# Patient Record
Sex: FEMALE | Race: WHITE | NOT HISPANIC OR LATINO
[De-identification: names, ages, dates, MRNs, and addresses within clinical notes are randomized per-mention and may not be internally consistent; named-entity substitution may affect disease eponyms.]

---

## 2017-02-06 ENCOUNTER — APPOINTMENT (OUTPATIENT)
Dept: ORTHOPEDIC SURGERY | Facility: CLINIC | Age: 72
End: 2017-02-06

## 2017-02-06 DIAGNOSIS — T84.82XD FIBROSIS DUE TO INTERNAL ORTHOPEDIC PROSTHETIC DEVICES, IMPLANTS AND GRAFTS, SUBSEQUENT ENCOUNTER: ICD-10-CM

## 2017-02-06 DIAGNOSIS — M17.11 UNILATERAL PRIMARY OSTEOARTHRITIS, RIGHT KNEE: ICD-10-CM

## 2017-02-06 DIAGNOSIS — Z47.1 AFTERCARE FOLLOWING JOINT REPLACEMENT SURGERY: ICD-10-CM

## 2017-02-06 DIAGNOSIS — Z96.652 AFTERCARE FOLLOWING JOINT REPLACEMENT SURGERY: ICD-10-CM

## 2017-05-02 ENCOUNTER — OUTPATIENT (OUTPATIENT)
Dept: OUTPATIENT SERVICES | Facility: HOSPITAL | Age: 72
LOS: 1 days | End: 2017-05-02
Payer: MEDICARE

## 2017-05-02 DIAGNOSIS — Z98.89 OTHER SPECIFIED POSTPROCEDURAL STATES: Chronic | ICD-10-CM

## 2017-05-02 DIAGNOSIS — Z41.1 ENCOUNTER FOR COSMETIC SURGERY: Chronic | ICD-10-CM

## 2017-05-02 PROCEDURE — 73630 X-RAY EXAM OF FOOT: CPT

## 2017-05-02 PROCEDURE — 73630 X-RAY EXAM OF FOOT: CPT | Mod: 26,LT

## 2017-06-05 ENCOUNTER — EMERGENCY (EMERGENCY)
Facility: HOSPITAL | Age: 72
LOS: 1 days | Discharge: HOME | End: 2017-06-05
Attending: EMERGENCY MEDICINE | Admitting: EMERGENCY MEDICINE
Payer: MEDICARE

## 2017-06-05 VITALS
TEMPERATURE: 98 F | DIASTOLIC BLOOD PRESSURE: 67 MMHG | OXYGEN SATURATION: 99 % | HEART RATE: 82 BPM | RESPIRATION RATE: 16 BRPM | SYSTOLIC BLOOD PRESSURE: 122 MMHG

## 2017-06-05 DIAGNOSIS — Z41.1 ENCOUNTER FOR COSMETIC SURGERY: Chronic | ICD-10-CM

## 2017-06-05 DIAGNOSIS — Z79.899 OTHER LONG TERM (CURRENT) DRUG THERAPY: ICD-10-CM

## 2017-06-05 DIAGNOSIS — M25.572 PAIN IN LEFT ANKLE AND JOINTS OF LEFT FOOT: ICD-10-CM

## 2017-06-05 DIAGNOSIS — Z98.89 OTHER SPECIFIED POSTPROCEDURAL STATES: Chronic | ICD-10-CM

## 2017-06-05 DIAGNOSIS — S93.402A SPRAIN OF UNSPECIFIED LIGAMENT OF LEFT ANKLE, INITIAL ENCOUNTER: ICD-10-CM
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
## 2017-06-05 DIAGNOSIS — Z79.891 LONG TERM (CURRENT) USE OF OPIATE ANALGESIC: ICD-10-CM
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
## 2017-06-05 PROCEDURE — 99283 EMERGENCY DEPT VISIT LOW MDM: CPT

## 2017-06-05 PROCEDURE — 99283 EMERGENCY DEPT VISIT LOW MDM: CPT | Mod: 25

## 2017-06-05 PROCEDURE — 73610 X-RAY EXAM OF ANKLE: CPT | Mod: 26,LT

## 2017-06-05 PROCEDURE — 73610 X-RAY EXAM OF ANKLE: CPT

## 2017-06-05 NOTE — ED PROVIDER NOTE - MEDICAL DECISION MAKING DETAILS
twisted left ankle today.  only pain with weight bearing.  able to weight bear.  will get xray twisted left ankle today.  only pain with weight bearing.  able to weight bear.  will get xray.  xray no fracture

## 2017-06-16 ENCOUNTER — OUTPATIENT (OUTPATIENT)
Dept: OUTPATIENT SERVICES | Facility: HOSPITAL | Age: 72
LOS: 1 days | Discharge: HOME | End: 2017-06-16

## 2017-06-16 DIAGNOSIS — Z98.89 OTHER SPECIFIED POSTPROCEDURAL STATES: Chronic | ICD-10-CM

## 2017-06-16 DIAGNOSIS — E66.8 OTHER OBESITY: ICD-10-CM

## 2017-06-16 DIAGNOSIS — K21.9 GASTRO-ESOPHAGEAL REFLUX DISEASE WITHOUT ESOPHAGITIS: ICD-10-CM

## 2017-06-16 DIAGNOSIS — Z41.1 ENCOUNTER FOR COSMETIC SURGERY: Chronic | ICD-10-CM

## 2017-06-16 DIAGNOSIS — M24.575 CONTRACTURE, LEFT FOOT: ICD-10-CM

## 2017-06-16 DIAGNOSIS — E11.9 TYPE 2 DIABETES MELLITUS WITHOUT COMPLICATIONS: ICD-10-CM

## 2017-06-16 DIAGNOSIS — M20.12 HALLUX VALGUS (ACQUIRED), LEFT FOOT: ICD-10-CM

## 2017-06-16 DIAGNOSIS — Z47.2 ENCOUNTER FOR REMOVAL OF INTERNAL FIXATION DEVICE: ICD-10-CM

## 2017-07-17 ENCOUNTER — OUTPATIENT (OUTPATIENT)
Dept: OUTPATIENT SERVICES | Facility: HOSPITAL | Age: 72
LOS: 1 days | End: 2017-07-17
Payer: MEDICARE

## 2017-07-17 DIAGNOSIS — Z98.89 OTHER SPECIFIED POSTPROCEDURAL STATES: Chronic | ICD-10-CM

## 2017-07-17 DIAGNOSIS — Z41.1 ENCOUNTER FOR COSMETIC SURGERY: Chronic | ICD-10-CM

## 2017-07-17 PROCEDURE — 73630 X-RAY EXAM OF FOOT: CPT | Mod: 26,LT

## 2017-07-17 PROCEDURE — 73630 X-RAY EXAM OF FOOT: CPT

## 2017-08-02 ENCOUNTER — OUTPATIENT (OUTPATIENT)
Dept: OUTPATIENT SERVICES | Facility: HOSPITAL | Age: 72
LOS: 1 days | End: 2017-08-02
Payer: MEDICARE

## 2017-08-02 DIAGNOSIS — Z98.89 OTHER SPECIFIED POSTPROCEDURAL STATES: Chronic | ICD-10-CM

## 2017-08-02 DIAGNOSIS — Z41.1 ENCOUNTER FOR COSMETIC SURGERY: Chronic | ICD-10-CM

## 2017-08-02 PROCEDURE — 73630 X-RAY EXAM OF FOOT: CPT | Mod: 26,LT

## 2017-08-02 PROCEDURE — 73630 X-RAY EXAM OF FOOT: CPT

## 2017-08-21 ENCOUNTER — OUTPATIENT (OUTPATIENT)
Dept: OUTPATIENT SERVICES | Facility: HOSPITAL | Age: 72
LOS: 1 days | End: 2017-08-21
Payer: MEDICARE

## 2017-08-21 DIAGNOSIS — Z98.89 OTHER SPECIFIED POSTPROCEDURAL STATES: Chronic | ICD-10-CM

## 2017-08-21 DIAGNOSIS — Z41.1 ENCOUNTER FOR COSMETIC SURGERY: Chronic | ICD-10-CM

## 2017-08-21 PROCEDURE — 73630 X-RAY EXAM OF FOOT: CPT

## 2017-08-21 PROCEDURE — 73630 X-RAY EXAM OF FOOT: CPT | Mod: 26,LT

## 2017-10-19 ENCOUNTER — APPOINTMENT (OUTPATIENT)
Dept: HEART AND VASCULAR | Facility: CLINIC | Age: 72
End: 2017-10-19
Payer: MEDICARE

## 2017-10-19 VITALS
OXYGEN SATURATION: 96 % | BODY MASS INDEX: 31.22 KG/M2 | HEIGHT: 60 IN | WEIGHT: 159 LBS | SYSTOLIC BLOOD PRESSURE: 108 MMHG | HEART RATE: 81 BPM | DIASTOLIC BLOOD PRESSURE: 72 MMHG | TEMPERATURE: 98.2 F

## 2017-10-19 PROCEDURE — 99214 OFFICE O/P EST MOD 30 MIN: CPT | Mod: 25

## 2017-11-02 ENCOUNTER — APPOINTMENT (OUTPATIENT)
Dept: HEART AND VASCULAR | Facility: CLINIC | Age: 72
End: 2017-11-02

## 2017-11-04 ENCOUNTER — OUTPATIENT (OUTPATIENT)
Dept: OUTPATIENT SERVICES | Facility: HOSPITAL | Age: 72
LOS: 1 days | End: 2017-11-04
Payer: MEDICARE

## 2017-11-04 DIAGNOSIS — Z98.89 OTHER SPECIFIED POSTPROCEDURAL STATES: Chronic | ICD-10-CM

## 2017-11-04 DIAGNOSIS — Z41.1 ENCOUNTER FOR COSMETIC SURGERY: Chronic | ICD-10-CM

## 2017-11-04 PROCEDURE — 72148 MRI LUMBAR SPINE W/O DYE: CPT

## 2017-11-04 PROCEDURE — 72148 MRI LUMBAR SPINE W/O DYE: CPT | Mod: 26

## 2018-04-16 ENCOUNTER — OUTPATIENT (OUTPATIENT)
Dept: OUTPATIENT SERVICES | Facility: HOSPITAL | Age: 73
LOS: 1 days | End: 2018-04-16
Payer: MEDICARE

## 2018-04-16 DIAGNOSIS — Z98.89 OTHER SPECIFIED POSTPROCEDURAL STATES: Chronic | ICD-10-CM

## 2018-04-16 DIAGNOSIS — Z41.1 ENCOUNTER FOR COSMETIC SURGERY: Chronic | ICD-10-CM

## 2018-04-16 PROCEDURE — 73630 X-RAY EXAM OF FOOT: CPT

## 2018-04-16 PROCEDURE — 73630 X-RAY EXAM OF FOOT: CPT | Mod: 26,LT

## 2018-04-23 ENCOUNTER — APPOINTMENT (OUTPATIENT)
Dept: CT IMAGING | Facility: HOSPITAL | Age: 73
End: 2018-04-23
Payer: MEDICARE

## 2018-04-23 ENCOUNTER — OUTPATIENT (OUTPATIENT)
Dept: OUTPATIENT SERVICES | Facility: HOSPITAL | Age: 73
LOS: 1 days | End: 2018-04-23
Payer: MEDICARE

## 2018-04-23 DIAGNOSIS — Z98.89 OTHER SPECIFIED POSTPROCEDURAL STATES: Chronic | ICD-10-CM

## 2018-04-23 DIAGNOSIS — Z41.1 ENCOUNTER FOR COSMETIC SURGERY: Chronic | ICD-10-CM

## 2018-04-23 PROCEDURE — 73700 CT LOWER EXTREMITY W/O DYE: CPT

## 2018-04-23 PROCEDURE — 73700 CT LOWER EXTREMITY W/O DYE: CPT | Mod: 26,LT

## 2018-11-16 ENCOUNTER — OUTPATIENT (OUTPATIENT)
Dept: OUTPATIENT SERVICES | Facility: HOSPITAL | Age: 73
LOS: 1 days | Discharge: HOME | End: 2018-11-16

## 2018-11-16 DIAGNOSIS — Z98.89 OTHER SPECIFIED POSTPROCEDURAL STATES: Chronic | ICD-10-CM

## 2018-11-16 DIAGNOSIS — Z41.1 ENCOUNTER FOR COSMETIC SURGERY: Chronic | ICD-10-CM

## 2019-01-15 ENCOUNTER — OUTPATIENT (OUTPATIENT)
Dept: OUTPATIENT SERVICES | Facility: HOSPITAL | Age: 74
LOS: 1 days | End: 2019-01-15
Payer: MEDICARE

## 2019-01-15 DIAGNOSIS — Z41.1 ENCOUNTER FOR COSMETIC SURGERY: Chronic | ICD-10-CM

## 2019-01-15 DIAGNOSIS — Z98.89 OTHER SPECIFIED POSTPROCEDURAL STATES: Chronic | ICD-10-CM

## 2019-01-15 PROCEDURE — 73630 X-RAY EXAM OF FOOT: CPT

## 2019-01-15 PROCEDURE — 73630 X-RAY EXAM OF FOOT: CPT | Mod: 26,LT

## 2019-02-26 ENCOUNTER — OUTPATIENT (OUTPATIENT)
Dept: OUTPATIENT SERVICES | Facility: HOSPITAL | Age: 74
LOS: 1 days | End: 2019-02-26
Payer: MEDICARE

## 2019-02-26 DIAGNOSIS — Z98.89 OTHER SPECIFIED POSTPROCEDURAL STATES: Chronic | ICD-10-CM

## 2019-02-26 DIAGNOSIS — Z41.1 ENCOUNTER FOR COSMETIC SURGERY: Chronic | ICD-10-CM

## 2019-02-26 PROCEDURE — 73630 X-RAY EXAM OF FOOT: CPT | Mod: 26,LT

## 2019-02-26 PROCEDURE — 73630 X-RAY EXAM OF FOOT: CPT

## 2019-03-27 ENCOUNTER — APPOINTMENT (OUTPATIENT)
Dept: VASCULAR SURGERY | Facility: CLINIC | Age: 74
End: 2019-03-27
Payer: MEDICARE

## 2019-03-27 VITALS — SYSTOLIC BLOOD PRESSURE: 144 MMHG | DIASTOLIC BLOOD PRESSURE: 79 MMHG | OXYGEN SATURATION: 88 % | HEART RATE: 91 BPM

## 2019-03-27 PROCEDURE — 99214 OFFICE O/P EST MOD 30 MIN: CPT

## 2019-03-31 NOTE — HISTORY OF PRESENT ILLNESS
[FreeTextEntry1] : 74 yo F who presents to the office for follow-up of spider veins. Patient was last seen here in 2016 for this. She reports that over the last two years her edema and spider veins have worsened. States her legs are very heavy and painful and the veins become large and tender to touch. Patient has underwent multiple foot and knee surgeries, but is walking without a cane. 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 No known personal or family hx of DVT or heme disorder. \par

## 2019-03-31 NOTE — ASSESSMENT
[FreeTextEntry1] : 74 yo F here for follow-up evaluation of spider veins. Pt states her legs are painful, but is mostly bothered by their appearance.  Previous treatments with Dr Newman.  Discussion with pt about her surgical options.  Veins are too small in order to perform stab phlebectomy.  Despite having symptoms, explained that sclerotherapy is considered cosmetic and not covered by insurance.  Will need several sessions if she decides to proceed. Will call the office and schedule if desired.

## 2019-03-31 NOTE — END OF VISIT
[FreeTextEntry3] : All medical record entries made by the Scribe were at my, Dr. Napier's direction and personally dictated by me on 03/27/2019 I have reviewed the chart and agree that the record accurately reflects my personal performance of the history, physical exam, assessment and plan. I have also personally directed, reviewed, and agreed with the chart.

## 2019-03-31 NOTE — PHYSICAL EXAM
[Respiratory Effort] : normal respiratory effort [Normal Heart Sounds] : normal heart sounds [2+] : left 2+ [Ankle Swelling (On Exam)] : present [Ankle Swelling Bilaterally] : bilaterally  [No Rash or Lesion] : No rash or lesion [Alert] : alert [Calm] : calm [Ankle Swelling On The Left] : moderate [JVD] : no jugular venous distention  [Varicose Veins Of Lower Extremities] : not present [de-identified] : Well appearing, NAD [de-identified] : NCAT [FreeTextEntry1] : many large, dark areas of spider veins throughout both legs [de-identified] : FROM

## 2019-06-10 ENCOUNTER — OUTPATIENT (OUTPATIENT)
Dept: OUTPATIENT SERVICES | Facility: HOSPITAL | Age: 74
LOS: 1 days | End: 2019-06-10
Payer: MEDICARE

## 2019-06-10 DIAGNOSIS — Z98.89 OTHER SPECIFIED POSTPROCEDURAL STATES: Chronic | ICD-10-CM

## 2019-06-10 DIAGNOSIS — Z41.1 ENCOUNTER FOR COSMETIC SURGERY: Chronic | ICD-10-CM

## 2019-06-10 PROCEDURE — 73630 X-RAY EXAM OF FOOT: CPT | Mod: 26,50

## 2019-06-12 PROCEDURE — 97161 PT EVAL LOW COMPLEX 20 MIN: CPT

## 2019-06-12 PROCEDURE — 73630 X-RAY EXAM OF FOOT: CPT

## 2019-09-10 ENCOUNTER — OUTPATIENT (OUTPATIENT)
Dept: OUTPATIENT SERVICES | Facility: HOSPITAL | Age: 74
LOS: 1 days | End: 2019-09-10
Payer: MEDICARE

## 2019-09-10 DIAGNOSIS — Z98.89 OTHER SPECIFIED POSTPROCEDURAL STATES: Chronic | ICD-10-CM

## 2019-09-10 DIAGNOSIS — Z41.1 ENCOUNTER FOR COSMETIC SURGERY: Chronic | ICD-10-CM

## 2019-09-10 PROCEDURE — 73630 X-RAY EXAM OF FOOT: CPT | Mod: 26,LT

## 2019-09-10 PROCEDURE — 73630 X-RAY EXAM OF FOOT: CPT

## 2019-12-03 ENCOUNTER — RESULT REVIEW (OUTPATIENT)
Age: 74
End: 2019-12-03

## 2020-09-05 ENCOUNTER — APPOINTMENT (OUTPATIENT)
Dept: MRI IMAGING | Facility: HOSPITAL | Age: 75
End: 2020-09-05

## 2020-09-05 ENCOUNTER — OUTPATIENT (OUTPATIENT)
Dept: OUTPATIENT SERVICES | Facility: HOSPITAL | Age: 75
LOS: 1 days | End: 2020-09-05
Payer: MEDICARE

## 2020-09-05 DIAGNOSIS — Z41.1 ENCOUNTER FOR COSMETIC SURGERY: Chronic | ICD-10-CM

## 2020-09-05 DIAGNOSIS — Z98.89 OTHER SPECIFIED POSTPROCEDURAL STATES: Chronic | ICD-10-CM

## 2020-09-05 PROCEDURE — 72148 MRI LUMBAR SPINE W/O DYE: CPT | Mod: 26

## 2020-09-05 PROCEDURE — 72148 MRI LUMBAR SPINE W/O DYE: CPT

## 2020-12-07 ENCOUNTER — OUTPATIENT (OUTPATIENT)
Dept: OUTPATIENT SERVICES | Facility: HOSPITAL | Age: 75
LOS: 1 days | End: 2020-12-07
Payer: MEDICARE

## 2020-12-07 DIAGNOSIS — Z41.1 ENCOUNTER FOR COSMETIC SURGERY: Chronic | ICD-10-CM

## 2020-12-07 DIAGNOSIS — Z98.89 OTHER SPECIFIED POSTPROCEDURAL STATES: Chronic | ICD-10-CM

## 2020-12-07 PROCEDURE — 73502 X-RAY EXAM HIP UNI 2-3 VIEWS: CPT | Mod: 26,LT

## 2020-12-07 PROCEDURE — 73564 X-RAY EXAM KNEE 4 OR MORE: CPT

## 2020-12-07 PROCEDURE — 73030 X-RAY EXAM OF SHOULDER: CPT

## 2020-12-07 PROCEDURE — 73030 X-RAY EXAM OF SHOULDER: CPT | Mod: 26,LT

## 2020-12-07 PROCEDURE — 73564 X-RAY EXAM KNEE 4 OR MORE: CPT | Mod: 26,RT

## 2020-12-07 PROCEDURE — 73502 X-RAY EXAM HIP UNI 2-3 VIEWS: CPT

## 2021-04-14 ENCOUNTER — EMERGENCY (EMERGENCY)
Facility: HOSPITAL | Age: 76
LOS: 1 days | Discharge: HOME | End: 2021-04-14
Admitting: EMERGENCY MEDICINE
Payer: MEDICARE

## 2021-04-14 ENCOUNTER — APPOINTMENT (OUTPATIENT)
Dept: VASCULAR SURGERY | Facility: CLINIC | Age: 76
End: 2021-04-14
Payer: MEDICARE

## 2021-04-14 VITALS
RESPIRATION RATE: 20 BRPM | HEART RATE: 67 BPM | WEIGHT: 171.96 LBS | SYSTOLIC BLOOD PRESSURE: 137 MMHG | OXYGEN SATURATION: 98 % | TEMPERATURE: 98 F | DIASTOLIC BLOOD PRESSURE: 77 MMHG | HEIGHT: 62 IN

## 2021-04-14 DIAGNOSIS — Z98.89 OTHER SPECIFIED POSTPROCEDURAL STATES: Chronic | ICD-10-CM
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
## 2021-04-14 DIAGNOSIS — S50.811A ABRASION OF RIGHT FOREARM, INITIAL ENCOUNTER: ICD-10-CM

## 2021-04-14 DIAGNOSIS — S51.011A LACERATION WITHOUT FOREIGN BODY OF RIGHT ELBOW, INITIAL ENCOUNTER: ICD-10-CM

## 2021-04-14 DIAGNOSIS — Z87.891 PERSONAL HISTORY OF NICOTINE DEPENDENCE: ICD-10-CM

## 2021-04-14 DIAGNOSIS — I83.93 ASYMPTOMATIC VARICOSE VEINS OF BILATERAL LOWER EXTREMITIES: ICD-10-CM

## 2021-04-14 DIAGNOSIS — Z41.1 ENCOUNTER FOR COSMETIC SURGERY: Chronic | ICD-10-CM

## 2021-04-14 PROCEDURE — 12002 RPR S/N/AX/GEN/TRNK2.6-7.5CM: CPT

## 2021-04-14 PROCEDURE — 99282 EMERGENCY DEPT VISIT SF MDM: CPT | Mod: 25

## 2021-04-14 PROCEDURE — 99213 OFFICE O/P EST LOW 20 MIN: CPT

## 2021-04-14 PROCEDURE — 93970 EXTREMITY STUDY: CPT

## 2021-04-14 PROCEDURE — 99283 EMERGENCY DEPT VISIT LOW MDM: CPT | Mod: 25

## 2021-04-14 NOTE — ED PROVIDER NOTE - OBJECTIVE STATEMENT
74 y/o female with updated tdap is here in the ED c/o scratch located on the right elbow. Pt states she was cleaning her mirror when she the edge scratched her arm. She applied a band aid which controlled the bleeding. She denies the following: numbness/tingling, arm pain.

## 2021-04-14 NOTE — ED PROVIDER NOTE - PATIENT PORTAL LINK FT
You can access the FollowMyHealth Patient Portal offered by [facility] by registering at the following website: [email or web address]. By joining [employer]’s FollowMyHealth portal, you will also be able to view your health information using other applications (apps) compatible with our system.

## 2021-04-14 NOTE — ED ADULT NURSE NOTE - OBJECTIVE STATEMENT
Pt is a 74 y/o female A&Ox4 in NAD ambulatory with steady gait c/o laceration to right elbow. Bleeding controlled with dressing. Pt denies fall.

## 2021-04-14 NOTE — ED PROVIDER NOTE - CLINICAL SUMMARY MEDICAL DECISION MAKING FREE TEXT BOX
76 y/o female here in the ED with very superficial lac/abrasion located on the right proximal forearm with hemostasis achieved prior to ED arrival. Wound care and dermabond placed. Discussed wound care and follow-up with PMD I have discussed the discharge plan with the patient. The patient agrees with the plan, as discussed.  The patient understands Emergency Department diagnosis is a preliminary diagnosis often based on limited information and that the patient must adhere to the follow-up plan as discussed.  The patient understands that if the symptoms worsen or if prescribed medications do not have the desired/planned effect that the patient may return to the Emergency Department at any time for further evaluation and treatment.

## 2021-05-07 NOTE — ADDENDUM
[FreeTextEntry1] : This note was written by Nia Forrest on 04/14/2021 acting as scribe for Quyen Pisano M.D.\par \par I, Dr. Holger Napier  have read and attest that all the information, medical decision making and discharge instructions within are true and accurate.

## 2021-05-07 NOTE — ASSESSMENT
[FreeTextEntry1] : 76 y/o F here for follow-up evaluation of BLE pain and swelling. On exam, many large, dark areas of spider veins throughout both legs. Feet are pink, toes are warm to touch with adequate capillary refill. \par \par Plan: \par Discussed findings and treatment options. Small varicosities not amendable for intervention. Sclerotherapy discussed are cosmetic and therefore insurance does not cover this. No evidence of DVT, skin intact well perfused. Weight loss measures provided as this will help reduce swelling. Pt recommended she wear compression stockings daily, raise legs daily as much as possible and stay active with daily walking. She may f/u here as necessary.

## 2021-05-07 NOTE — PHYSICAL EXAM
[Respiratory Effort] : normal respiratory effort [Normal Heart Sounds] : normal heart sounds [2+] : left 2+ [Ankle Swelling (On Exam)] : present [Ankle Swelling Bilaterally] : bilaterally  [Ankle Swelling On The Left] : moderate [No Rash or Lesion] : No rash or lesion [Alert] : alert [Oriented to Person] : oriented to person [Oriented to Place] : oriented to place [Oriented to Time] : oriented to time [Calm] : calm [JVD] : no jugular venous distention  [Varicose Veins Of Lower Extremities] : not present [de-identified] : Well appearing, NAD [de-identified] : NCAT [de-identified] : Supple  [FreeTextEntry1] : many large, dark areas of spider veins throughout both legs [de-identified] : FROM

## 2021-05-07 NOTE — HISTORY OF PRESENT ILLNESS
[FreeTextEntry1] : 76 y/o F who presents to the office for follow-up of spider veins. Patient was last seen here in 2019 for this. She reports that over the last two years her edema and spider veins have worsened. States her legs are very heavy and painful and the veins have become larger and tender to touch. Patient has underwent multiple foot and knee surgeries, but is walking without a cane. Furthermore, pt reports 2 weeks ago, she developed severe LE swelling which has now improved. Pt reports during the past year she has gained ~ 40lbs. Denies any leg trauma or ulcerations. 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 No known personal or family hx of DVT or heme disorder. \par

## 2021-05-11 ENCOUNTER — APPOINTMENT (OUTPATIENT)
Dept: HEART AND VASCULAR | Facility: CLINIC | Age: 76
End: 2021-05-11
Payer: MEDICARE

## 2021-05-11 ENCOUNTER — NON-APPOINTMENT (OUTPATIENT)
Age: 76
End: 2021-05-11

## 2021-05-11 VITALS
OXYGEN SATURATION: 94 % | BODY MASS INDEX: 33.38 KG/M2 | HEIGHT: 60 IN | HEART RATE: 86 BPM | SYSTOLIC BLOOD PRESSURE: 130 MMHG | WEIGHT: 170 LBS | TEMPERATURE: 97.7 F | DIASTOLIC BLOOD PRESSURE: 96 MMHG

## 2021-05-11 DIAGNOSIS — I83.90 ASYMPTOMATIC VARICOSE VEINS OF UNSPECIFIED LOWER EXTREMITY: ICD-10-CM

## 2021-05-11 PROCEDURE — 93000 ELECTROCARDIOGRAM COMPLETE: CPT

## 2021-05-11 PROCEDURE — 99204 OFFICE O/P NEW MOD 45 MIN: CPT

## 2021-05-11 RX ORDER — ATORVASTATIN CALCIUM 10 MG/1
10 TABLET, FILM COATED ORAL
Refills: 0 | Status: ACTIVE | COMMUNITY

## 2021-05-27 ENCOUNTER — APPOINTMENT (OUTPATIENT)
Dept: HEART AND VASCULAR | Facility: CLINIC | Age: 76
End: 2021-05-27
Payer: MEDICARE

## 2021-05-27 PROCEDURE — 93306 TTE W/DOPPLER COMPLETE: CPT

## 2021-07-23 NOTE — ASSESSMENT
[FreeTextEntry1] : LE Edema-  Assuming renal, hepatic and thyroid fx is normal, pt will need an echo, scheduled.  No Murmur on exam. \par \par HLD- continue statin.

## 2021-07-23 NOTE — PHYSICAL EXAM
[Obese] : obese [No Cyanosis] : no cyanosis [No Clubbing] : no clubbing [Normal DP B/L] : normal DP B/L [Edema ___] : edema [unfilled] [Venous varicosities] : venous varicosities [Normal] : alert and oriented, normal memory [Normal S1, S2] : normal S1, S2 [de-identified] : clouded cornea on right, lense on left [de-identified] : No JVD or bruits  [de-identified] : 1/6 SPENCER [de-identified] : loves to talk

## 2021-07-23 NOTE — HISTORY OF PRESENT ILLNESS
[FreeTextEntry1] : Ortho- Dr Boswell\par Ortho- Dr Garcia\par Vasc- Dr Napier\par PCP- Dr SENDY Mckeon  [phone]

## 2021-07-23 NOTE — REVIEW OF SYSTEMS
[Lower Ext Edema] : lower extremity edema [Tingling (Paresthesia)] : tingling [Negative] : Heme/Lymph [Joint Pain] : joint pain [de-identified] : right wrist

## 2021-07-23 NOTE — REASON FOR VISIT
[FreeTextEntry1] : 76 y/o F with recent LE edema.  Dr Weiss recommended she get an echo.  Has previously seen Dr Napier for her varicose veins. She does not elevated or use compression stockings.\par \par \par \par EKG: NSR, normal axis and intervals, no ST-Tw abnormalities. 5/11/21

## 2021-09-01 ENCOUNTER — APPOINTMENT (OUTPATIENT)
Dept: HEART AND VASCULAR | Facility: CLINIC | Age: 76
End: 2021-09-01
Payer: MEDICARE

## 2021-09-01 VITALS
DIASTOLIC BLOOD PRESSURE: 93 MMHG | WEIGHT: 168.98 LBS | SYSTOLIC BLOOD PRESSURE: 130 MMHG | TEMPERATURE: 97.5 F | HEIGHT: 60 IN | HEART RATE: 92 BPM | OXYGEN SATURATION: 97 % | BODY MASS INDEX: 33.18 KG/M2

## 2021-09-01 DIAGNOSIS — R60.0 LOCALIZED EDEMA: ICD-10-CM

## 2021-09-01 DIAGNOSIS — R01.1 CARDIAC MURMUR, UNSPECIFIED: ICD-10-CM

## 2021-09-01 PROCEDURE — 99213 OFFICE O/P EST LOW 20 MIN: CPT

## 2021-09-01 NOTE — ASSESSMENT
[FreeTextEntry1] : LE Edema-  Assuming renal, hepatic and thyroid fx is normal, pt will need an echo, scheduled.  No Murmur on exam..  Only mild TR and MR.\par \par HLD- continue statin.

## 2021-09-01 NOTE — REVIEW OF SYSTEMS
[Lower Ext Edema] : lower extremity edema [Joint Pain] : joint pain [Tingling (Paresthesia)] : tingling [Negative] : Heme/Lymph [FreeTextEntry5] : varicose veins, some are superficial [de-identified] : right wrist [de-identified] : Loquacious

## 2021-09-01 NOTE — HISTORY OF PRESENT ILLNESS
[FreeTextEntry1] : Ortho- Dr Boswell\par Ortho- Dr Garcia\par Vasc- Dr aNpier\par PCP- Dr SENDY Mckeon  [phone]\par Ophtho- Dr Garcia\par GYN- Dr Armenta, Dr Dixon

## 2021-09-14 ENCOUNTER — OUTPATIENT (OUTPATIENT)
Dept: OUTPATIENT SERVICES | Facility: HOSPITAL | Age: 76
LOS: 1 days | End: 2021-09-14
Payer: MEDICARE

## 2021-09-14 DIAGNOSIS — Z98.89 OTHER SPECIFIED POSTPROCEDURAL STATES: Chronic | ICD-10-CM

## 2021-09-14 DIAGNOSIS — Z41.1 ENCOUNTER FOR COSMETIC SURGERY: Chronic | ICD-10-CM

## 2021-09-14 PROCEDURE — 73630 X-RAY EXAM OF FOOT: CPT | Mod: 26,LT

## 2021-09-14 PROCEDURE — 73630 X-RAY EXAM OF FOOT: CPT

## 2021-11-18 ENCOUNTER — TRANSCRIPTION ENCOUNTER (OUTPATIENT)
Age: 76
End: 2021-11-18

## 2021-11-18 VITALS
TEMPERATURE: 97 F | OXYGEN SATURATION: 96 % | HEIGHT: 62 IN | RESPIRATION RATE: 87 BRPM | WEIGHT: 177.03 LBS | DIASTOLIC BLOOD PRESSURE: 78 MMHG | SYSTOLIC BLOOD PRESSURE: 131 MMHG

## 2021-11-19 ENCOUNTER — INPATIENT (INPATIENT)
Facility: HOSPITAL | Age: 76
LOS: 8 days | Discharge: HOME HEALTH | DRG: 497 | End: 2021-11-28
Attending: ORTHOPAEDIC SURGERY | Admitting: ORTHOPAEDIC SURGERY
Payer: MEDICARE

## 2021-11-19 DIAGNOSIS — Z98.89 OTHER SPECIFIED POSTPROCEDURAL STATES: Chronic | ICD-10-CM

## 2021-11-19 DIAGNOSIS — R01.1 CARDIAC MURMUR, UNSPECIFIED: ICD-10-CM

## 2021-11-19 DIAGNOSIS — Z98.890 OTHER SPECIFIED POSTPROCEDURAL STATES: Chronic | ICD-10-CM

## 2021-11-19 DIAGNOSIS — E78.5 HYPERLIPIDEMIA, UNSPECIFIED: ICD-10-CM

## 2021-11-19 DIAGNOSIS — Z41.1 ENCOUNTER FOR COSMETIC SURGERY: Chronic | ICD-10-CM

## 2021-11-19 DIAGNOSIS — M20.12 HALLUX VALGUS (ACQUIRED), LEFT FOOT: ICD-10-CM

## 2021-11-19 DIAGNOSIS — I77.89 OTHER SPECIFIED DISORDERS OF ARTERIES AND ARTERIOLES: ICD-10-CM

## 2021-11-19 DIAGNOSIS — Z96.659 PRESENCE OF UNSPECIFIED ARTIFICIAL KNEE JOINT: Chronic | ICD-10-CM

## 2021-11-19 RX ORDER — METOCLOPRAMIDE HCL 10 MG
10 TABLET ORAL EVERY 6 HOURS
Refills: 0 | Status: DISCONTINUED | OUTPATIENT
Start: 2021-11-19 | End: 2021-11-28

## 2021-11-19 RX ORDER — OXYCODONE HYDROCHLORIDE 5 MG/1
10 TABLET ORAL EVERY 4 HOURS
Refills: 0 | Status: DISCONTINUED | OUTPATIENT
Start: 2021-11-19 | End: 2021-11-21

## 2021-11-19 RX ORDER — BENZOCAINE AND MENTHOL 5; 1 G/100ML; G/100ML
1 LIQUID ORAL ONCE
Refills: 0 | Status: COMPLETED | OUTPATIENT
Start: 2021-11-19 | End: 2021-11-19

## 2021-11-19 RX ORDER — SPIRONOLACTONE 25 MG/1
25 TABLET, FILM COATED ORAL
Refills: 0 | Status: DISCONTINUED | OUTPATIENT
Start: 2021-11-19 | End: 2021-11-25

## 2021-11-19 RX ORDER — HYDROMORPHONE HYDROCHLORIDE 2 MG/ML
0.5 INJECTION INTRAMUSCULAR; INTRAVENOUS; SUBCUTANEOUS EVERY 4 HOURS
Refills: 0 | Status: DISCONTINUED | OUTPATIENT
Start: 2021-11-19 | End: 2021-11-19

## 2021-11-19 RX ORDER — ATORVASTATIN CALCIUM 80 MG/1
10 TABLET, FILM COATED ORAL AT BEDTIME
Refills: 0 | Status: DISCONTINUED | OUTPATIENT
Start: 2021-11-19 | End: 2021-11-28

## 2021-11-19 RX ORDER — CEFAZOLIN SODIUM 1 G
2000 VIAL (EA) INJECTION EVERY 8 HOURS
Refills: 0 | Status: COMPLETED | OUTPATIENT
Start: 2021-11-19 | End: 2021-11-19

## 2021-11-19 RX ORDER — ASPIRIN/CALCIUM CARB/MAGNESIUM 324 MG
81 TABLET ORAL DAILY
Refills: 0 | Status: DISCONTINUED | OUTPATIENT
Start: 2021-11-19 | End: 2021-11-28

## 2021-11-19 RX ORDER — OXYCODONE HYDROCHLORIDE 5 MG/1
10 TABLET ORAL EVERY 4 HOURS
Refills: 0 | Status: DISCONTINUED | OUTPATIENT
Start: 2021-11-19 | End: 2021-11-19

## 2021-11-19 RX ORDER — VALACYCLOVIR 500 MG/1
1 TABLET, FILM COATED ORAL
Qty: 0 | Refills: 0 | DISCHARGE

## 2021-11-19 RX ORDER — BENZOCAINE AND MENTHOL 5; 1 G/100ML; G/100ML
1 LIQUID ORAL DAILY
Refills: 0 | Status: DISCONTINUED | OUTPATIENT
Start: 2021-11-19 | End: 2021-11-19

## 2021-11-19 RX ORDER — OXYCODONE AND ACETAMINOPHEN 5; 325 MG/1; MG/1
2 TABLET ORAL EVERY 4 HOURS
Refills: 0 | Status: DISCONTINUED | OUTPATIENT
Start: 2021-11-19 | End: 2021-11-19

## 2021-11-19 RX ORDER — HYDROMORPHONE HYDROCHLORIDE 2 MG/ML
0.5 INJECTION INTRAMUSCULAR; INTRAVENOUS; SUBCUTANEOUS
Refills: 0 | Status: DISCONTINUED | OUTPATIENT
Start: 2021-11-19 | End: 2021-11-19

## 2021-11-19 RX ORDER — ACETAMINOPHEN 500 MG
650 TABLET ORAL EVERY 6 HOURS
Refills: 0 | Status: DISCONTINUED | OUTPATIENT
Start: 2021-11-19 | End: 2021-11-28

## 2021-11-19 RX ORDER — SODIUM CHLORIDE 9 MG/ML
1000 INJECTION, SOLUTION INTRAVENOUS
Refills: 0 | Status: DISCONTINUED | OUTPATIENT
Start: 2021-11-19 | End: 2021-11-28

## 2021-11-19 RX ADMIN — BENZOCAINE AND MENTHOL 1 LOZENGE: 5; 1 LIQUID ORAL at 17:08

## 2021-11-19 RX ADMIN — Medication 100 MILLIGRAM(S): at 23:09

## 2021-11-19 RX ADMIN — ATORVASTATIN CALCIUM 10 MILLIGRAM(S): 80 TABLET, FILM COATED ORAL at 23:06

## 2021-11-19 RX ADMIN — Medication 100 MILLIGRAM(S): at 17:09

## 2021-11-19 NOTE — H&P ADULT - NSICDXPASTSURGICALHX_GEN_ALL_CORE_FT
PAST SURGICAL HISTORY:  H/O arthroscopy of left knee     H/O foot surgery     H/O rhinoplasty     H/O total knee replacement     S/P bunionectomy

## 2021-11-19 NOTE — H&P ADULT - PROBLEM SELECTOR PLAN 1
Admit to Orthopedic Service for elective left revision forefoot reconstruction w hallux fusion,2,3,4 osteotomies  Medically cleared and optimized for surgery by Dr. Mckeon Admit to Orthopedic Service for elective left torre simin procedure, hallux MTP fusion, MADONNA  Medically cleared and optimized for surgery by Dr. Mckeon

## 2021-11-19 NOTE — H&P ADULT - HISTORY OF PRESENT ILLNESS
77 yo female with left foot pain. Patient has pain and difficulty with ambulation. Previous history of left foot surgery without complications.

## 2021-11-19 NOTE — H&P ADULT - NSHPLABSRESULTS_GEN_ALL_CORE
Preop CBC, BMP, PT/INR, PTT within normal range  Cr- .7  COVID PCR: neg, 11/16  3M: DOS  Preop CXR within normal limits, reviewed per medical clearance   Preop EKG WNL and reviewed per medical clearance

## 2021-11-20 ENCOUNTER — TRANSCRIPTION ENCOUNTER (OUTPATIENT)
Age: 76
End: 2021-11-20

## 2021-11-20 LAB
ANION GAP SERPL CALC-SCNC: 11 MMOL/L — SIGNIFICANT CHANGE UP (ref 5–17)
BUN SERPL-MCNC: 19 MG/DL — SIGNIFICANT CHANGE UP (ref 7–23)
CALCIUM SERPL-MCNC: 9.5 MG/DL — SIGNIFICANT CHANGE UP (ref 8.4–10.5)
CHLORIDE SERPL-SCNC: 104 MMOL/L — SIGNIFICANT CHANGE UP (ref 96–108)
CO2 SERPL-SCNC: 22 MMOL/L — SIGNIFICANT CHANGE UP (ref 22–31)
COVID-19 NUCLEOCAPSID GAM AB INTERP: NEGATIVE — SIGNIFICANT CHANGE UP
COVID-19 NUCLEOCAPSID TOTAL GAM ANTIBODY RESULT: 0.08 INDEX — SIGNIFICANT CHANGE UP
COVID-19 SPIKE DOMAIN AB INTERP: POSITIVE
COVID-19 SPIKE DOMAIN ANTIBODY RESULT: >250 U/ML — HIGH
CREAT SERPL-MCNC: 0.78 MG/DL — SIGNIFICANT CHANGE UP (ref 0.5–1.3)
GLUCOSE SERPL-MCNC: 99 MG/DL — SIGNIFICANT CHANGE UP (ref 70–99)
HCT VFR BLD CALC: 35.4 % — SIGNIFICANT CHANGE UP (ref 34.5–45)
HGB BLD-MCNC: 11.3 G/DL — LOW (ref 11.5–15.5)
MCHC RBC-ENTMCNC: 27.7 PG — SIGNIFICANT CHANGE UP (ref 27–34)
MCHC RBC-ENTMCNC: 31.9 GM/DL — LOW (ref 32–36)
MCV RBC AUTO: 86.8 FL — SIGNIFICANT CHANGE UP (ref 80–100)
NRBC # BLD: 0 /100 WBCS — SIGNIFICANT CHANGE UP (ref 0–0)
PLATELET # BLD AUTO: 259 K/UL — SIGNIFICANT CHANGE UP (ref 150–400)
POTASSIUM SERPL-MCNC: 4 MMOL/L — SIGNIFICANT CHANGE UP (ref 3.5–5.3)
POTASSIUM SERPL-SCNC: 4 MMOL/L — SIGNIFICANT CHANGE UP (ref 3.5–5.3)
RBC # BLD: 4.08 M/UL — SIGNIFICANT CHANGE UP (ref 3.8–5.2)
RBC # FLD: 14.2 % — SIGNIFICANT CHANGE UP (ref 10.3–14.5)
SARS-COV-2 IGG+IGM SERPL QL IA: 0.08 INDEX — SIGNIFICANT CHANGE UP
SARS-COV-2 IGG+IGM SERPL QL IA: >250 U/ML — HIGH
SARS-COV-2 IGG+IGM SERPL QL IA: NEGATIVE — SIGNIFICANT CHANGE UP
SARS-COV-2 IGG+IGM SERPL QL IA: POSITIVE
SODIUM SERPL-SCNC: 137 MMOL/L — SIGNIFICANT CHANGE UP (ref 135–145)
WBC # BLD: 8.84 K/UL — SIGNIFICANT CHANGE UP (ref 3.8–10.5)
WBC # FLD AUTO: 8.84 K/UL — SIGNIFICANT CHANGE UP (ref 3.8–10.5)

## 2021-11-20 RX ORDER — BENZOCAINE AND MENTHOL 5; 1 G/100ML; G/100ML
1 LIQUID ORAL THREE TIMES A DAY
Refills: 0 | Status: DISCONTINUED | OUTPATIENT
Start: 2021-11-20 | End: 2021-11-21

## 2021-11-20 RX ORDER — ASPIRIN/CALCIUM CARB/MAGNESIUM 324 MG
1 TABLET ORAL
Qty: 0 | Refills: 0 | DISCHARGE
Start: 2021-11-20

## 2021-11-20 RX ADMIN — SPIRONOLACTONE 25 MILLIGRAM(S): 25 TABLET, FILM COATED ORAL at 06:31

## 2021-11-20 RX ADMIN — ATORVASTATIN CALCIUM 10 MILLIGRAM(S): 80 TABLET, FILM COATED ORAL at 21:57

## 2021-11-20 RX ADMIN — Medication 81 MILLIGRAM(S): at 11:41

## 2021-11-20 RX ADMIN — BENZOCAINE AND MENTHOL 1 LOZENGE: 5; 1 LIQUID ORAL at 20:53

## 2021-11-20 NOTE — DISCHARGE NOTE PROVIDER - CARE PROVIDER_API CALL
Barak Boswell)  Orthopaedic Surgery  [address] [place] [address]  Phone: [phone]  Fax: [phone]  Follow Up Time:

## 2021-11-20 NOTE — DISCHARGE NOTE PROVIDER - NSDCCPCAREPLAN_GEN_ALL_CORE_FT
PRINCIPAL DISCHARGE DIAGNOSIS  Diagnosis: Hallux valgus, left  Assessment and Plan of Treatment:

## 2021-11-20 NOTE — DISCHARGE NOTE PROVIDER - NSDCMRMEDTOKEN_GEN_ALL_CORE_FT
aspirin 81 mg oral delayed release tablet: 1 tab(s) orally once a day  Lipitor 10 mg oral tablet: 1 tab(s) orally once a day  spironolactone 25 mg oral tablet: 1 tab(s) orally 2 times a day  Valtrex 500 mg oral tablet: 1 tab(s) orally once a day, As Needed   aspirin 81 mg oral delayed release tablet: 1 tab(s) orally once a day  Lipitor 10 mg oral tablet: 1 tab(s) orally once a day  Rolling Walker: 1   spironolactone 25 mg oral tablet: 1 tab(s) orally 2 times a day  Valtrex 500 mg oral tablet: 1 tab(s) orally once a day, As Needed

## 2021-11-20 NOTE — DISCHARGE NOTE PROVIDER - NSDCFUADDINST_GEN_ALL_CORE_FT
No strenuous activity, heavy lifting, driving or returning to work until cleared by MD.  You may shower - keep splint/dressing dry at all times.  Try to have regular bowel movements, take stool softener or laxative if necessary.  May take pepcid or zantac for upset stomach.  Swelling may travel all the way down leg to foot, this is normal and will subside in a few weeks.  Follow up with Dr. Boswell to schedule an appt, if you have staples or sutures they will be removed in office.  Contact your doctor if you experience: fever greater than 101.5, chills, chest pain, difficulty breathing, redness or excessive drainage around the incision, other concerns.   No strenuous activity, heavy lifting, driving or returning to work until cleared by MD.  Non-weight bearing left lower extremity with assistive devices.  keep splint/dressing dry at all times.  Try to have regular bowel movements, take stool softener or laxative if necessary.  Swelling may travel all the way down leg to foot, this is normal and will subside in a few weeks.  Follow up with Dr. Boswell to schedule an appt, if you have staples or sutures they will be removed in office.  Contact your doctor if you experience: fever greater than 101.5, chills, chest pain, difficulty breathing, redness or excessive drainage around the incision, other concerns.   No strenuous activity, heavy lifting, driving or returning to work until cleared by MD.  Heel weight bearing left lower extremity with assistive devices.  keep splint/dressing dry at all times.  Try to have regular bowel movements, take stool softener or laxative if necessary.  Swelling may travel all the way down leg to foot, this is normal and will subside in a few weeks.  Follow up with Dr. Boswell to schedule an appt, if you have staples or sutures they will be removed in office.  Contact your doctor if you experience: fever greater than 101.5, chills, chest pain, difficulty breathing, redness or excessive drainage around the incision, other concerns.

## 2021-11-20 NOTE — DISCHARGE NOTE PROVIDER - HOSPITAL COURSE
Admitted  Surgery  Lissette-op Antibiotics  Pain control  DVT prophylaxis  OOB/Physical Therapy Admitted  Surgery- left forefoot reconstruction  Lissette-op Antibiotics  Pain control  DVT prophylaxis  OOB/Physical Therapy  NWB LLE with walker

## 2021-11-21 LAB
ANION GAP SERPL CALC-SCNC: 9 MMOL/L — SIGNIFICANT CHANGE UP (ref 5–17)
BUN SERPL-MCNC: 15 MG/DL — SIGNIFICANT CHANGE UP (ref 7–23)
CALCIUM SERPL-MCNC: 9.3 MG/DL — SIGNIFICANT CHANGE UP (ref 8.4–10.5)
CHLORIDE SERPL-SCNC: 104 MMOL/L — SIGNIFICANT CHANGE UP (ref 96–108)
CO2 SERPL-SCNC: 24 MMOL/L — SIGNIFICANT CHANGE UP (ref 22–31)
CREAT SERPL-MCNC: 0.69 MG/DL — SIGNIFICANT CHANGE UP (ref 0.5–1.3)
GLUCOSE SERPL-MCNC: 104 MG/DL — HIGH (ref 70–99)
HCT VFR BLD CALC: 34.9 % — SIGNIFICANT CHANGE UP (ref 34.5–45)
HGB BLD-MCNC: 11.5 G/DL — SIGNIFICANT CHANGE UP (ref 11.5–15.5)
MCHC RBC-ENTMCNC: 28.3 PG — SIGNIFICANT CHANGE UP (ref 27–34)
MCHC RBC-ENTMCNC: 33 GM/DL — SIGNIFICANT CHANGE UP (ref 32–36)
MCV RBC AUTO: 85.7 FL — SIGNIFICANT CHANGE UP (ref 80–100)
NRBC # BLD: 0 /100 WBCS — SIGNIFICANT CHANGE UP (ref 0–0)
PLATELET # BLD AUTO: 241 K/UL — SIGNIFICANT CHANGE UP (ref 150–400)
POTASSIUM SERPL-MCNC: 3.8 MMOL/L — SIGNIFICANT CHANGE UP (ref 3.5–5.3)
POTASSIUM SERPL-SCNC: 3.8 MMOL/L — SIGNIFICANT CHANGE UP (ref 3.5–5.3)
RBC # BLD: 4.07 M/UL — SIGNIFICANT CHANGE UP (ref 3.8–5.2)
RBC # FLD: 14.1 % — SIGNIFICANT CHANGE UP (ref 10.3–14.5)
SODIUM SERPL-SCNC: 137 MMOL/L — SIGNIFICANT CHANGE UP (ref 135–145)
WBC # BLD: 6.73 K/UL — SIGNIFICANT CHANGE UP (ref 3.8–10.5)
WBC # FLD AUTO: 6.73 K/UL — SIGNIFICANT CHANGE UP (ref 3.8–10.5)

## 2021-11-21 RX ORDER — BENZOCAINE AND MENTHOL 5; 1 G/100ML; G/100ML
1 LIQUID ORAL THREE TIMES A DAY
Refills: 0 | Status: DISCONTINUED | OUTPATIENT
Start: 2021-11-21 | End: 2021-11-28

## 2021-11-21 RX ADMIN — OXYCODONE HYDROCHLORIDE 10 MILLIGRAM(S): 5 TABLET ORAL at 17:22

## 2021-11-21 RX ADMIN — OXYCODONE HYDROCHLORIDE 10 MILLIGRAM(S): 5 TABLET ORAL at 18:22

## 2021-11-21 RX ADMIN — ATORVASTATIN CALCIUM 10 MILLIGRAM(S): 80 TABLET, FILM COATED ORAL at 21:53

## 2021-11-21 RX ADMIN — BENZOCAINE AND MENTHOL 1 LOZENGE: 5; 1 LIQUID ORAL at 00:54

## 2021-11-21 RX ADMIN — Medication 81 MILLIGRAM(S): at 11:12

## 2021-11-21 NOTE — PHYSICAL THERAPY INITIAL EVALUATION ADULT - ADDITIONAL COMMENTS
Patient lives with spouse in elevator accessible apartment. Does not use any Assistive Devices at baseline. Patient's spouse won't be able assist with ADLs. Denies recent Hx of falls.

## 2021-11-21 NOTE — PHYSICAL THERAPY INITIAL EVALUATION ADULT - MANUAL MUSCLE TESTING RESULTS, REHAB EVAL
B UE and B LE >3+/5 upon functional assessment against gravity except for ankle/grossly assessed due to

## 2021-11-21 NOTE — PHYSICAL THERAPY INITIAL EVALUATION ADULT - GENERAL OBSERVATIONS, REHAB EVAL
Patient received semi-rodriguez in bed  in NAD on RA, +SCD, +Heplock. Cleared by CINDY Soto. Agreeable to PT.

## 2021-11-22 LAB
ANION GAP SERPL CALC-SCNC: 9 MMOL/L — SIGNIFICANT CHANGE UP (ref 5–17)
BUN SERPL-MCNC: 18 MG/DL — SIGNIFICANT CHANGE UP (ref 7–23)
CALCIUM SERPL-MCNC: 8.9 MG/DL — SIGNIFICANT CHANGE UP (ref 8.4–10.5)
CHLORIDE SERPL-SCNC: 104 MMOL/L — SIGNIFICANT CHANGE UP (ref 96–108)
CO2 SERPL-SCNC: 24 MMOL/L — SIGNIFICANT CHANGE UP (ref 22–31)
CREAT SERPL-MCNC: 0.65 MG/DL — SIGNIFICANT CHANGE UP (ref 0.5–1.3)
GLUCOSE SERPL-MCNC: 101 MG/DL — HIGH (ref 70–99)
HCT VFR BLD CALC: 35.1 % — SIGNIFICANT CHANGE UP (ref 34.5–45)
HGB BLD-MCNC: 11.4 G/DL — LOW (ref 11.5–15.5)
MCHC RBC-ENTMCNC: 28.1 PG — SIGNIFICANT CHANGE UP (ref 27–34)
MCHC RBC-ENTMCNC: 32.5 GM/DL — SIGNIFICANT CHANGE UP (ref 32–36)
MCV RBC AUTO: 86.7 FL — SIGNIFICANT CHANGE UP (ref 80–100)
NRBC # BLD: 0 /100 WBCS — SIGNIFICANT CHANGE UP (ref 0–0)
PLATELET # BLD AUTO: 242 K/UL — SIGNIFICANT CHANGE UP (ref 150–400)
POTASSIUM SERPL-MCNC: 3.7 MMOL/L — SIGNIFICANT CHANGE UP (ref 3.5–5.3)
POTASSIUM SERPL-SCNC: 3.7 MMOL/L — SIGNIFICANT CHANGE UP (ref 3.5–5.3)
RBC # BLD: 4.05 M/UL — SIGNIFICANT CHANGE UP (ref 3.8–5.2)
RBC # FLD: 14.1 % — SIGNIFICANT CHANGE UP (ref 10.3–14.5)
SODIUM SERPL-SCNC: 137 MMOL/L — SIGNIFICANT CHANGE UP (ref 135–145)
WBC # BLD: 5.75 K/UL — SIGNIFICANT CHANGE UP (ref 3.8–10.5)
WBC # FLD AUTO: 5.75 K/UL — SIGNIFICANT CHANGE UP (ref 3.8–10.5)

## 2021-11-22 RX ADMIN — SPIRONOLACTONE 25 MILLIGRAM(S): 25 TABLET, FILM COATED ORAL at 05:08

## 2021-11-22 RX ADMIN — ATORVASTATIN CALCIUM 10 MILLIGRAM(S): 80 TABLET, FILM COATED ORAL at 21:36

## 2021-11-22 RX ADMIN — Medication 81 MILLIGRAM(S): at 11:24

## 2021-11-23 LAB
ANION GAP SERPL CALC-SCNC: 7 MMOL/L — SIGNIFICANT CHANGE UP (ref 5–17)
BUN SERPL-MCNC: 17 MG/DL — SIGNIFICANT CHANGE UP (ref 7–23)
CALCIUM SERPL-MCNC: 9.7 MG/DL — SIGNIFICANT CHANGE UP (ref 8.4–10.5)
CHLORIDE SERPL-SCNC: 103 MMOL/L — SIGNIFICANT CHANGE UP (ref 96–108)
CO2 SERPL-SCNC: 27 MMOL/L — SIGNIFICANT CHANGE UP (ref 22–31)
CREAT SERPL-MCNC: 0.63 MG/DL — SIGNIFICANT CHANGE UP (ref 0.5–1.3)
GLUCOSE SERPL-MCNC: 106 MG/DL — HIGH (ref 70–99)
HCT VFR BLD CALC: 38.4 % — SIGNIFICANT CHANGE UP (ref 34.5–45)
HGB BLD-MCNC: 12.1 G/DL — SIGNIFICANT CHANGE UP (ref 11.5–15.5)
MCHC RBC-ENTMCNC: 27.3 PG — SIGNIFICANT CHANGE UP (ref 27–34)
MCHC RBC-ENTMCNC: 31.5 GM/DL — LOW (ref 32–36)
MCV RBC AUTO: 86.5 FL — SIGNIFICANT CHANGE UP (ref 80–100)
NRBC # BLD: 0 /100 WBCS — SIGNIFICANT CHANGE UP (ref 0–0)
PLATELET # BLD AUTO: 264 K/UL — SIGNIFICANT CHANGE UP (ref 150–400)
POTASSIUM SERPL-MCNC: 4.1 MMOL/L — SIGNIFICANT CHANGE UP (ref 3.5–5.3)
POTASSIUM SERPL-SCNC: 4.1 MMOL/L — SIGNIFICANT CHANGE UP (ref 3.5–5.3)
RBC # BLD: 4.44 M/UL — SIGNIFICANT CHANGE UP (ref 3.8–5.2)
RBC # FLD: 13.8 % — SIGNIFICANT CHANGE UP (ref 10.3–14.5)
SODIUM SERPL-SCNC: 137 MMOL/L — SIGNIFICANT CHANGE UP (ref 135–145)
WBC # BLD: 5.26 K/UL — SIGNIFICANT CHANGE UP (ref 3.8–10.5)
WBC # FLD AUTO: 5.26 K/UL — SIGNIFICANT CHANGE UP (ref 3.8–10.5)

## 2021-11-23 RX ORDER — PANTOPRAZOLE SODIUM 20 MG/1
40 TABLET, DELAYED RELEASE ORAL
Refills: 0 | Status: DISCONTINUED | OUTPATIENT
Start: 2021-11-23 | End: 2021-11-28

## 2021-11-23 RX ADMIN — ATORVASTATIN CALCIUM 10 MILLIGRAM(S): 80 TABLET, FILM COATED ORAL at 21:49

## 2021-11-23 RX ADMIN — PANTOPRAZOLE SODIUM 40 MILLIGRAM(S): 20 TABLET, DELAYED RELEASE ORAL at 16:42

## 2021-11-23 RX ADMIN — Medication 81 MILLIGRAM(S): at 12:58

## 2021-11-23 RX ADMIN — SPIRONOLACTONE 25 MILLIGRAM(S): 25 TABLET, FILM COATED ORAL at 05:54

## 2021-11-23 NOTE — OCCUPATIONAL THERAPY INITIAL EVALUATION ADULT - GENERAL OBSERVATIONS, REHAB EVAL
Pt cleared for OT by CINDY Jeffrey. Pt received semi-rodriguez, NAD, +LLE splint and ace wrap c/d/i, +R scd, +IV heplock.

## 2021-11-23 NOTE — OCCUPATIONAL THERAPY INITIAL EVALUATION ADULT - PERTINENT HX OF CURRENT PROBLEM, REHAB EVAL
75 yo female with left foot pain. Patient has pain and difficulty with ambulation. Previous history of left foot surgery without complications. S/p L foot reconstruction 11/20/21.

## 2021-11-23 NOTE — DIETITIAN INITIAL EVALUATION ADULT. - OTHER CALCULATIONS
IBW used for calculations as pt >120% of IBW (161%). Needs adjusted for wound healing, advanced age, MO.

## 2021-11-23 NOTE — DIETITIAN INITIAL EVALUATION ADULT. - PROBLEM SELECTOR PLAN 1
Admit to Orthopedic Service for elective left torre simin procedure, hallux MTP fusion, MADONNA  Medically cleared and optimized for surgery by Dr. Mckeon

## 2021-11-23 NOTE — DIETITIAN INITIAL EVALUATION ADULT. - OTHER INFO
76F with hd os dyslipidemia, enlarged aorta, heart murmurs presenting with worsening left foot pain now s/p left forefoot reconstruction 11/19. Pt prefers home over rehab- team to confirm d/c plan.     On assessment, pt resting in bed without complaints. Currently on regular diet tolerating Po well. Pt consistently meeting >50% of meals. No reported n/v/d- currently ordered for reglan. No abd pain or distention noted. Education provided on importance of adequate PO intake with emphasis on lean protein to support wound healing- pt receptive. Pt noting good appetite PTA. UBW is consistent with admission weight- pt denies weight changes. NKFA. Skin: Surgical incisions, melissa score 20, +1 pitting edema left ankle and foot. No pain noted at this time- well controlled at time of assessment. Please see nutrition recs below. RD to follow

## 2021-11-23 NOTE — OCCUPATIONAL THERAPY INITIAL EVALUATION ADULT - STANDING BALANCE: DYNAMIC, REHAB EVAL
Pt able to perform stand pivot transfer from bed to chair with Ellen/CGA 2/2 decreased safety awareness and eccentric control. Pt using RW./poor plus

## 2021-11-24 RX ORDER — LACTULOSE 10 G/15ML
20 SOLUTION ORAL ONCE
Refills: 0 | Status: COMPLETED | OUTPATIENT
Start: 2021-11-24 | End: 2021-11-24

## 2021-11-24 RX ORDER — POLYETHYLENE GLYCOL 3350 17 G/17G
17 POWDER, FOR SOLUTION ORAL DAILY
Refills: 0 | Status: DISCONTINUED | OUTPATIENT
Start: 2021-11-24 | End: 2021-11-28

## 2021-11-24 RX ORDER — SENNA PLUS 8.6 MG/1
2 TABLET ORAL AT BEDTIME
Refills: 0 | Status: DISCONTINUED | OUTPATIENT
Start: 2021-11-24 | End: 2021-11-28

## 2021-11-24 RX ADMIN — POLYETHYLENE GLYCOL 3350 17 GRAM(S): 17 POWDER, FOR SOLUTION ORAL at 12:58

## 2021-11-24 RX ADMIN — SENNA PLUS 2 TABLET(S): 8.6 TABLET ORAL at 21:00

## 2021-11-24 RX ADMIN — Medication 81 MILLIGRAM(S): at 12:57

## 2021-11-24 RX ADMIN — PANTOPRAZOLE SODIUM 40 MILLIGRAM(S): 20 TABLET, DELAYED RELEASE ORAL at 06:29

## 2021-11-24 RX ADMIN — SPIRONOLACTONE 25 MILLIGRAM(S): 25 TABLET, FILM COATED ORAL at 06:29

## 2021-11-24 RX ADMIN — LACTULOSE 20 GRAM(S): 10 SOLUTION ORAL at 12:58

## 2021-11-24 RX ADMIN — ATORVASTATIN CALCIUM 10 MILLIGRAM(S): 80 TABLET, FILM COATED ORAL at 21:00

## 2021-11-25 LAB
ANION GAP SERPL CALC-SCNC: 10 MMOL/L — SIGNIFICANT CHANGE UP (ref 5–17)
BUN SERPL-MCNC: 18 MG/DL — SIGNIFICANT CHANGE UP (ref 7–23)
CALCIUM SERPL-MCNC: 9.2 MG/DL — SIGNIFICANT CHANGE UP (ref 8.4–10.5)
CHLORIDE SERPL-SCNC: 103 MMOL/L — SIGNIFICANT CHANGE UP (ref 96–108)
CO2 SERPL-SCNC: 23 MMOL/L — SIGNIFICANT CHANGE UP (ref 22–31)
CREAT SERPL-MCNC: 0.7 MG/DL — SIGNIFICANT CHANGE UP (ref 0.5–1.3)
GLUCOSE SERPL-MCNC: 114 MG/DL — HIGH (ref 70–99)
HCT VFR BLD CALC: 37.8 % — SIGNIFICANT CHANGE UP (ref 34.5–45)
HGB BLD-MCNC: 12.1 G/DL — SIGNIFICANT CHANGE UP (ref 11.5–15.5)
MCHC RBC-ENTMCNC: 27.6 PG — SIGNIFICANT CHANGE UP (ref 27–34)
MCHC RBC-ENTMCNC: 32 GM/DL — SIGNIFICANT CHANGE UP (ref 32–36)
MCV RBC AUTO: 86.1 FL — SIGNIFICANT CHANGE UP (ref 80–100)
NRBC # BLD: 0 /100 WBCS — SIGNIFICANT CHANGE UP (ref 0–0)
PLATELET # BLD AUTO: 265 K/UL — SIGNIFICANT CHANGE UP (ref 150–400)
POTASSIUM SERPL-MCNC: 3.7 MMOL/L — SIGNIFICANT CHANGE UP (ref 3.5–5.3)
POTASSIUM SERPL-SCNC: 3.7 MMOL/L — SIGNIFICANT CHANGE UP (ref 3.5–5.3)
RBC # BLD: 4.39 M/UL — SIGNIFICANT CHANGE UP (ref 3.8–5.2)
RBC # FLD: 13.8 % — SIGNIFICANT CHANGE UP (ref 10.3–14.5)
SODIUM SERPL-SCNC: 136 MMOL/L — SIGNIFICANT CHANGE UP (ref 135–145)
WBC # BLD: 4.72 K/UL — SIGNIFICANT CHANGE UP (ref 3.8–10.5)
WBC # FLD AUTO: 4.72 K/UL — SIGNIFICANT CHANGE UP (ref 3.8–10.5)

## 2021-11-25 RX ORDER — SPIRONOLACTONE 25 MG/1
25 TABLET, FILM COATED ORAL DAILY
Refills: 0 | Status: DISCONTINUED | OUTPATIENT
Start: 2021-11-25 | End: 2021-11-28

## 2021-11-25 RX ORDER — LANOLIN ALCOHOL/MO/W.PET/CERES
5 CREAM (GRAM) TOPICAL AT BEDTIME
Refills: 0 | Status: DISCONTINUED | OUTPATIENT
Start: 2021-11-25 | End: 2021-11-28

## 2021-11-25 RX ADMIN — SPIRONOLACTONE 25 MILLIGRAM(S): 25 TABLET, FILM COATED ORAL at 05:33

## 2021-11-25 RX ADMIN — Medication 81 MILLIGRAM(S): at 11:56

## 2021-11-25 RX ADMIN — ATORVASTATIN CALCIUM 10 MILLIGRAM(S): 80 TABLET, FILM COATED ORAL at 21:28

## 2021-11-25 NOTE — PROGRESS NOTE ADULT - ASSESSMENT
A/P: 76yFemale s/p L forefoot reconstruction on 11/19  - Stable  - Pain/Nausea Control  - Home meds  - DVT ppx: ASA  - WBS: Heel WB LLE  - PT: rec'd NAOMI, patient refusing -- progressing toward home  - Discharge pending PT clearance      Ortho Pager [phone]

## 2021-11-26 LAB
ANION GAP SERPL CALC-SCNC: 10 MMOL/L — SIGNIFICANT CHANGE UP (ref 5–17)
BUN SERPL-MCNC: 18 MG/DL — SIGNIFICANT CHANGE UP (ref 7–23)
CALCIUM SERPL-MCNC: 9.4 MG/DL — SIGNIFICANT CHANGE UP (ref 8.4–10.5)
CHLORIDE SERPL-SCNC: 105 MMOL/L — SIGNIFICANT CHANGE UP (ref 96–108)
CO2 SERPL-SCNC: 22 MMOL/L — SIGNIFICANT CHANGE UP (ref 22–31)
CREAT SERPL-MCNC: 0.69 MG/DL — SIGNIFICANT CHANGE UP (ref 0.5–1.3)
GLUCOSE SERPL-MCNC: 100 MG/DL — HIGH (ref 70–99)
HCT VFR BLD CALC: 38.9 % — SIGNIFICANT CHANGE UP (ref 34.5–45)
HGB BLD-MCNC: 12.2 G/DL — SIGNIFICANT CHANGE UP (ref 11.5–15.5)
MCHC RBC-ENTMCNC: 27.2 PG — SIGNIFICANT CHANGE UP (ref 27–34)
MCHC RBC-ENTMCNC: 31.4 GM/DL — LOW (ref 32–36)
MCV RBC AUTO: 86.6 FL — SIGNIFICANT CHANGE UP (ref 80–100)
NRBC # BLD: 0 /100 WBCS — SIGNIFICANT CHANGE UP (ref 0–0)
PLATELET # BLD AUTO: 268 K/UL — SIGNIFICANT CHANGE UP (ref 150–400)
POTASSIUM SERPL-MCNC: 3.9 MMOL/L — SIGNIFICANT CHANGE UP (ref 3.5–5.3)
POTASSIUM SERPL-SCNC: 3.9 MMOL/L — SIGNIFICANT CHANGE UP (ref 3.5–5.3)
RBC # BLD: 4.49 M/UL — SIGNIFICANT CHANGE UP (ref 3.8–5.2)
RBC # FLD: 14 % — SIGNIFICANT CHANGE UP (ref 10.3–14.5)
SODIUM SERPL-SCNC: 137 MMOL/L — SIGNIFICANT CHANGE UP (ref 135–145)
WBC # BLD: 5.36 K/UL — SIGNIFICANT CHANGE UP (ref 3.8–10.5)
WBC # FLD AUTO: 5.36 K/UL — SIGNIFICANT CHANGE UP (ref 3.8–10.5)

## 2021-11-26 RX ADMIN — SENNA PLUS 2 TABLET(S): 8.6 TABLET ORAL at 21:33

## 2021-11-26 RX ADMIN — SPIRONOLACTONE 25 MILLIGRAM(S): 25 TABLET, FILM COATED ORAL at 05:18

## 2021-11-26 RX ADMIN — ATORVASTATIN CALCIUM 10 MILLIGRAM(S): 80 TABLET, FILM COATED ORAL at 21:32

## 2021-11-26 RX ADMIN — Medication 81 MILLIGRAM(S): at 11:40

## 2021-11-27 LAB
ANION GAP SERPL CALC-SCNC: 10 MMOL/L — SIGNIFICANT CHANGE UP (ref 5–17)
BUN SERPL-MCNC: 19 MG/DL — SIGNIFICANT CHANGE UP (ref 7–23)
CALCIUM SERPL-MCNC: 9.6 MG/DL — SIGNIFICANT CHANGE UP (ref 8.4–10.5)
CHLORIDE SERPL-SCNC: 103 MMOL/L — SIGNIFICANT CHANGE UP (ref 96–108)
CO2 SERPL-SCNC: 25 MMOL/L — SIGNIFICANT CHANGE UP (ref 22–31)
CREAT SERPL-MCNC: 0.7 MG/DL — SIGNIFICANT CHANGE UP (ref 0.5–1.3)
GLUCOSE SERPL-MCNC: 92 MG/DL — SIGNIFICANT CHANGE UP (ref 70–99)
HCT VFR BLD CALC: 38.5 % — SIGNIFICANT CHANGE UP (ref 34.5–45)
HGB BLD-MCNC: 12 G/DL — SIGNIFICANT CHANGE UP (ref 11.5–15.5)
MCHC RBC-ENTMCNC: 27.3 PG — SIGNIFICANT CHANGE UP (ref 27–34)
MCHC RBC-ENTMCNC: 31.2 GM/DL — LOW (ref 32–36)
MCV RBC AUTO: 87.5 FL — SIGNIFICANT CHANGE UP (ref 80–100)
NRBC # BLD: 0 /100 WBCS — SIGNIFICANT CHANGE UP (ref 0–0)
PLATELET # BLD AUTO: 273 K/UL — SIGNIFICANT CHANGE UP (ref 150–400)
POTASSIUM SERPL-MCNC: 3.7 MMOL/L — SIGNIFICANT CHANGE UP (ref 3.5–5.3)
POTASSIUM SERPL-SCNC: 3.7 MMOL/L — SIGNIFICANT CHANGE UP (ref 3.5–5.3)
RBC # BLD: 4.4 M/UL — SIGNIFICANT CHANGE UP (ref 3.8–5.2)
RBC # FLD: 13.9 % — SIGNIFICANT CHANGE UP (ref 10.3–14.5)
SODIUM SERPL-SCNC: 138 MMOL/L — SIGNIFICANT CHANGE UP (ref 135–145)
WBC # BLD: 5.66 K/UL — SIGNIFICANT CHANGE UP (ref 3.8–10.5)
WBC # FLD AUTO: 5.66 K/UL — SIGNIFICANT CHANGE UP (ref 3.8–10.5)

## 2021-11-27 RX ADMIN — ATORVASTATIN CALCIUM 10 MILLIGRAM(S): 80 TABLET, FILM COATED ORAL at 21:25

## 2021-11-27 RX ADMIN — Medication 81 MILLIGRAM(S): at 12:27

## 2021-11-27 RX ADMIN — SPIRONOLACTONE 25 MILLIGRAM(S): 25 TABLET, FILM COATED ORAL at 05:32

## 2021-11-27 RX ADMIN — Medication 30 MILLILITER(S): at 21:59

## 2021-11-28 ENCOUNTER — TRANSCRIPTION ENCOUNTER (OUTPATIENT)
Age: 76
End: 2021-11-28

## 2021-11-28 VITALS
DIASTOLIC BLOOD PRESSURE: 65 MMHG | OXYGEN SATURATION: 97 % | SYSTOLIC BLOOD PRESSURE: 119 MMHG | TEMPERATURE: 98 F | RESPIRATION RATE: 16 BRPM | HEART RATE: 71 BPM

## 2021-11-28 LAB
ANION GAP SERPL CALC-SCNC: 11 MMOL/L — SIGNIFICANT CHANGE UP (ref 5–17)
BUN SERPL-MCNC: 15 MG/DL — SIGNIFICANT CHANGE UP (ref 7–23)
CALCIUM SERPL-MCNC: 9.5 MG/DL — SIGNIFICANT CHANGE UP (ref 8.4–10.5)
CHLORIDE SERPL-SCNC: 104 MMOL/L — SIGNIFICANT CHANGE UP (ref 96–108)
CO2 SERPL-SCNC: 23 MMOL/L — SIGNIFICANT CHANGE UP (ref 22–31)
CREAT SERPL-MCNC: 0.67 MG/DL — SIGNIFICANT CHANGE UP (ref 0.5–1.3)
GLUCOSE SERPL-MCNC: 108 MG/DL — HIGH (ref 70–99)
HCT VFR BLD CALC: 39.2 % — SIGNIFICANT CHANGE UP (ref 34.5–45)
HGB BLD-MCNC: 12.6 G/DL — SIGNIFICANT CHANGE UP (ref 11.5–15.5)
MCHC RBC-ENTMCNC: 27.5 PG — SIGNIFICANT CHANGE UP (ref 27–34)
MCHC RBC-ENTMCNC: 32.1 GM/DL — SIGNIFICANT CHANGE UP (ref 32–36)
MCV RBC AUTO: 85.6 FL — SIGNIFICANT CHANGE UP (ref 80–100)
NRBC # BLD: 0 /100 WBCS — SIGNIFICANT CHANGE UP (ref 0–0)
PLATELET # BLD AUTO: 282 K/UL — SIGNIFICANT CHANGE UP (ref 150–400)
POTASSIUM SERPL-MCNC: 4.2 MMOL/L — SIGNIFICANT CHANGE UP (ref 3.5–5.3)
POTASSIUM SERPL-SCNC: 4.2 MMOL/L — SIGNIFICANT CHANGE UP (ref 3.5–5.3)
RBC # BLD: 4.58 M/UL — SIGNIFICANT CHANGE UP (ref 3.8–5.2)
RBC # FLD: 13.7 % — SIGNIFICANT CHANGE UP (ref 10.3–14.5)
SODIUM SERPL-SCNC: 138 MMOL/L — SIGNIFICANT CHANGE UP (ref 135–145)
WBC # BLD: 5.84 K/UL — SIGNIFICANT CHANGE UP (ref 3.8–10.5)
WBC # FLD AUTO: 5.84 K/UL — SIGNIFICANT CHANGE UP (ref 3.8–10.5)

## 2021-11-28 RX ADMIN — SPIRONOLACTONE 25 MILLIGRAM(S): 25 TABLET, FILM COATED ORAL at 06:58

## 2021-11-28 RX ADMIN — PANTOPRAZOLE SODIUM 40 MILLIGRAM(S): 20 TABLET, DELAYED RELEASE ORAL at 06:58

## 2021-11-28 RX ADMIN — Medication 81 MILLIGRAM(S): at 12:59

## 2021-11-28 NOTE — DISCHARGE NOTE NURSING/CASE MANAGEMENT/SOCIAL WORK - PATIENT PORTAL LINK FT
You can access the FollowMyHealth Patient Portal offered by [facility] by registering at the following website: [email or web address]. By joining [employer]’s FollowMyHealth portal, you will also be able to view your health information using other applications (apps) compatible with our system.

## 2021-11-28 NOTE — PROGRESS NOTE ADULT - SUBJECTIVE AND OBJECTIVE BOX
Ortho Note    Pt comfortable without complaints, pain controlled  Denies CP, SOB, N/V, numbness/tingling     `Vital Signs Last 24 Hrs  T(C): 36.7 (28 Nov 2021 04:30), Max: 36.7 (27 Nov 2021 09:10)  T(F): 98.1 (28 Nov 2021 04:30), Max: 98.1 (28 Nov 2021 04:30)  HR: 82 (28 Nov 2021 04:30) (69 - 92)  BP: 144/91 (28 Nov 2021 04:30) (114/74 - 144/91)  BP(mean): 109 (28 Nov 2021 04:30) (92 - 109)  RR: 17 (28 Nov 2021 04:30) (16 - 17)  SpO2: 96% (28 Nov 2021 04:30) (96% - 99%)    General: A&Ox3, NAD  LLE: Short leg splint C/D/I  Vascular: Toes WWP; Cap refill < 2 sec  Sensation: SILT in toes  Motor: Motor function intact in toes    A/P: 76yFemale s/p L forefoot reconstruction on 11/19. Splint changed 11/25. Cleared PT on 11/27.  - Stable  - Pain/Nausea Control  - Home meds  - DVT ppx: ASA  - WBS: Heel WB LLE  - Dispo: PT rec'd NAOMI, patient refusing -- d/c home today    Ortho Pager [phone]
Orthopaedic Surgery Progress Note    Post-operative day #3 s/p left forefoot reconstruction     Subjective:     Patient seen and examined. Patient comfortable without complaints, pain controlled. Patient hesitant about going to rehab, prefers to go home.   Denies chest pain, numbness/tingling.    Objective:    Vital Signs Last 24 Hrs  T(C): 36.6 (11-22-21 @ 08:05), Max: 36.6 (11-22-21 @ 08:05)  T(F): 97.8 (11-22-21 @ 08:05), Max: 97.8 (11-22-21 @ 08:05)  HR: 69 (11-22-21 @ 08:05) (69 - 69)  BP: 106/65 (11-22-21 @ 08:05) (106/65 - 106/65)  BP(mean): --  RR: 17 (11-22-21 @ 08:05) (17 - 17)  SpO2: 94% (11-22-21 @ 08:05) (94% - 94%)  AVSS    PE:  General: Patient alert and oriented, NAD  Dressing: Clean/dry/intact left lower extremity in splint  sensation intact to left toes  firing ehl/fhl left lower extremity                               11.4   5.75  )-----------( 242      ( 22 Nov 2021 07:25 )             35.1   11-22    137  |  104  |  18  ----------------------------<  101<H>  3.7   |  24  |  0.65    Ca    8.9      22 Nov 2021 07:25        A/P: 76yFemale POD#3 s/p left forefoot reconstruction    1. Pain control as needed  2. DVT prophylaxis: ASA  3. PT, weight-bearing status: NWB LLE  4. Dispo: pending     Ortho Pager [phone]
Ortho Note    Pt comfortable without complaints, pain controlled  Denies CP, SOB, N/V, numbness/tingling   Patient still progressing slowly with PT    Vital Signs Last 24 Hrs  T(C): 36.6 (27 Nov 2021 05:51), Max: 37.1 (26 Nov 2021 09:01)  T(F): 97.9 (27 Nov 2021 05:51), Max: 98.7 (26 Nov 2021 09:01)  HR: 70 (27 Nov 2021 05:51) (70 - 78)  BP: 115/67 (27 Nov 2021 05:51) (99/63 - 124/65)  BP(mean): 83 (27 Nov 2021 05:51) (83 - 85)  RR: 16 (27 Nov 2021 05:51) (16 - 20)  SpO2: 95% (27 Nov 2021 05:51) (95% - 99%)    General: A&Ox3, NAD  LLE: Short leg splint C/D/I  Vascular: Toes WWP; Cap refill < 2 sec  Sensation: SILT in toes  Motor: Motor function intact in toes    A/P: 76yFemale s/p L forefoot reconstruction on 11/19  - Stable  - Pain/Nausea Control  - Splint changed 11/25  - Home meds  - DVT ppx: ASA  - WBS: Heel WB LLE  - PT: rec'd NAOMI, patient refusing -- progressing toward home  - Discharge pending PT clearance      Ortho Pager [phone]
Orthopaedic Surgery Post Operative Check    Procedure: left forefoot reconstruction  Surgeon: Dr. Boswell     Pt comfortable, complaining mostly of a dry throat. States foot is numb (as expected 2/2 nerve block). Denies chest pain.       Vital Signs Last 24 Hrs  T(C): 36.3 (11-19-21 @ 10:52), Max: 36.3 (11-19-21 @ 10:52)  T(F): 97.3 (11-19-21 @ 10:52), Max: 97.3 (11-19-21 @ 10:52)  HR: 60 (11-19-21 @ 11:52) (58 - 67)  BP: 124/59 (11-19-21 @ 11:52) (100/72 - 124/59)  BP(mean): 85 (11-19-21 @ 11:52) (76 - 85)  RR: 18 (11-19-21 @ 11:52) (15 - 18)  SpO2: 98% (11-19-21 @ 11:52) (96% - 100%)  AVSS    General: Pt Alert and oriented, NAD  DSG C/D/I left foot bulky dressing  sensation/motor left lower extremity  limited 2/2 nerve block      A/P: 76yFemale POD#0 s/p left forefoot reconstruction   - Stable  - Pain Control  - DVT ppx: ASA  - Post op abx:  ancef   - PT, WBS:  NWB LLE   - f/u AM labs   - f/u neurovascular exam     Ortho Pager [phone]
Due to patient's deconditioning and generalized weakness secondary to left forefoot reconstruction, patient will require a standard wheelchair. This is necessary to achieve daily tasks and therapies which cannot be achieved with the use of a cane or rolling walker. Patient and family are in agreement with wheelchair use at home and assistance will be provided as needed.     Tea Coffey PA-C
Ortho Note    Pt comfortable without complaints, pain controlled; comfortable in new splint from 11/25  Denies CP, SOB, N/V, numbness/tingling   Patient still progressing slowly with PT    Vital Signs Last 24 Hrs  T(C): 36.7 (26 Nov 2021 04:45), Max: 36.7 (26 Nov 2021 04:45)  T(F): 98.1 (26 Nov 2021 04:45), Max: 98.1 (26 Nov 2021 04:45)  HR: 69 (26 Nov 2021 04:45) (69 - 99)  BP: 107/60 (26 Nov 2021 04:45) (100/63 - 126/76)  BP(mean): 76 (26 Nov 2021 04:45) (76 - 76)  RR: 16 (26 Nov 2021 04:45) (16 - 16)  SpO2: 96% (26 Nov 2021 04:45) (95% - 96%)    General: A&Ox3, NAD  LLE: Short leg splint C/D/I  Vascular: Toes WWP; Cap refill < 2 sec  Sensation: SILT in toes  Motor: Motor function intact in toes    A/P: 76yFemale s/p L forefoot reconstruction on 11/19  - Stable  - Pain/Nausea Control  - Splint changed 11/25  - Home meds  - DVT ppx: ASA  - WBS: Heel WB LLE  - PT: rec'd NAOMI, patient refusing -- progressing toward home  - Discharge pending PT clearance      Ortho Pager [phone]
Orthopaedic Surgery Progress Note    Patient seen and examined. MARIS. Patient without complaints. Pain controlled. Denies CP, SOB, N/V, tactile fevers, calf pain. Pt is POD #7 s/p left forefoot reconstruction.      Vital Signs Last 24 Hrs  T(C): 37.1 (26 Nov 2021 09:01), Max: 37.1 (26 Nov 2021 09:01)  T(F): 98.7 (26 Nov 2021 09:01), Max: 98.7 (26 Nov 2021 09:01)  HR: 78 (26 Nov 2021 09:01) (69 - 99)  BP: 120/66 (26 Nov 2021 09:01) (101/64 - 126/76)  BP(mean): 76 (26 Nov 2021 04:45) (76 - 76)  RR: 20 (26 Nov 2021 09:01) (16 - 20)  SpO2: 96% (26 Nov 2021 09:01) (95% - 96%)        Physical Exam:  Pt laying comfortably in bed, NAD.  Skin warm and well perfused, no visible erythema/ecchymoses.  Dressing C/D/I; splint in place LLE   EHL/FHL firing left lower extremity   SLT in tact to distal left lower extremity   Brisk capillary refill distal LLE     LABS                        12.2   5.36  )-----------( 268      ( 26 Nov 2021 06:46 )             38.9                                11-26    137  |  105  |  18  ----------------------------<  100<H>  3.9   |  22  |  0.69    Ca    9.4      26 Nov 2021 06:46        A/P: 76F POD #7 s/p left forefoot reconstruction    CONTINUE:        1. PT: Heel WB LLE with walker  2. DVT prophylaxis: ASA , SCD on RLE   3. Pain Control as needed   4. Dispo: Home pending PT clearance       
Orthopaedic Surgery Progress Note    Post-operative day #4 s/p left forefoot reconstruction     Subjective:     Patient seen and examined. Patient comfortable without complaints, pain controlled.   Still prefers home over rehab, will need wheelchair and other things set up if does go home  Denies chest pain, numbness/tingling.    Objective:    Vital Signs Last 24 Hrs  T(C): 36.4 (23 Nov 2021 08:10), Max: 36.6 (23 Nov 2021 04:55)  T(F): 97.6 (23 Nov 2021 08:10), Max: 97.9 (23 Nov 2021 04:55)  HR: 71 (23 Nov 2021 08:10) (71 - 82)  BP: 140/68 (23 Nov 2021 08:10) (109/68 - 140/73)  BP(mean): --  RR: 17 (23 Nov 2021 08:10) (16 - 18)  SpO2: 97% (23 Nov 2021 08:10) (94% - 97%)    PE:  General: Patient alert and oriented, NAD  Dressing: Clean/dry/intact left lower extremity in splint  sensation intact to left toes  firing ehl/fhl left lower extremity           A/P: 76yFemale POD#4 s/p left forefoot reconstruction    1. Pain control as needed  2. DVT prophylaxis: ASA  3. PT, weight-bearing status: NWB LLE  4. Dispo: pending     Ortho Pager [phone]
Orthopaedic Surgery Progress Note    Post-operative day #5 s/p left forefoot reconstruction     Subjective:     Patient seen and examined. Patient comfortable without complaints, pain controlled.   Still prefers home over rehab, will need wheelchair and other things set up if does go home  Denies chest pain, numbness/tingling.    Objective:    Vital Signs Last 24 Hrs  T(C): 36.2 (24 Nov 2021 05:39), Max: 36.3 (23 Nov 2021 17:10)  T(F): 97.2 (24 Nov 2021 05:39), Max: 97.4 (23 Nov 2021 17:10)  HR: 65 (24 Nov 2021 05:39) (65 - 83)  BP: 117/72 (24 Nov 2021 05:39) (113/70 - 117/72)  BP(mean): --  RR: 16 (24 Nov 2021 05:39) (16 - 18)  SpO2: 96% (24 Nov 2021 05:39) (95% - 96%)  PE:  General: Patient alert and oriented, NAD  Dressing: Clean/dry/intact left lower extremity in splint  sensation intact to left toes  firing ehl/fhl left lower extremity           A/P: 76yFemale POD#5 s/p left forefoot reconstruction    1. Pain control as needed  2. DVT prophylaxis: ASA  3. PT, weight-bearing status: NWB LLE  4. Dispo: home pending PT clearance     Ortho Pager [phone]
Orthopaedic Surgery Progress Note    Pt comfortable, complaining mostly of a dry throat. States foot is numb still with nerve block. Denies chest pain.       Vital Signs Last 24 Hrs  T(C): 37.2 (19 Nov 2021 22:40), Max: 37.2 (19 Nov 2021 22:40)  T(F): 98.9 (19 Nov 2021 22:40), Max: 98.9 (19 Nov 2021 22:40)  HR: 72 (19 Nov 2021 22:40) (58 - 72)  BP: 120/65 (19 Nov 2021 22:40) (100/72 - 146/78)  BP(mean): 100 (19 Nov 2021 12:52) (76 - 100)  RR: 18 (19 Nov 2021 22:40) (15 - 18)  SpO2: 96% (19 Nov 2021 22:40) (96% - 100%)    AVSS    General: Pt Alert and oriented, NAD  DSG C/D/I left foot bulky dressing  sensation/motor left lower extremity  limited 2/2 nerve block  Wiggles toes       A/P: 76yFemale POD#1 s/p left forefoot reconstruction   - Stable  - Pain Control  - DVT ppx: ASA  - Post op abx:  ancef   - PT, WBS:  NWB LLE   - f/u AM labs   - PT for walker today then home    Ortho Pager [phone]
Orthopaedic Surgery Progress Note    Pt comfortable, complaining mostly of a dry throat. States foot is numb still with nerve block. Denies chest pain.     Vital Signs Last 24 Hrs  T(C): 36.6 (21 Nov 2021 04:50), Max: 36.9 (20 Nov 2021 20:25)  T(F): 97.8 (21 Nov 2021 04:50), Max: 98.5 (20 Nov 2021 20:25)  HR: 68 (21 Nov 2021 04:50) (68 - 86)  BP: 114/64 (21 Nov 2021 04:50) (108/57 - 139/62)  BP(mean): --  RR: 16 (21 Nov 2021 04:50) (16 - 17)  SpO2: 96% (21 Nov 2021 04:50) (95% - 98%)    AVSS    General: Pt Alert and oriented, NAD  DSG C/D/I left foot bulky dressing  sensation/motor left lower extremity  limited 2/2 nerve block  Wiggles toes       A/P: 76yFemale POD#2 s/p left forefoot reconstruction   - Stable  - Pain Control  - DVT ppx: ASA  - Post op abx:  ancef   - PT, WBS:  NWB LLE   - f/u AM labs   - PT, home if SW can get a commode and 24/7 assist    Ortho Pager [phone]
Orthopaedic Surgery Progress Note    Pt comfortable, no complaints. Has been OOB w PT    Vital Signs Last 24 Hrs  T(C): 36.6 (22 Nov 2021 08:05), Max: 36.9 (21 Nov 2021 21:32)  T(F): 97.8 (22 Nov 2021 08:05), Max: 98.4 (21 Nov 2021 21:32)  HR: 69 (22 Nov 2021 08:05) (68 - 78)  BP: 106/65 (22 Nov 2021 08:05) (105/58 - 133/76)  BP(mean): --  RR: 17 (22 Nov 2021 08:05) (17 - 18)  SpO2: 94% (22 Nov 2021 08:05) (94% - 96%)  AVSS    General: Pt Alert and oriented, NAD  DSG C/D/I left foot bulky dressing  sensation/motor left lower extremity intact distal to splint  Wiggles toes       A/P: 76yFemale POD#3 s/p left forefoot reconstruction   - Stable  - Pain Control  - DVT ppx: ASA  - Post op abx:  ancef   - PT, WBS:  NWB LLE   - f/u AM labs   - PT, home if SW can get a commode and 24/7 assist    Ortho Pager [phone]
POST OPERATIVE DAY # 4 left forefoot reconstruction with Dr. Boswell   SUBJECTIVE: Patient seen and examined.  Pt without complaints.   Refusing to consider going to [facility] despite PT recommendations. Asking when she can go home.Denies chest pain/SOB/dizziness/n/v/HA   Pain well controlled.       OBJECTIVE:   Vital Signs Last 24 Hrs  T(C): 36.4 (23 Nov 2021 08:10), Max: 36.6 (23 Nov 2021 04:55)  T(F): 97.6 (23 Nov 2021 08:10), Max: 97.9 (23 Nov 2021 04:55)  HR: 71 (23 Nov 2021 08:10) (71 - 82)  BP: 140/68 (23 Nov 2021 08:10) (109/68 - 140/73)  BP(mean): --  RR: 17 (23 Nov 2021 08:10) (16 - 18)  SpO2: 97% (23 Nov 2021 08:10) (94% - 97%)    PE:  Comfortable lying in bed, NAD, pleasant, alert, oriented         Dressing: clean/dry/intact - LLE in splint.          Sensation: intact to light touch to patient's baseline BLE          Motor exam:  firing ehl/fhl and wiggling toes.          Skin warm, well-perfused; capillary refill brisk             LABS:                  12.1   5.26  )-----------( 264      ( 23 Nov 2021 07:35 )             38.4     11-23    137  |  103  |  17  ----------------------------<  106<H>  4.1   |  27  |  0.63    Ca    9.7      23 Nov 2021 07:35      ASSESSMENT AND PLAN: POD 4 left forefoot reconstruction  1. Stable. Labs reviewed. Pt is stable for rehab but refusing NAOMI; would like to plan for home. PT aware.   2. Analgesic pain control  3. DVT prophylaxis: ASA   4. Weight Bearing Status:  NWB LLE   5. Disposition: Rec for NAOMI; plan to optimize for home as pt refuses NAOMI
POST OPERATIVE DAY # 5 left forefoot reconstruction with Dr. Boswell   SUBJECTIVE: Patient seen and examined.  Pt without complaints.   Continues to only consider home as discharge option; not interested in NAOMI despite PT recommendations. Asking when she can go home. Denies chest pain/SOB/dizziness/n/v/HA   Pain well controlled.       OBJECTIVE:     Vital Signs Last 24 Hrs  T(C): 36.5 (24 Nov 2021 08:46), Max: 36.5 (24 Nov 2021 08:46)  T(F): 97.7 (24 Nov 2021 08:46), Max: 97.7 (24 Nov 2021 08:46)  HR: 69 (24 Nov 2021 08:46) (65 - 83)  BP: 122/65 (24 Nov 2021 08:46) (113/70 - 122/65)  BP(mean): --  RR: 14 (24 Nov 2021 08:46) (14 - 18)  SpO2: 97% (24 Nov 2021 08:46) (95% - 97%)    PE:  Stable, comfortable, NAD, alert, pleasant          Dressing: clean/dry/intact, LLE in splint          Sensation: intact to light touch to patient's baseline BLE          Motor exam:  firing ehl/fhl and wiggling toes          Skin warm, well-perfused; capillary refill brisk BLE              LABS:                        12.1   5.26  )-----------( 264      ( 23 Nov 2021 07:35 )             38.4     11-23    137  |  103  |  17  ----------------------------<  106<H>  4.1   |  27  |  0.63    Ca    9.7      23 Nov 2021 07:35      ASSESSMENT AND PLAN: POD 5 left forefoot reconstruction, slow to progress [facility] PT   1. Stable. Refusing NAOMI due to COVID and cleanliness concerns   2. Analgesic pain control  3. DVT prophylaxis: SCDs, ASA 81 daily   4. Weight Bearing Status:   NWB LLE   5. Disposition: Rec for NAOMI; plan to optimize for home as pt refuses NAOMI 
Ortho Note    Pt comfortable without complaints, pain controlled  Denies CP, SOB, N/V, numbness/tingling   Patient still progressing slowly with PT    Vital Signs Last 24 Hrs  T(C): 36.8 (25 Nov 2021 04:41), Max: 36.8 (25 Nov 2021 04:41)  T(F): 98.2 (25 Nov 2021 04:41), Max: 98.2 (25 Nov 2021 04:41)  HR: 81 (25 Nov 2021 04:41) (69 - 81)  BP: 118/66 (25 Nov 2021 04:41) (104/64 - 122/65)  BP(mean): 84 (25 Nov 2021 04:41) (80 - 84)  RR: 16 (25 Nov 2021 04:41) (14 - 17)  SpO2: 95% (25 Nov 2021 04:41) (95% - 97%)    VSS  General: A&Ox3, NAD  LLE: Short leg splint C/D/I  Vascular: Toes WWP; Cap refill < 2 sec  Sensation: SILT in toes  Motor: Motor function intact in toes    Labs:                        12.1   4.72  )-----------( 265      ( 25 Nov 2021 07:42 )             37.8

## 2021-11-28 NOTE — PROGRESS NOTE ADULT - REASON FOR ADMISSION
Left foot pain

## 2021-11-28 NOTE — PROGRESS NOTE ADULT - PROVIDER SPECIALTY LIST ADULT
Orthopedics

## 2021-12-08 DIAGNOSIS — M20.12 HALLUX VALGUS (ACQUIRED), LEFT FOOT: ICD-10-CM

## 2021-12-08 DIAGNOSIS — I77.819 AORTIC ECTASIA, UNSPECIFIED SITE: ICD-10-CM

## 2021-12-08 DIAGNOSIS — R01.1 CARDIAC MURMUR, UNSPECIFIED: ICD-10-CM

## 2021-12-08 DIAGNOSIS — E78.5 HYPERLIPIDEMIA, UNSPECIFIED: ICD-10-CM

## 2021-12-08 DIAGNOSIS — E66.9 OBESITY, UNSPECIFIED: ICD-10-CM

## 2021-12-08 DIAGNOSIS — M20.42 OTHER HAMMER TOE(S) (ACQUIRED), LEFT FOOT: ICD-10-CM

## 2021-12-22 PROCEDURE — 85027 COMPLETE CBC AUTOMATED: CPT

## 2021-12-22 PROCEDURE — 97535 SELF CARE MNGMENT TRAINING: CPT

## 2021-12-22 PROCEDURE — 97530 THERAPEUTIC ACTIVITIES: CPT

## 2021-12-22 PROCEDURE — 76000 FLUOROSCOPY <1 HR PHYS/QHP: CPT

## 2021-12-22 PROCEDURE — 97110 THERAPEUTIC EXERCISES: CPT

## 2021-12-22 PROCEDURE — C1713: CPT

## 2021-12-22 PROCEDURE — 80048 BASIC METABOLIC PNL TOTAL CA: CPT

## 2021-12-22 PROCEDURE — 97116 GAIT TRAINING THERAPY: CPT

## 2021-12-22 PROCEDURE — 36415 COLL VENOUS BLD VENIPUNCTURE: CPT

## 2021-12-22 PROCEDURE — 97161 PT EVAL LOW COMPLEX 20 MIN: CPT

## 2021-12-22 PROCEDURE — C1769: CPT

## 2021-12-22 PROCEDURE — 86769 SARS-COV-2 COVID-19 ANTIBODY: CPT

## 2022-02-23 PROBLEM — Z00.00 ENCOUNTER FOR PREVENTIVE HEALTH EXAMINATION: Noted: 2022-02-23

## 2022-03-01 ENCOUNTER — OUTPATIENT (OUTPATIENT)
Dept: OUTPATIENT SERVICES | Facility: HOSPITAL | Age: 77
LOS: 1 days | End: 2022-03-01
Payer: MEDICARE

## 2022-03-01 ENCOUNTER — APPOINTMENT (OUTPATIENT)
Dept: HEART AND VASCULAR | Facility: CLINIC | Age: 77
End: 2022-03-01

## 2022-03-01 DIAGNOSIS — Z98.89 OTHER SPECIFIED POSTPROCEDURAL STATES: Chronic | ICD-10-CM

## 2022-03-01 DIAGNOSIS — Z41.1 ENCOUNTER FOR COSMETIC SURGERY: Chronic | ICD-10-CM

## 2022-03-01 DIAGNOSIS — Z98.890 OTHER SPECIFIED POSTPROCEDURAL STATES: Chronic | ICD-10-CM

## 2022-03-01 DIAGNOSIS — Z96.659 PRESENCE OF UNSPECIFIED ARTIFICIAL KNEE JOINT: Chronic | ICD-10-CM

## 2022-03-01 PROBLEM — E78.5 HYPERLIPIDEMIA, UNSPECIFIED: Chronic | Status: ACTIVE | Noted: 2021-11-18

## 2022-03-01 PROBLEM — R01.1 CARDIAC MURMUR, UNSPECIFIED: Chronic | Status: ACTIVE | Noted: 2021-11-18

## 2022-03-01 PROBLEM — I77.89 OTHER SPECIFIED DISORDERS OF ARTERIES AND ARTERIOLES: Chronic | Status: ACTIVE | Noted: 2021-11-18

## 2022-03-01 PROCEDURE — 73630 X-RAY EXAM OF FOOT: CPT

## 2022-03-01 PROCEDURE — 73630 X-RAY EXAM OF FOOT: CPT | Mod: 26,LT

## 2022-05-10 ENCOUNTER — APPOINTMENT (OUTPATIENT)
Dept: HEART AND VASCULAR | Facility: CLINIC | Age: 77
End: 2022-05-10

## 2022-05-17 ENCOUNTER — APPOINTMENT (OUTPATIENT)
Dept: HEART AND VASCULAR | Facility: CLINIC | Age: 77
End: 2022-05-17

## 2022-05-25 ENCOUNTER — APPOINTMENT (OUTPATIENT)
Dept: HEART AND VASCULAR | Facility: CLINIC | Age: 77
End: 2022-05-25
Payer: MEDICARE

## 2022-05-25 VITALS
HEIGHT: 62 IN | OXYGEN SATURATION: 97 % | BODY MASS INDEX: 31.28 KG/M2 | HEART RATE: 125 BPM | DIASTOLIC BLOOD PRESSURE: 70 MMHG | WEIGHT: 170 LBS | RESPIRATION RATE: 14 BRPM | SYSTOLIC BLOOD PRESSURE: 120 MMHG | TEMPERATURE: 97 F

## 2022-05-25 PROCEDURE — 93306 TTE W/DOPPLER COMPLETE: CPT

## 2022-05-31 ENCOUNTER — OUTPATIENT (OUTPATIENT)
Dept: OUTPATIENT SERVICES | Facility: HOSPITAL | Age: 77
LOS: 1 days | End: 2022-05-31
Payer: MEDICARE

## 2022-05-31 DIAGNOSIS — Z98.890 OTHER SPECIFIED POSTPROCEDURAL STATES: Chronic | ICD-10-CM

## 2022-05-31 DIAGNOSIS — Z96.659 PRESENCE OF UNSPECIFIED ARTIFICIAL KNEE JOINT: Chronic | ICD-10-CM

## 2022-05-31 DIAGNOSIS — Z98.89 OTHER SPECIFIED POSTPROCEDURAL STATES: Chronic | ICD-10-CM

## 2022-05-31 DIAGNOSIS — Z41.1 ENCOUNTER FOR COSMETIC SURGERY: Chronic | ICD-10-CM

## 2022-05-31 PROCEDURE — 73564 X-RAY EXAM KNEE 4 OR MORE: CPT | Mod: 26,RT

## 2022-05-31 PROCEDURE — 73630 X-RAY EXAM OF FOOT: CPT

## 2022-05-31 PROCEDURE — 73564 X-RAY EXAM KNEE 4 OR MORE: CPT

## 2022-05-31 PROCEDURE — 73630 X-RAY EXAM OF FOOT: CPT | Mod: 26,LT

## 2022-09-13 ENCOUNTER — APPOINTMENT (OUTPATIENT)
Dept: ULTRASOUND IMAGING | Facility: CLINIC | Age: 77
End: 2022-09-13

## 2022-09-13 PROCEDURE — 76830 TRANSVAGINAL US NON-OB: CPT

## 2022-09-13 PROCEDURE — 76856 US EXAM PELVIC COMPLETE: CPT

## 2022-09-22 NOTE — OCCUPATIONAL THERAPY INITIAL EVALUATION ADULT - LEVEL OF INDEPENDENCE: SUPINE/SIT, REHAB EVAL
Patient is requesting a callback form a clinical staff member in regards to her mammogram scheduling. Patient is unsure if she needs to be seen for a mammogram in 6 months and what kind is needed. Please call patient back to further discuss. Patient is okay to hear back from a clinical staff member tomorrow.    independent

## 2022-12-06 ENCOUNTER — APPOINTMENT (OUTPATIENT)
Dept: RADIOLOGY | Facility: CLINIC | Age: 77
End: 2022-12-06

## 2023-01-03 RX ORDER — SPIRONOLACTONE 25 MG/1
25 TABLET ORAL DAILY
Qty: 90 | Refills: 3 | Status: ACTIVE | COMMUNITY
Start: 2021-05-11 | End: 1900-01-01

## 2023-02-22 ENCOUNTER — APPOINTMENT (OUTPATIENT)
Dept: MAMMOGRAPHY | Facility: CLINIC | Age: 78
End: 2023-02-22
Payer: MEDICARE

## 2023-02-22 PROCEDURE — 77063 BREAST TOMOSYNTHESIS BI: CPT

## 2023-02-22 PROCEDURE — 77067 SCR MAMMO BI INCL CAD: CPT

## 2023-06-07 ENCOUNTER — NON-APPOINTMENT (OUTPATIENT)
Age: 78
End: 2023-06-07

## 2023-06-07 ENCOUNTER — APPOINTMENT (OUTPATIENT)
Dept: HEART AND VASCULAR | Facility: CLINIC | Age: 78
End: 2023-06-07
Payer: MEDICARE

## 2023-06-07 VITALS
WEIGHT: 173 LBS | HEIGHT: 62 IN | OXYGEN SATURATION: 97 % | TEMPERATURE: 97.5 F | HEART RATE: 77 BPM | BODY MASS INDEX: 31.83 KG/M2 | SYSTOLIC BLOOD PRESSURE: 124 MMHG | DIASTOLIC BLOOD PRESSURE: 90 MMHG

## 2023-06-07 DIAGNOSIS — I35.1 NONRHEUMATIC AORTIC (VALVE) INSUFFICIENCY: ICD-10-CM

## 2023-06-07 DIAGNOSIS — I77.89 OTHER SPECIFIED DISORDERS OF ARTERIES AND ARTERIOLES: ICD-10-CM

## 2023-06-07 PROCEDURE — ZZZZZ: CPT

## 2023-06-07 PROCEDURE — 93306 TTE W/DOPPLER COMPLETE: CPT

## 2023-08-23 ENCOUNTER — APPOINTMENT (OUTPATIENT)
Dept: CT IMAGING | Facility: CLINIC | Age: 78
End: 2023-08-23
Payer: MEDICARE

## 2023-08-23 PROCEDURE — 74174 CTA ABD&PLVS W/CONTRAST: CPT | Mod: MH

## 2023-10-26 ENCOUNTER — APPOINTMENT (OUTPATIENT)
Dept: MRI IMAGING | Facility: CLINIC | Age: 78
End: 2023-10-26
Payer: MEDICARE

## 2023-10-26 ENCOUNTER — APPOINTMENT (OUTPATIENT)
Dept: ULTRASOUND IMAGING | Facility: CLINIC | Age: 78
End: 2023-10-26
Payer: MEDICARE

## 2023-10-26 PROCEDURE — 72148 MRI LUMBAR SPINE W/O DYE: CPT | Mod: MH

## 2023-10-26 PROCEDURE — 76830 TRANSVAGINAL US NON-OB: CPT

## 2023-12-31 ENCOUNTER — TRANSCRIPTION ENCOUNTER (OUTPATIENT)
Age: 78
End: 2023-12-31

## 2023-12-31 VITALS
RESPIRATION RATE: 22 BRPM | HEART RATE: 98 BPM | SYSTOLIC BLOOD PRESSURE: 158 MMHG | TEMPERATURE: 99 F | OXYGEN SATURATION: 98 % | DIASTOLIC BLOOD PRESSURE: 76 MMHG

## 2023-12-31 PROCEDURE — 99285 EMERGENCY DEPT VISIT HI MDM: CPT

## 2023-12-31 NOTE — ED ADULT TRIAGE NOTE - RESPIRATORY RATE (BREATHS/MIN)
Control has improved with use of insulin pump with closed loop communication system. Will continue current settings.    Plan  - Continue insulin Novolog via Tandem tslim X2 insulin pump    Basal rate  12A: 1.0 U/h    ICR  12A: 10    ISF  12A: 39    Target: 130  IAT: 5h    F/u 4 months with labs   22 Melolabial Interpolation Flap Text: A decision was made to reconstruct the defect utilizing an interpolation axial flap and a staged reconstruction.  A telfa template was made of the defect.  This telfa template was then used to outline the melolabial interpolation flap.  The donor area for the pedicle flap was then injected with anesthesia.  The flap was excised through the skin and subcutaneous tissue down to the layer of the underlying musculature.  The pedicle flap was carefully excised within this deep plane to maintain its blood supply.  The edges of the donor site were undermined.   The donor site was closed in a primary fashion.  The pedicle was then rotated into position and sutured.  Once the tube was sutured into place, adequate blood supply was confirmed with blanching and refill.  The pedicle was then wrapped with xeroform gauze and dressed appropriately with a telfa and gauze bandage to ensure continued blood supply and protect the attached pedicle.

## 2024-01-01 ENCOUNTER — INPATIENT (INPATIENT)
Facility: HOSPITAL | Age: 79
LOS: 6 days | Discharge: NURSING, REHAB OR LONG-TERM CARE | DRG: 481 | End: 2024-01-08
Attending: ORTHOPAEDIC SURGERY | Admitting: ORTHOPAEDIC SURGERY
Payer: MEDICARE

## 2024-01-01 DIAGNOSIS — Z96.659 PRESENCE OF UNSPECIFIED ARTIFICIAL KNEE JOINT: Chronic | ICD-10-CM

## 2024-01-01 DIAGNOSIS — I10 ESSENTIAL (PRIMARY) HYPERTENSION: ICD-10-CM

## 2024-01-01 DIAGNOSIS — E78.5 HYPERLIPIDEMIA, UNSPECIFIED: ICD-10-CM

## 2024-01-01 DIAGNOSIS — Z98.89 OTHER SPECIFIED POSTPROCEDURAL STATES: Chronic | ICD-10-CM

## 2024-01-01 DIAGNOSIS — Z98.890 OTHER SPECIFIED POSTPROCEDURAL STATES: Chronic | ICD-10-CM

## 2024-01-01 DIAGNOSIS — Z41.1 ENCOUNTER FOR COSMETIC SURGERY: Chronic | ICD-10-CM

## 2024-01-01 LAB
A1C WITH ESTIMATED AVERAGE GLUCOSE RESULT: 5.7 % — HIGH (ref 4–5.6)
A1C WITH ESTIMATED AVERAGE GLUCOSE RESULT: 5.7 % — HIGH (ref 4–5.6)
ANION GAP SERPL CALC-SCNC: 12 MMOL/L — SIGNIFICANT CHANGE UP (ref 5–17)
ANION GAP SERPL CALC-SCNC: 12 MMOL/L — SIGNIFICANT CHANGE UP (ref 5–17)
APPEARANCE UR: CLEAR — SIGNIFICANT CHANGE UP
APPEARANCE UR: CLEAR — SIGNIFICANT CHANGE UP
APTT BLD: 30.4 SEC — SIGNIFICANT CHANGE UP (ref 24.5–35.6)
APTT BLD: 30.4 SEC — SIGNIFICANT CHANGE UP (ref 24.5–35.6)
BACTERIA # UR AUTO: NEGATIVE /HPF — SIGNIFICANT CHANGE UP
BACTERIA # UR AUTO: NEGATIVE /HPF — SIGNIFICANT CHANGE UP
BASOPHILS # BLD AUTO: 0.02 K/UL — SIGNIFICANT CHANGE UP (ref 0–0.2)
BASOPHILS # BLD AUTO: 0.02 K/UL — SIGNIFICANT CHANGE UP (ref 0–0.2)
BASOPHILS NFR BLD AUTO: 0.2 % — SIGNIFICANT CHANGE UP (ref 0–2)
BASOPHILS NFR BLD AUTO: 0.2 % — SIGNIFICANT CHANGE UP (ref 0–2)
BILIRUB UR-MCNC: NEGATIVE — SIGNIFICANT CHANGE UP
BILIRUB UR-MCNC: NEGATIVE — SIGNIFICANT CHANGE UP
BLD GP AB SCN SERPL QL: NEGATIVE — SIGNIFICANT CHANGE UP
BLD GP AB SCN SERPL QL: NEGATIVE — SIGNIFICANT CHANGE UP
BUN SERPL-MCNC: 23 MG/DL — SIGNIFICANT CHANGE UP (ref 7–23)
BUN SERPL-MCNC: 23 MG/DL — SIGNIFICANT CHANGE UP (ref 7–23)
CALCIUM SERPL-MCNC: 8.9 MG/DL — SIGNIFICANT CHANGE UP (ref 8.4–10.5)
CALCIUM SERPL-MCNC: 8.9 MG/DL — SIGNIFICANT CHANGE UP (ref 8.4–10.5)
CAST: 0 /LPF — SIGNIFICANT CHANGE UP (ref 0–4)
CAST: 0 /LPF — SIGNIFICANT CHANGE UP (ref 0–4)
CHLORIDE SERPL-SCNC: 106 MMOL/L — SIGNIFICANT CHANGE UP (ref 96–108)
CHLORIDE SERPL-SCNC: 106 MMOL/L — SIGNIFICANT CHANGE UP (ref 96–108)
CO2 SERPL-SCNC: 20 MMOL/L — LOW (ref 22–31)
CO2 SERPL-SCNC: 20 MMOL/L — LOW (ref 22–31)
COLOR SPEC: YELLOW — SIGNIFICANT CHANGE UP
COLOR SPEC: YELLOW — SIGNIFICANT CHANGE UP
CREAT SERPL-MCNC: 0.81 MG/DL — SIGNIFICANT CHANGE UP (ref 0.5–1.3)
CREAT SERPL-MCNC: 0.81 MG/DL — SIGNIFICANT CHANGE UP (ref 0.5–1.3)
DIFF PNL FLD: NEGATIVE — SIGNIFICANT CHANGE UP
DIFF PNL FLD: NEGATIVE — SIGNIFICANT CHANGE UP
EGFR: 73 ML/MIN/1.73M2 — SIGNIFICANT CHANGE UP
EGFR: 73 ML/MIN/1.73M2 — SIGNIFICANT CHANGE UP
EOSINOPHIL # BLD AUTO: 0.11 K/UL — SIGNIFICANT CHANGE UP (ref 0–0.5)
EOSINOPHIL # BLD AUTO: 0.11 K/UL — SIGNIFICANT CHANGE UP (ref 0–0.5)
EOSINOPHIL NFR BLD AUTO: 1.3 % — SIGNIFICANT CHANGE UP (ref 0–6)
EOSINOPHIL NFR BLD AUTO: 1.3 % — SIGNIFICANT CHANGE UP (ref 0–6)
ESTIMATED AVERAGE GLUCOSE: 117 MG/DL — HIGH (ref 68–114)
ESTIMATED AVERAGE GLUCOSE: 117 MG/DL — HIGH (ref 68–114)
GLUCOSE SERPL-MCNC: 181 MG/DL — HIGH (ref 70–99)
GLUCOSE SERPL-MCNC: 181 MG/DL — HIGH (ref 70–99)
GLUCOSE UR QL: NEGATIVE MG/DL — SIGNIFICANT CHANGE UP
GLUCOSE UR QL: NEGATIVE MG/DL — SIGNIFICANT CHANGE UP
HCT VFR BLD CALC: 34.8 % — SIGNIFICANT CHANGE UP (ref 34.5–45)
HCT VFR BLD CALC: 34.8 % — SIGNIFICANT CHANGE UP (ref 34.5–45)
HGB BLD-MCNC: 11.2 G/DL — LOW (ref 11.5–15.5)
HGB BLD-MCNC: 11.2 G/DL — LOW (ref 11.5–15.5)
IMM GRANULOCYTES NFR BLD AUTO: 0.2 % — SIGNIFICANT CHANGE UP (ref 0–0.9)
IMM GRANULOCYTES NFR BLD AUTO: 0.2 % — SIGNIFICANT CHANGE UP (ref 0–0.9)
INR BLD: 1.08 — SIGNIFICANT CHANGE UP (ref 0.85–1.18)
INR BLD: 1.08 — SIGNIFICANT CHANGE UP (ref 0.85–1.18)
KETONES UR-MCNC: NEGATIVE MG/DL — SIGNIFICANT CHANGE UP
KETONES UR-MCNC: NEGATIVE MG/DL — SIGNIFICANT CHANGE UP
LEUKOCYTE ESTERASE UR-ACNC: ABNORMAL
LEUKOCYTE ESTERASE UR-ACNC: ABNORMAL
LYMPHOCYTES # BLD AUTO: 1.36 K/UL — SIGNIFICANT CHANGE UP (ref 1–3.3)
LYMPHOCYTES # BLD AUTO: 1.36 K/UL — SIGNIFICANT CHANGE UP (ref 1–3.3)
LYMPHOCYTES # BLD AUTO: 15.7 % — SIGNIFICANT CHANGE UP (ref 13–44)
LYMPHOCYTES # BLD AUTO: 15.7 % — SIGNIFICANT CHANGE UP (ref 13–44)
MCHC RBC-ENTMCNC: 28.6 PG — SIGNIFICANT CHANGE UP (ref 27–34)
MCHC RBC-ENTMCNC: 28.6 PG — SIGNIFICANT CHANGE UP (ref 27–34)
MCHC RBC-ENTMCNC: 32.2 GM/DL — SIGNIFICANT CHANGE UP (ref 32–36)
MCHC RBC-ENTMCNC: 32.2 GM/DL — SIGNIFICANT CHANGE UP (ref 32–36)
MCV RBC AUTO: 88.8 FL — SIGNIFICANT CHANGE UP (ref 80–100)
MCV RBC AUTO: 88.8 FL — SIGNIFICANT CHANGE UP (ref 80–100)
MONOCYTES # BLD AUTO: 0.49 K/UL — SIGNIFICANT CHANGE UP (ref 0–0.9)
MONOCYTES # BLD AUTO: 0.49 K/UL — SIGNIFICANT CHANGE UP (ref 0–0.9)
MONOCYTES NFR BLD AUTO: 5.6 % — SIGNIFICANT CHANGE UP (ref 2–14)
MONOCYTES NFR BLD AUTO: 5.6 % — SIGNIFICANT CHANGE UP (ref 2–14)
NEUTROPHILS # BLD AUTO: 6.68 K/UL — SIGNIFICANT CHANGE UP (ref 1.8–7.4)
NEUTROPHILS # BLD AUTO: 6.68 K/UL — SIGNIFICANT CHANGE UP (ref 1.8–7.4)
NEUTROPHILS NFR BLD AUTO: 77 % — SIGNIFICANT CHANGE UP (ref 43–77)
NEUTROPHILS NFR BLD AUTO: 77 % — SIGNIFICANT CHANGE UP (ref 43–77)
NITRITE UR-MCNC: NEGATIVE — SIGNIFICANT CHANGE UP
NITRITE UR-MCNC: NEGATIVE — SIGNIFICANT CHANGE UP
NRBC # BLD: 0 /100 WBCS — SIGNIFICANT CHANGE UP (ref 0–0)
NRBC # BLD: 0 /100 WBCS — SIGNIFICANT CHANGE UP (ref 0–0)
PH UR: 5.5 — SIGNIFICANT CHANGE UP (ref 5–8)
PH UR: 5.5 — SIGNIFICANT CHANGE UP (ref 5–8)
PLATELET # BLD AUTO: 227 K/UL — SIGNIFICANT CHANGE UP (ref 150–400)
PLATELET # BLD AUTO: 227 K/UL — SIGNIFICANT CHANGE UP (ref 150–400)
POTASSIUM SERPL-MCNC: 3.6 MMOL/L — SIGNIFICANT CHANGE UP (ref 3.5–5.3)
POTASSIUM SERPL-MCNC: 3.6 MMOL/L — SIGNIFICANT CHANGE UP (ref 3.5–5.3)
POTASSIUM SERPL-SCNC: 3.6 MMOL/L — SIGNIFICANT CHANGE UP (ref 3.5–5.3)
POTASSIUM SERPL-SCNC: 3.6 MMOL/L — SIGNIFICANT CHANGE UP (ref 3.5–5.3)
PROT UR-MCNC: NEGATIVE MG/DL — SIGNIFICANT CHANGE UP
PROT UR-MCNC: NEGATIVE MG/DL — SIGNIFICANT CHANGE UP
PROTHROM AB SERPL-ACNC: 12.3 SEC — SIGNIFICANT CHANGE UP (ref 9.5–13)
PROTHROM AB SERPL-ACNC: 12.3 SEC — SIGNIFICANT CHANGE UP (ref 9.5–13)
RBC # BLD: 3.92 M/UL — SIGNIFICANT CHANGE UP (ref 3.8–5.2)
RBC # BLD: 3.92 M/UL — SIGNIFICANT CHANGE UP (ref 3.8–5.2)
RBC # FLD: 13.8 % — SIGNIFICANT CHANGE UP (ref 10.3–14.5)
RBC # FLD: 13.8 % — SIGNIFICANT CHANGE UP (ref 10.3–14.5)
RBC CASTS # UR COMP ASSIST: 1 /HPF — SIGNIFICANT CHANGE UP (ref 0–4)
RBC CASTS # UR COMP ASSIST: 1 /HPF — SIGNIFICANT CHANGE UP (ref 0–4)
RH IG SCN BLD-IMP: POSITIVE — SIGNIFICANT CHANGE UP
SODIUM SERPL-SCNC: 138 MMOL/L — SIGNIFICANT CHANGE UP (ref 135–145)
SODIUM SERPL-SCNC: 138 MMOL/L — SIGNIFICANT CHANGE UP (ref 135–145)
SP GR SPEC: 1.02 — SIGNIFICANT CHANGE UP (ref 1–1.03)
SP GR SPEC: 1.02 — SIGNIFICANT CHANGE UP (ref 1–1.03)
SQUAMOUS # UR AUTO: 1 /HPF — SIGNIFICANT CHANGE UP (ref 0–5)
SQUAMOUS # UR AUTO: 1 /HPF — SIGNIFICANT CHANGE UP (ref 0–5)
UROBILINOGEN FLD QL: 0.2 MG/DL — SIGNIFICANT CHANGE UP (ref 0.2–1)
UROBILINOGEN FLD QL: 0.2 MG/DL — SIGNIFICANT CHANGE UP (ref 0.2–1)
WBC # BLD: 8.68 K/UL — SIGNIFICANT CHANGE UP (ref 3.8–10.5)
WBC # BLD: 8.68 K/UL — SIGNIFICANT CHANGE UP (ref 3.8–10.5)
WBC # FLD AUTO: 8.68 K/UL — SIGNIFICANT CHANGE UP (ref 3.8–10.5)
WBC # FLD AUTO: 8.68 K/UL — SIGNIFICANT CHANGE UP (ref 3.8–10.5)
WBC UR QL: 1 /HPF — SIGNIFICANT CHANGE UP (ref 0–5)
WBC UR QL: 1 /HPF — SIGNIFICANT CHANGE UP (ref 0–5)

## 2024-01-01 PROCEDURE — 73560 X-RAY EXAM OF KNEE 1 OR 2: CPT | Mod: 26,RT

## 2024-01-01 PROCEDURE — 73551 X-RAY EXAM OF FEMUR 1: CPT | Mod: 26,RT

## 2024-01-01 PROCEDURE — 73501 X-RAY EXAM HIP UNI 1 VIEW: CPT | Mod: 26,RT

## 2024-01-01 PROCEDURE — 71045 X-RAY EXAM CHEST 1 VIEW: CPT | Mod: 26

## 2024-01-01 PROCEDURE — 73030 X-RAY EXAM OF SHOULDER: CPT | Mod: 26,RT

## 2024-01-01 PROCEDURE — 70450 CT HEAD/BRAIN W/O DYE: CPT | Mod: 26,MA

## 2024-01-01 PROCEDURE — 93010 ELECTROCARDIOGRAM REPORT: CPT

## 2024-01-01 PROCEDURE — 73552 X-RAY EXAM OF FEMUR 2/>: CPT | Mod: 26,RT

## 2024-01-01 PROCEDURE — 73700 CT LOWER EXTREMITY W/O DYE: CPT | Mod: 26,RT,MA

## 2024-01-01 PROCEDURE — 99223 1ST HOSP IP/OBS HIGH 75: CPT

## 2024-01-01 DEVICE — GUIDEWIRE BALL TIP 3MM X 1000MM FOR T2 R1.5 FEMORAL NAILING SYSTEM: Type: IMPLANTABLE DEVICE | Site: RIGHT | Status: FUNCTIONAL

## 2024-01-01 DEVICE — K-WIRE STRYKER 3MM X 285MM: Type: IMPLANTABLE DEVICE | Site: RIGHT | Status: FUNCTIONAL

## 2024-01-01 DEVICE — SCREW ALPHA LOKG 5X75MM STRL: Type: IMPLANTABLE DEVICE | Site: RIGHT | Status: FUNCTIONAL

## 2024-01-01 DEVICE — SCREW LOKG 5X65MM: Type: IMPLANTABLE DEVICE | Site: RIGHT | Status: FUNCTIONAL

## 2024-01-01 DEVICE — IMPLANTABLE DEVICE: Type: IMPLANTABLE DEVICE | Site: RIGHT | Status: FUNCTIONAL

## 2024-01-01 DEVICE — SCREW LOKG 5X37.5MM: Type: IMPLANTABLE DEVICE | Site: RIGHT | Status: FUNCTIONAL

## 2024-01-01 DEVICE — SCREW ALPHA LOKG 5X60MM STRL: Type: IMPLANTABLE DEVICE | Site: RIGHT | Status: FUNCTIONAL

## 2024-01-01 RX ORDER — FAMOTIDINE 10 MG/ML
20 INJECTION INTRAVENOUS
Refills: 0 | Status: DISCONTINUED | OUTPATIENT
Start: 2024-01-01 | End: 2024-01-08

## 2024-01-01 RX ORDER — CALCIUM CARBONATE 500(1250)
1 TABLET ORAL ONCE
Refills: 0 | Status: COMPLETED | OUTPATIENT
Start: 2024-01-01 | End: 2024-01-02

## 2024-01-01 RX ORDER — OXYCODONE HYDROCHLORIDE 5 MG/1
5 TABLET ORAL EVERY 4 HOURS
Refills: 0 | Status: DISCONTINUED | OUTPATIENT
Start: 2024-01-01 | End: 2024-01-05

## 2024-01-01 RX ORDER — SENNA PLUS 8.6 MG/1
2 TABLET ORAL AT BEDTIME
Refills: 0 | Status: DISCONTINUED | OUTPATIENT
Start: 2024-01-01 | End: 2024-01-08

## 2024-01-01 RX ORDER — MAGNESIUM HYDROXIDE 400 MG/1
30 TABLET, CHEWABLE ORAL DAILY
Refills: 0 | Status: DISCONTINUED | OUTPATIENT
Start: 2024-01-01 | End: 2024-01-08

## 2024-01-01 RX ORDER — ACETAMINOPHEN 500 MG
1000 TABLET ORAL EVERY 8 HOURS
Refills: 0 | Status: DISCONTINUED | OUTPATIENT
Start: 2024-01-01 | End: 2024-01-08

## 2024-01-01 RX ORDER — CHLORHEXIDINE GLUCONATE 213 G/1000ML
1 SOLUTION TOPICAL
Refills: 0 | Status: COMPLETED | OUTPATIENT
Start: 2024-01-01 | End: 2024-01-04

## 2024-01-01 RX ORDER — OXYCODONE HYDROCHLORIDE 5 MG/1
5 TABLET ORAL EVERY 4 HOURS
Refills: 0 | Status: DISCONTINUED | OUTPATIENT
Start: 2024-01-01 | End: 2024-01-01

## 2024-01-01 RX ORDER — ATORVASTATIN CALCIUM 80 MG/1
10 TABLET, FILM COATED ORAL AT BEDTIME
Refills: 0 | Status: DISCONTINUED | OUTPATIENT
Start: 2024-01-01 | End: 2024-01-08

## 2024-01-01 RX ORDER — OXYCODONE HYDROCHLORIDE 5 MG/1
10 TABLET ORAL EVERY 4 HOURS
Refills: 0 | Status: DISCONTINUED | OUTPATIENT
Start: 2024-01-01 | End: 2024-01-05

## 2024-01-01 RX ORDER — POLYETHYLENE GLYCOL 3350 17 G/17G
17 POWDER, FOR SOLUTION ORAL AT BEDTIME
Refills: 0 | Status: DISCONTINUED | OUTPATIENT
Start: 2024-01-01 | End: 2024-01-08

## 2024-01-01 RX ORDER — ACETAMINOPHEN 500 MG
650 TABLET ORAL EVERY 6 HOURS
Refills: 0 | Status: DISCONTINUED | OUTPATIENT
Start: 2024-01-01 | End: 2024-01-02

## 2024-01-01 RX ORDER — ONDANSETRON 8 MG/1
4 TABLET, FILM COATED ORAL EVERY 6 HOURS
Refills: 0 | Status: DISCONTINUED | OUTPATIENT
Start: 2024-01-01 | End: 2024-01-08

## 2024-01-01 RX ORDER — MAGNESIUM HYDROXIDE 400 MG/1
30 TABLET, CHEWABLE ORAL DAILY
Refills: 0 | Status: DISCONTINUED | OUTPATIENT
Start: 2024-01-01 | End: 2024-01-06

## 2024-01-01 RX ORDER — HYDROMORPHONE HYDROCHLORIDE 2 MG/ML
0.5 INJECTION INTRAMUSCULAR; INTRAVENOUS; SUBCUTANEOUS
Refills: 0 | Status: DISCONTINUED | OUTPATIENT
Start: 2024-01-01 | End: 2024-01-01

## 2024-01-01 RX ORDER — ASPIRIN/CALCIUM CARB/MAGNESIUM 324 MG
81 TABLET ORAL
Refills: 0 | Status: DISCONTINUED | OUTPATIENT
Start: 2024-01-02 | End: 2024-01-08

## 2024-01-01 RX ORDER — CEFAZOLIN SODIUM 1 G
2000 VIAL (EA) INJECTION EVERY 8 HOURS
Refills: 0 | Status: COMPLETED | OUTPATIENT
Start: 2024-01-01 | End: 2024-01-02

## 2024-01-01 RX ORDER — HYDROMORPHONE HYDROCHLORIDE 2 MG/ML
0.5 INJECTION INTRAMUSCULAR; INTRAVENOUS; SUBCUTANEOUS EVERY 6 HOURS
Refills: 0 | Status: DISCONTINUED | OUTPATIENT
Start: 2024-01-01 | End: 2024-01-08

## 2024-01-01 RX ORDER — POVIDONE-IODINE 5 %
1 AEROSOL (ML) TOPICAL ONCE
Refills: 0 | Status: DISCONTINUED | OUTPATIENT
Start: 2024-01-01 | End: 2024-01-08

## 2024-01-01 RX ORDER — OXYCODONE HYDROCHLORIDE 5 MG/1
10 TABLET ORAL EVERY 4 HOURS
Refills: 0 | Status: DISCONTINUED | OUTPATIENT
Start: 2024-01-01 | End: 2024-01-01

## 2024-01-01 RX ORDER — ACETAMINOPHEN 500 MG
650 TABLET ORAL ONCE
Refills: 0 | Status: COMPLETED | OUTPATIENT
Start: 2024-01-01 | End: 2024-01-01

## 2024-01-01 RX ORDER — SODIUM CHLORIDE 9 MG/ML
1000 INJECTION INTRAMUSCULAR; INTRAVENOUS; SUBCUTANEOUS
Refills: 0 | Status: DISCONTINUED | OUTPATIENT
Start: 2024-01-01 | End: 2024-01-06

## 2024-01-01 RX ORDER — MORPHINE SULFATE 50 MG/1
2 CAPSULE, EXTENDED RELEASE ORAL ONCE
Refills: 0 | Status: DISCONTINUED | OUTPATIENT
Start: 2024-01-01 | End: 2024-01-01

## 2024-01-01 RX ORDER — SODIUM CHLORIDE 9 MG/ML
1000 INJECTION, SOLUTION INTRAVENOUS
Refills: 0 | Status: DISCONTINUED | OUTPATIENT
Start: 2024-01-01 | End: 2024-01-06

## 2024-01-01 RX ADMIN — Medication 650 MILLIGRAM(S): at 18:52

## 2024-01-01 RX ADMIN — HYDROMORPHONE HYDROCHLORIDE 0.5 MILLIGRAM(S): 2 INJECTION INTRAMUSCULAR; INTRAVENOUS; SUBCUTANEOUS at 14:00

## 2024-01-01 RX ADMIN — MORPHINE SULFATE 2 MILLIGRAM(S): 50 CAPSULE, EXTENDED RELEASE ORAL at 00:25

## 2024-01-01 RX ADMIN — HYDROMORPHONE HYDROCHLORIDE 0.5 MILLIGRAM(S): 2 INJECTION INTRAMUSCULAR; INTRAVENOUS; SUBCUTANEOUS at 13:54

## 2024-01-01 RX ADMIN — SODIUM CHLORIDE 100 MILLILITER(S): 9 INJECTION, SOLUTION INTRAVENOUS at 14:03

## 2024-01-01 RX ADMIN — CHLORHEXIDINE GLUCONATE 1 APPLICATION(S): 213 SOLUTION TOPICAL at 07:30

## 2024-01-01 RX ADMIN — ATORVASTATIN CALCIUM 10 MILLIGRAM(S): 80 TABLET, FILM COATED ORAL at 22:25

## 2024-01-01 RX ADMIN — Medication 1000 MILLIGRAM(S): at 07:46

## 2024-01-01 RX ADMIN — Medication 100 MILLIGRAM(S): at 18:28

## 2024-01-01 RX ADMIN — Medication 650 MILLIGRAM(S): at 00:25

## 2024-01-01 RX ADMIN — Medication 1000 MILLIGRAM(S): at 06:46

## 2024-01-01 RX ADMIN — FAMOTIDINE 40 MILLIGRAM(S): 10 INJECTION INTRAVENOUS at 17:51

## 2024-01-01 RX ADMIN — SODIUM CHLORIDE 125 MILLILITER(S): 9 INJECTION INTRAMUSCULAR; INTRAVENOUS; SUBCUTANEOUS at 02:16

## 2024-01-01 RX ADMIN — ATORVASTATIN CALCIUM 10 MILLIGRAM(S): 80 TABLET, FILM COATED ORAL at 02:20

## 2024-01-01 RX ADMIN — HYDROMORPHONE HYDROCHLORIDE 0.5 MILLIGRAM(S): 2 INJECTION INTRAMUSCULAR; INTRAVENOUS; SUBCUTANEOUS at 03:30

## 2024-01-01 NOTE — PROGRESS NOTE ADULT - SUBJECTIVE AND OBJECTIVE BOX
Ortho Post Op Check    Procedure:  R femur retrograde nail   Surgeon: Dr. LINDA Jeffrey    Pt comfortable without complaints, pain controlled  Denies CP, SOB, N/V, numbness/tingling     Vital Signs Last 24 Hrs  T(C): 34.7 (01-01-24 @ 15:38), Max: 34.7 (01-01-24 @ 15:38)  T(F): 94.5 (01-01-24 @ 15:38), Max: 94.5 (01-01-24 @ 15:38)  HR: 74 (01-01-24 @ 15:38) (74 - 74)  BP: 127/63 (01-01-24 @ 15:38) (127/63 - 127/63)  BP(mean): --  RR: 17 (01-01-24 @ 15:38) (17 - 17)  SpO2: 100% (01-01-24 @ 15:38) (100% - 100%)    General: Pt Alert and oriented, NAD  R leg dsg c/d/i, in KI   Pulses: 2+ DP  Sensation: SILT grossly SPN/DPN/Saph/Lizeth/Tib  Motor: 5/5 TA/GS/EHL,    A/P: 78yFemale s/p R femur retrograde nail by Dr. LINDA Jeffrey on 01-01  - Stable  - holding home spironolactone periop   - Pain Control  - DVT ppx: ASA   - Post op abx: Ancef  - WBS: WBAT  - PT eval     Ortho Pager [phone] Ortho Post Op Check    Procedure:  R femur retrograde nail   Surgeon: Dr. LINDA Jeffrey    Pt comfortable without complaints, pain controlled  Denies CP, SOB, N/V, numbness/tingling     Vital Signs Last 24 Hrs  T(C): 34.7 (01-01-24 @ 15:38), Max: 34.7 (01-01-24 @ 15:38)  T(F): 94.5 (01-01-24 @ 15:38), Max: 94.5 (01-01-24 @ 15:38)  HR: 74 (01-01-24 @ 15:38) (74 - 74)  BP: 127/63 (01-01-24 @ 15:38) (127/63 - 127/63)  BP(mean): --  RR: 17 (01-01-24 @ 15:38) (17 - 17)  SpO2: 100% (01-01-24 @ 15:38) (100% - 100%)    General: Pt Alert and oriented, NAD  R leg dsg c/d/i, in KI   Pulses: 2+ DP  Sensation: SILT grossly SPN/DPN/Saph/Lizeth/Tib  Motor: 5/5 TA/GS/EHL,    A/P: 78yFemale s/p R femur retrograde nail by Dr. LINDA Jeffrey on 01-01  - Stable  - holding home spironolactone periop   - Pain Control  - DVT ppx: ASA   - Post op abx: Ancef  - WBS: WBAT  - PT eval     Ortho Pager [phone]

## 2024-01-01 NOTE — ED ADULT NURSE NOTE - OBJECTIVE STATEMENT
78yF presents to ED with R leg injury s/p slip and fall in her kitchen. Pt rates pain 10/10. Denies HS or AC use. Pt is AAOx4. Ambulates by self at baseline.

## 2024-01-01 NOTE — ED ADULT NURSE NOTE - IS THE PATIENT ABLE TO BE SCREENED?
Implemented All Universal Safety Interventions:  [place] to call system. Call bell, personal items and telephone within reach. Instruct patient to call for assistance. Room bathroom lighting operational. Non-slip footwear when patient is off stretcher. Physically safe environment: no spills, clutter or unnecessary equipment. Stretcher in lowest position, wheels locked, appropriate side rails in place. Yes

## 2024-01-01 NOTE — H&P ADULT - HISTORY OF PRESENT ILLNESS
78y Female PSH L TKA 2016 w/ KECIA Allen forfoot reconstruction Dr. Boswell 2021, presents with RIGHT // distal thigh //s/p mechanical fall. Denies numbness/tingling in the affected extremity. Endorses head strike/ no LOC/other orthopedic injuries at this time. Patient ambulates without assistance  at baseline. Patient does not take any anticoagulation/antiplatelet medications    Inability to walk after injury, immediate pain    PAST MEDICAL & SURGICAL HISTORY:  Dyslipidemia      Enlarged aorta      Heart murmur      H/O rhinoplasty      S/P bunionectomy      H/O arthroscopy of left knee      H/O foot surgery      H/O total knee replacement        Home Medications:  aspirin 81 mg oral delayed release tablet: 1 tab(s) orally once a day (20 Nov 2021 08:29)  Lipitor 10 mg oral tablet: 1 tab(s) orally once a day (19 Nov 2021 06:21)  spironolactone 25 mg oral tablet: 1 tab(s) orally 2 times a day (19 Nov 2021 06:21)  Valtrex 500 mg oral tablet: 1 tab(s) orally once a day, As Needed (19 Nov 2021 06:21)    Allergies    No Known Allergies    Intolerances                              11.2   8.68  )-----------( 227      ( 01 Jan 2024 00:32 )             34.8     01-01    138  |  106  |  23  ----------------------------<  181<H>  3.6   |  20<L>  |  0.81    Ca    8.9      01 Jan 2024 00:32      PT/INR - ( 01 Jan 2024 00:32 )   PT: 12.3 sec;   INR: 1.08          PTT - ( 01 Jan 2024 00:32 )  PTT:30.4 sec  Urinalysis Basic - ( 01 Jan 2024 00:32 )    Color: x / Appearance: x / SG: x / pH: x  Gluc: 181 mg/dL / Ketone: x  / Bili: x / Urobili: x   Blood: x / Protein: x / Nitrite: x   Leuk Esterase: x / RBC: x / WBC x   Sq Epi: x / Non Sq Epi: x / Bacteria: x          Vital Signs Last 24 Hrs  T(C): 37.1 (31 Dec 2023 23:29), Max: 37.1 (31 Dec 2023 23:29)  T(F): 98.7 (31 Dec 2023 23:29), Max: 98.7 (31 Dec 2023 23:29)  HR: 98 (31 Dec 2023 23:29) (98 - 98)  BP: 158/76 (31 Dec 2023 23:29) (158/76 - 158/76)  BP(mean): --  RR: 22 (31 Dec 2023 23:29) (22 - 22)  SpO2: 98% (31 Dec 2023 23:29) (98% - 98%)    Parameters below as of 31 Dec 2023 23:29  Patient On (Oxygen Delivery Method): room air        PHYSICAL EXAM  General: NAD, Awake and Alert    RIGHT // Lower Extremity:   Skin intact   - Ecchymosis of the thigh  + Swelling of the thigh  + Gross deformity of the distal thigh  TTP over the distal thigh // knee  NTTP over the bony prominences of the hip/ankle/foot/toes  L2-S1 SILT  +EHL/FHL/TA/GSC  +DP pulses  Calf nontender  Compartments soft and compressible      .secondary survey negative for additional injury       IMAGING:  XR RIGHT //  Hip: No evidence of other fracture or dislocation  XR RIGHT //Femur: Distal femur fracture  XR RIGHT // Knee: pending   Pending CT femur for intra-articular extension       78y Female PSH L TKA 2016 w/ [facility] Allen forfoot reconstruction Dr. Boswell 2021, presents with RIGHT // distal thigh //s/p mechanical fall. Denies numbness/tingling in the affected extremity. Endorses head strike/ no LOC/other orthopedic injuries at this time. Patient ambulates without assistance  at baseline. Patient does not take any anticoagulation/antiplatelet medications    Inability to walk after injury, immediate pain    PAST MEDICAL & SURGICAL HISTORY:  Dyslipidemia      Enlarged aorta      Heart murmur      H/O rhinoplasty      S/P bunionectomy      H/O arthroscopy of left knee      H/O foot surgery      H/O total knee replacement        Home Medications:  aspirin 81 mg oral delayed release tablet: 1 tab(s) orally once a day (20 Nov 2021 08:29)  Lipitor 10 mg oral tablet: 1 tab(s) orally once a day (19 Nov 2021 06:21)  spironolactone 25 mg oral tablet: 1 tab(s) orally 2 times a day (19 Nov 2021 06:21)  Valtrex 500 mg oral tablet: 1 tab(s) orally once a day, As Needed (19 Nov 2021 06:21)    Allergies    No Known Allergies    Intolerances                              11.2   8.68  )-----------( 227      ( 01 Jan 2024 00:32 )             34.8     01-01    138  |  106  |  23  ----------------------------<  181<H>  3.6   |  20<L>  |  0.81    Ca    8.9      01 Jan 2024 00:32      PT/INR - ( 01 Jan 2024 00:32 )   PT: 12.3 sec;   INR: 1.08          PTT - ( 01 Jan 2024 00:32 )  PTT:30.4 sec  Urinalysis Basic - ( 01 Jan 2024 00:32 )    Color: x / Appearance: x / SG: x / pH: x  Gluc: 181 mg/dL / Ketone: x  / Bili: x / Urobili: x   Blood: x / Protein: x / Nitrite: x   Leuk Esterase: x / RBC: x / WBC x   Sq Epi: x / Non Sq Epi: x / Bacteria: x          Vital Signs Last 24 Hrs  T(C): 37.1 (31 Dec 2023 23:29), Max: 37.1 (31 Dec 2023 23:29)  T(F): 98.7 (31 Dec 2023 23:29), Max: 98.7 (31 Dec 2023 23:29)  HR: 98 (31 Dec 2023 23:29) (98 - 98)  BP: 158/76 (31 Dec 2023 23:29) (158/76 - 158/76)  BP(mean): --  RR: 22 (31 Dec 2023 23:29) (22 - 22)  SpO2: 98% (31 Dec 2023 23:29) (98% - 98%)    Parameters below as of 31 Dec 2023 23:29  Patient On (Oxygen Delivery Method): room air        PHYSICAL EXAM  General: NAD, Awake and Alert    RIGHT // Lower Extremity:   Skin intact   - Ecchymosis of the thigh  + Swelling of the thigh  + Gross deformity of the distal thigh  TTP over the distal thigh // knee  NTTP over the bony prominences of the hip/ankle/foot/toes  L2-S1 SILT  +EHL/FHL/TA/GSC  +DP pulses  Calf nontender  Compartments soft and compressible  Grade II bilateral symmetrical pitting edema  VICKY RLE >0.9    .secondary survey endorses R shoulder soreness with tenderness palpation GT        IMAGING:  XR RIGHT //  Hip: No evidence of other fracture or dislocation  XR RIGHT //Femur: Distal femur fracture  XR RIGHT // Knee : pending  CT femur: knee OA, no intra-articular extension  xray R shoulder: pending     78y Female PSH L TKA 2016 w/ KECIA Allen forfoot reconstruction Dr. Boswell 2021, presents with RIGHT // distal thigh //s/p mechanical fall. Denies numbness/tingling in the affected extremity. Endorses head strike/ no LOC/other orthopedic injuries at this time. Patient ambulates without assistance  at baseline. Patient does not take any anticoagulation/antiplatelet medications    Inability to walk after injury, immediate pain    PAST MEDICAL & SURGICAL HISTORY:  Dyslipidemia      Enlarged aorta      Heart murmur      H/O rhinoplasty      S/P bunionectomy      H/O arthroscopy of left knee      H/O foot surgery      H/O total knee replacement        Home Medications:  aspirin 81 mg oral delayed release tablet: 1 tab(s) orally once a day (20 Nov 2021 08:29)  Lipitor 10 mg oral tablet: 1 tab(s) orally once a day (19 Nov 2021 06:21)  spironolactone 25 mg oral tablet: 1 tab(s) orally 2 times a day (19 Nov 2021 06:21)  Valtrex 500 mg oral tablet: 1 tab(s) orally once a day, As Needed (19 Nov 2021 06:21)    Allergies    No Known Allergies    Intolerances                              11.2   8.68  )-----------( 227      ( 01 Jan 2024 00:32 )             34.8     01-01    138  |  106  |  23  ----------------------------<  181<H>  3.6   |  20<L>  |  0.81    Ca    8.9      01 Jan 2024 00:32      PT/INR - ( 01 Jan 2024 00:32 )   PT: 12.3 sec;   INR: 1.08          PTT - ( 01 Jan 2024 00:32 )  PTT:30.4 sec  Urinalysis Basic - ( 01 Jan 2024 00:32 )    Color: x / Appearance: x / SG: x / pH: x  Gluc: 181 mg/dL / Ketone: x  / Bili: x / Urobili: x   Blood: x / Protein: x / Nitrite: x   Leuk Esterase: x / RBC: x / WBC x   Sq Epi: x / Non Sq Epi: x / Bacteria: x          Vital Signs Last 24 Hrs  T(C): 37.1 (31 Dec 2023 23:29), Max: 37.1 (31 Dec 2023 23:29)  T(F): 98.7 (31 Dec 2023 23:29), Max: 98.7 (31 Dec 2023 23:29)  HR: 98 (31 Dec 2023 23:29) (98 - 98)  BP: 158/76 (31 Dec 2023 23:29) (158/76 - 158/76)  BP(mean): --  RR: 22 (31 Dec 2023 23:29) (22 - 22)  SpO2: 98% (31 Dec 2023 23:29) (98% - 98%)    Parameters below as of 31 Dec 2023 23:29  Patient On (Oxygen Delivery Method): room air        PHYSICAL EXAM  General: NAD, Awake and Alert    RIGHT // Lower Extremity:   Skin intact   - Ecchymosis of the thigh  + Swelling of the thigh  + Gross deformity of the distal thigh  TTP over the distal thigh // knee  NTTP over the bony prominences of the hip/ankle/foot/toes  L2-S1 SILT  +EHL/FHL/TA/GSC  +DP pulses  Calf nontender  Compartments soft and compressible  Grade II bilateral symmetrical pitting edema  VICKY RLE >0.9    .secondary survey endorses R shoulder soreness with tenderness palpation GT        IMAGING:  XR RIGHT //  Hip: No evidence of other fracture or dislocation  XR RIGHT //Femur: Distal femur fracture  XR RIGHT // Knee : pending  CT femur: knee OA, no intra-articular extension  xray R shoulder: no fx or dislocation

## 2024-01-01 NOTE — ED ADULT NURSE REASSESSMENT NOTE - NS ED NURSE REASSESS COMMENT FT1
ortho at bedside applying knee immobilizer, pt in significant pain. giving 0.5 mg of ordered dilaudid for breakthrough pain

## 2024-01-01 NOTE — CONSULT NOTE ADULT - ASSESSMENT
78y Female w/ PMHx HLD, and LE edema (on spironolactone), PSH L TKA 2016 w/ Dr. Posadas L forfoot reconstruction Dr. Boswell 2021, presents after fall, found to have right distal femur fracture, admitted to orthopedic surgery. Medicine consulted for pre-op evaluation for ORIF R distal femur.    #Pre-op evaluation  METS >4. No significant pulmonary history. Denies cardiac hx, per chart review had workup for LE swelling w/ cardiology- recent Echo normal LV function, EF 59% (5/2022). No adverse reactions to anesthesia in the past in self or first-degree relatives.    - EKG reviewed NSR   - CXR w/ increased vascular markings, cardiomegaly   - Prior echo: Echo 5/2022: Normal right and left ventricular systolic function. Left ventricular ejection fraction is 59 % by Rivera's rule (biplane). Normal LV diastolic function.  Minimal to mild aortic and mitral regurgitation.   - RCRI 0 points (Class I Risk) ~ 3.9% for 30d risk of death, MI, or cardiac arrest.  - Adame score 0.2% for risk of MI or cardiac arrest, intraoperatively or up to 30d post-op.   - Low-risk for intermediate-risk procedure. 78y Female w/ PMHx HLD, and LE edema (on spironolactone), PSH L TKA 2016 w/ Dr. Posadas L forfoot reconstruction Dr. Boswell 2021, presents after fall, found to have right distal femur fracture, admitted to orthopedic surgery. Medicine consulted for pre-op evaluation for ORIF R distal femur.    #Pre-op evaluation  METS >4. No significant pulmonary history. Denies cardiac hx, per chart review had workup for LE swelling w/ cardiology- recent Echo normal LV function, EF 59% (5/2022). No adverse reactions to anesthesia in the past in self or first-degree relatives.    - EKG reviewed NSR   - CXR w/ increased vascular markings, cardiomegaly   - Prior echo: Echo 5/2022: Normal right and left ventricular systolic function. Left ventricular ejection fraction is 59 % by Rivera's rule (biplane). Normal LV diastolic function.  Minimal to mild aortic and mitral regurgitation.   - RCRI 0 points (Class I Risk) ~ 3.9% for 30d risk of death, MI, or cardiac arrest.  - Adame score 0.2% for risk of MI or cardiac arrest, intraoperatively or up to 30d post-op.   - Low-risk for intermediate-risk procedure.  - Can HOLD spironolactone prior to surgery, and then re-start as tolerated after

## 2024-01-01 NOTE — ED ADULT NURSE NOTE - NSFALLUNIVINTERV_ED_ALL_ED
Bed/Stretcher in lowest position, wheels locked, appropriate side rails in place/Call bell, personal items and telephone in reach/Instruct patient to call for assistance before getting out of bed/chair/stretcher/Non-slip footwear applied when patient is off stretcher/[place] to call system/Physically safe environment - no spills, clutter or unnecessary equipment/Purposeful proactive rounding/Room/bathroom lighting operational, light cord in reach Bed/Stretcher in lowest position, wheels locked, appropriate side rails in place/Call bell, personal items and telephone in reach/Instruct patient to call for assistance before getting out of bed/chair/stretcher/Non-slip footwear applied when patient is off stretcher/[place] to call system/Physically safe environment - no spills, clutter or unnecessary equipment/Purposeful proactive rounding/Room/bathroom lighting operational, light cord in reach

## 2024-01-01 NOTE — ED PROVIDER NOTE - CLINICAL SUMMARY MEDICAL DECISION MAKING FREE TEXT BOX
Concern for R femur fracture, low suspicion for other traumatic injuries but will get imaging to r/o as pt w/ distracting injury from femur  plan for CTH, cxr, pelvic xray, hip and femur xray  preop labs, pain control, anticipate admission

## 2024-01-01 NOTE — H&P ADULT - ASSESSMENT
Assessment/Plan:  78y F PSH L forfoot reconstruction (Andreia 2021), L TKA ([place] 2016) presenting w/ R distal femur fx  -Pain control as needed  -Bedrest, NWB RIGHT // lower extremity in bulky snider knee immobilizer  -Plan for ORIF versus IMN of R distal femur pending CT femur and xray R knee  -NPO, IVF while NPO  -FU preop labs  -VTE ppx: Please hold all chemical dvt ppx for OR   -Needs medical optimization for OR  -Needs documentation of medical clearance  FINAL PLAN PENDING DISCUSSION WITH ATTENDING   Assessment/Plan:  78y F PSH L forfoot reconstruction (Andreia 2021), L TKA ([place] 2016) presenting w/ R distal femur fx w/ no intra-articular extension  -Pain control as needed  -Bedrest, NWB RIGHT // lower extremity in bulky snider knee immobilizer  -Plan for ORIF  R distal femur   -NPO, IVF while NPO  -FU preop labs  -VTE ppx: Please hold all chemical dvt ppx for OR   -Needs medical optimization for OR  -Needs documentation of medical clearance

## 2024-01-01 NOTE — CONSULT NOTE ADULT - ATTENDING COMMENTS
79 yo F with PMHx HLD, chronic LE edema, hx L-TKA (2016), L forefoot reconstruction (2021) BIBEMS from home s/p mechanical fall found to have R distal femur fx admitted to orthopedic surgery with plan for OR. Medicine consulted for pre-operative clearance.      #Pre-operative clearance – CBC/CMP largely within normal limits apart from mild anemia to Hb 12.2. METS >4. No significant cardiac/pulmonary history apart from HTN/HLD although CXR with cardiomegaly. TTE 6/2023 showing LVEF 54%, mild LVH, mild MR. No adverse reactions to anesthesia in the past in self or first-degree relatives. RCRI 0 points (Class I Risk) ~ 3.9% for 30d risk of death, MI, or cardiac arrest. Adame score 0.2% for risk of MI or cardiac arrest, intraoperatively or up to 30d post-op. Low-intermediate risk for intermediate-risk procedure.     #LE edema - Hold spironolactone janay-operatively.    Agree with remainder of resident plan as above.

## 2024-01-01 NOTE — ED PROVIDER NOTE - PROGRESS NOTE DETAILS
[place]: Received s/o pending CTH prior to ortho admit. CTH performed no obvious bleeding on my read. Rediscussed w/ ortho, will admit for further care.

## 2024-01-01 NOTE — PRE-ANESTHESIA EVALUATION ADULT - NSANTHRISKNONERD_GEN_ALL_CORE
Detail Level: Simple
Initiate Treatment: . \\nKetoconazole 2% shampoo. To be used twice or 3 times a week. Apply to dry scalp and leave for 5 to 10 minutes . Then wash it off.   \\nApply Clindamycin 1% solution daily
No risk alerts present

## 2024-01-01 NOTE — ED PROVIDER NOTE - PHYSICAL EXAMINATION
CONST: nontoxic NAD speaking in full sentences  HEAD: atraumatic  EYES: EOMI  NECK: supple  CARD: regular rate  CHEST: breathing comfortably, no stridor/retractions/tripoding, b/l BS present  ABD: soft nontender  EXT: pelvis stable, hips nontender, R femur +gross deformity +ttp, able to wiggle her toes, DP pulse present  SKIN: warm, dry  NEURO: awake alert answering questions following commands moving all extremities

## 2024-01-01 NOTE — PATIENT PROFILE ADULT - FALL HARM RISK - FACTORS
OFFICE VISIT      Patient: Honorio Davis   : 1931 MRN: [id number]    SUBJECTIVE:  Chief Complaint   Patient presents with   • Pre-Op Exam     Pt agreed to Scribble scribe - attestation.     A 89 year old male is here for a preoperative examination. The planned procedure is prostate biopsy by perineal and is scheduled on 1/10/2022 by Dr. Andres Lockwood.    PRIMARY CARE PHYSICIAN: Dr. Christopher Wilson MD  REQUESTING PHYSICIAN:  Urologist, Dr. Andres Lockwood MD      HISTORY OF PRESENT ILLNESS:    Patient has given consent to record this visit for documentation in their clinical record.    Pre op exam: Seeing urologist, Andres Lockwood. MRI of prostate was not done as insurance would not cover it. PSA from 2021 was 30, a year ago it was 16. His son was recently treated for prostate cancer, and he has a brother that was also treated for prostate cancer. Chest X-ray done was normal. EKG done on 2021 showed similar reports as the EKG from 2019.      SURGICAL/ANESTHESIA HISTORY:    []  YES    [x]  NO     []  UNKNOWN   History of problematic/difficult intubations.  []  YES    [x]  NO     []  UNKNOWN   History of prior anesthesia reactions.  []  YES    [x]  NO     []  UNKNOWN   Family history of anesthesia reactions.  []  YES    [x]  NO     []  UNKNOWN   History of bleeding or clotting disorders.  []  YES    [x]  NO     []  UNKNOWN   Family history of bleeding/clotting disorders.  []  YES    [x]  NO     []  UNKNOWN   Past history of blood transfusions.  []  YES    [x]  NO     []  UNKNOWN   History of exposure/treatment for hepatitis.  []  YES    [x]  NO     []  UNKNOWN   History of exposure to or treatment for TB.  []  YES    [x]  NO     []  UNKNOWN   History of ARC or AIDS related complex.    Information related to the above positive findings include: None.      PAST MEDICAL HISTORY:  Past Medical History:   Diagnosis Date   • Actinic keratosis     Bilateral hands, upper arms   • Acute  cholecystitis with chronic cholecystitis 06/2015   • BPH (benign prostatic hyperplasia)    • Diabetes mellitus (CMS/[facility])    • Episodic mood disorder (CMS/[facility])    • Essential hypertension 12/11/2019   • Hearing loss    • Hemorrhoids    • Hyperlipidemia    • Kidney stone     Early 1990s - recurrence 05/2017   • Mitral regurgitation    • Renal insufficiency 05/2017   • SVT (supraventricular tachycardia) (CMS/[facility])     Dr. Humphrey   • Vitamin D deficiency      MEDICATIONS:  Current Outpatient Medications   Medication Sig   • metoPROLOL tartrate (LOPRESSOR) 25 MG tablet TAKE 1 TABLET EVERY 12     HOURS   • triamcinolone (ARISTOCORT) 0.1 % cream Apply topically 2 times daily.   • cetirizine (ZyrTEC) 10 MG tablet Take 1 tablet by mouth daily.   • azelastine (ASTELIN) 0.1 % nasal spray Spray 1 spray in each nostril 2 times daily as needed for Rhinitis. Use in each nostril as directed   • Multiple Vitamin (MULTIVITAMINS PO) Take  by mouth.     • enalapril (VASOTEC) 20 MG tablet TAKE 1 TABLET TWICE A DAY   • tamsulosin (FLOMAX) 0.4 MG Cap TAKE 1 CAPSULE AT BEDTIME   • Cholecalciferol (VITAMIN D PO) Take  by mouth.       No current facility-administered medications for this visit.     ALLERGIES:  ALLERGIES:   Allergen Reactions   • Niacin RASH, SWELLING, PRURITUS and Angioedema   • Statins RASH     PAST SURGICAL HISTORY:  Past Surgical History:   Procedure Laterality Date   • Cardiac catherization  1999   • Cholecystectomy  06/2015   • Kidney stone surgery     • Removal gallbladder       FAMILY HISTORY:  Family History   Problem Relation Age of Onset   • Aneurysm Mother         AAA   • Myocardial Infarction Father    • Cancer, Prostate Brother 72   • Kidney disease Brother      SOCIAL HISTORY:  Social History     Tobacco Use   Smoking Status Never Smoker   Smokeless Tobacco Never Used     Social History     Substance and Sexual Activity   Alcohol Use Not Currently    Comment: rare occasions       Review Of  Systems:  Genitourinary: Per HPI.     OBJECTIVE:  Vitals:    01/03/22 1440   BP: (!) 160/80   BP Location: LUE - Left upper extremity   Patient Position: Sitting   Cuff Size: Regular   Pulse: 60   Resp: 18   Temp: 97.7 °F (36.5 °C)   SpO2: 96%   Weight: 76.8 kg (169 lb 5 oz)   Height: 5' 5.75\" (1.67 m)       PHYSICAL EXAM  Constitutional: Alert, in no acute distress and current vital signs reviewed.   Head and Face: Atraumatic and normocephalic.   Eyes: No discharge, no eyelid swelling and the sclerae were normal.   ENT: Oropharynx normal. Normal appearing outer ear. Tympanic membranes are bilaterally clear, normal appearing nose and normal lips.   Neck: Normal appearing neck and supple neck.   Pulmonary: Breath sounds clear to auscultation bilaterally, but no respiratory distress and normal respiratory rate and effort.   Cardiovascular: Normal rate, regular rhythm, normal S1, normal S2 and edema was not present in the lower extremities.   Abdomen: Soft and nontender.   Psychiatric: Alert and awake, interactive and mood/affect were appropriate.   Skin, Hair, Nails: Normal skin color and pigmentation.    ECG RESULTS:  Compared to previous EKG showed similar report.    RADIOLOGY AND LAB RESULTS:    Lab Results   Component Value Date    WBC 4.1 (L) 12/29/2021    RBC 4.87 12/29/2021    HCT 44.3 12/29/2021    HGB 15.1 12/29/2021     12/29/2021    SODIUM 138 12/29/2021    POTASSIUM 3.7 12/29/2021    CO2 29 12/29/2021    CHLORIDE 103 12/29/2021    BUN 14 12/29/2021    CREATININE 1.05 12/29/2021    GLUCOSE 213 (H) 12/29/2021       ASSESSMENT AND PLAN:  This is a 89 year old male who presents with :  1. Essential hypertension    2. Stage 3a chronic kidney disease (CMS/HCC)    3. SVT (supraventricular tachycardia) (CMS/HCC)    4. Mixed hyperlipidemia    5. IFG (impaired fasting glucose)    6. Vitamin D deficiency    7. Benign prostatic hyperplasia with urinary retention    8. Elevated PSA    9. Renal insufficiency     10. Preoperative cardiovascular examination        No orders of the defined types were placed in this encounter.      Plan:  Patient is medically optimized for the surgery.  Reviewed and discussed past labs.  Reviewed EKG and X-ray chest.  Discussed various causes for elevated PSA that includes prostate cancer or inflammation of prostate.  Biopsy is recommended for ruling out prostate cancer.   Advised discussing with family for further suggested plan.   Educated on further diagnostic measures including CT scan, PET scan etc.  Discussed management of prostate cancer.      Perioperative management and restrictions as per the recommendation of the surgeon.  The patient is to avoid NSAID's, aspirin and alcohol for the week preceding surgery.  All other chronic medications are to be continued unless an alternative plan was advised.    Refer to orders.  Medical compliance with plan discussed and risks of non-compliance reviewed.  Patient education completed on disease process, etiology & prognosis.  Proper usage and side effects of medications reviewed & discussed.  Patient understands and agrees with the plan.  Return to clinic as clinically indicated as discussed with patient who verbalized understanding of the plan and is in agreement with the plan.    Return in about 4 weeks (around 1/31/2022) for MULTIPLE MEDICAL ISSUES.    I,  Dr. Josefina Kellogg, have created a visit summary document based on the audio recording between Dr. Christopher Wilson MD and this patient for the physician to review, edit as needed, and authenticate.  Creation Date: 3/16/2022     Christopher DUPONT M.D. [facility] - have reviewed and edited the visit summary above and attest that it is accurate.     Impaired gait

## 2024-01-01 NOTE — PRE-ANESTHESIA EVALUATION ADULT - NSANTHPMHFT_GEN_ALL_CORE
77yo F s/p mechanical fall with R distal femur fracture    PMH:  HLD  LE edema  Spinal stenosis  "Enlarged aorta"    PSH:  L TKA 2016  L foot reconstruction 2021  rhinoplasty   L knee scope    NKDA    MEds:  spironalactone  lipitor    METS>4

## 2024-01-01 NOTE — ED ADULT NURSE NOTE - CHIEF COMPLAINT
[FreeTextEntry1] : Patient left message that she is at [facility] [[email or web address] ED, 
The patient is a 78y Female complaining of fall.

## 2024-01-01 NOTE — CONSULT NOTE ADULT - SUBJECTIVE AND OBJECTIVE BOX
Patient is a 78y old  Female who presents with a chief complaint of R distal femur fx (01 Jan 2024 02:38)      HPI:  78y Female PS L TKA 2016 w/ KECIA Allen forfoot reconstruction Dr. Boswell 2021, presents with RIGHT // distal thigh //s/p mechanical fall. Denies numbness/tingling in the affected extremity. Endorses head strike/ no LOC/other orthopedic injuries at this time. Patient ambulates without assistance  at baseline. Patient does not take any anticoagulation/antiplatelet medications    Inability to walk after injury, immediate pain    HPI PER MEDICINE:  78y Female w/ PMHx HLD, and LE edema (on spironolactone), PSH L TKA 2016 w/ KECIA Allen forfoot reconstruction Dr. Boswell 2021, presents after fall. She says she slipped on her kitchen floor and was in severe pain in her right leg after. She ambulates on her own without cane/walker at baseline. She is able to walk to the bank and back but is limited due to back discomfort (notes she has spinal stenosis and is planned for injection in her back which helps her pain).   Home meds: Lipitor 10mg qd and Spironolactone 25mg qd    PAST MEDICAL & SURGICAL HISTORY:  Dyslipidemia      Enlarged aorta      Heart murmur      H/O rhinoplasty      S/P bunionectomy      H/O arthroscopy of left knee      H/O foot surgery      H/O total knee replacement        Home Medications:  Lipitor 10 mg oral tablet: 1 tab(s) orally once a day (19 Nov 2021 06:21)  spironolactone 25 mg oral tablet: 1 tab(s) orally 2 times a day (19 Nov 2021 06:21)    Allergies    No Known Allergies    Intolerances                              11.2   8.68  )-----------( 227      ( 01 Jan 2024 00:32 )             34.8     01-01    138  |  106  |  23  ----------------------------<  181<H>  3.6   |  20<L>  |  0.81    Ca    8.9      01 Jan 2024 00:32      PT/INR - ( 01 Jan 2024 00:32 )   PT: 12.3 sec;   INR: 1.08          PTT - ( 01 Jan 2024 00:32 )  PTT:30.4 sec  Urinalysis Basic - ( 01 Jan 2024 00:32 )    Color: x / Appearance: x / SG: x / pH: x  Gluc: 181 mg/dL / Ketone: x  / Bili: x / Urobili: x   Blood: x / Protein: x / Nitrite: x   Leuk Esterase: x / RBC: x / WBC x   Sq Epi: x / Non Sq Epi: x / Bacteria: x          Vital Signs Last 24 Hrs  T(C): 37.1 (31 Dec 2023 23:29), Max: 37.1 (31 Dec 2023 23:29)  T(F): 98.7 (31 Dec 2023 23:29), Max: 98.7 (31 Dec 2023 23:29)  HR: 98 (31 Dec 2023 23:29) (98 - 98)  BP: 158/76 (31 Dec 2023 23:29) (158/76 - 158/76)  BP(mean): --  RR: 22 (31 Dec 2023 23:29) (22 - 22)  SpO2: 98% (31 Dec 2023 23:29) (98% - 98%)    Parameters below as of 31 Dec 2023 23:29  Patient On (Oxygen Delivery Method): room air        PHYSICAL EXAM  General: NAD, Awake and Alert    RIGHT // Lower Extremity:   Skin intact   - Ecchymosis of the thigh  + Swelling of the thigh  + Gross deformity of the distal thigh  TTP over the distal thigh // knee  NTTP over the bony prominences of the hip/ankle/foot/toes  L2-S1 SILT  +EHL/FHL/TA/GSC  +DP pulses  Calf nontender  Compartments soft and compressible      .secondary survey negative for additional injury       IMAGING:  XR RIGHT //  Hip: No evidence of other fracture or dislocation  XR RIGHT //Femur: Distal femur fracture  XR RIGHT // Knee: pending   Pending CT femur for intra-articular extension       (01 Jan 2024 02:06)      REVIEW OF SYSTEMS: see HPI    PAST MEDICAL & SURGICAL HISTORY:  Dyslipidemia      Enlarged aorta      Heart murmur      H/O rhinoplasty      S/P bunionectomy      H/O arthroscopy of left knee      H/O foot surgery      H/O total knee replacement          FAMILY HISTORY:    SOCIAL HISTORY:    MEDICATIONS:  MEDICATIONS  (STANDING):  acetaminophen     Tablet .. 1000 milliGRAM(s) Oral every 8 hours  atorvastatin 10 milliGRAM(s) Oral at bedtime  chlorhexidine 2% Cloths 1 Application(s) Topical <User Schedule>  multivitamin 1 Tablet(s) Oral daily  povidone iodine 5% Nasal Swab 1 Application(s) Both Nostrils once  sodium chloride 0.9%. 1000 milliLiter(s) (125 mL/Hr) IV Continuous <Continuous>    MEDICATIONS  (PRN):  HYDROmorphone  Injectable 0.5 milliGRAM(s) IV Push every 6 hours PRN breakthrough  magnesium hydroxide Suspension 30 milliLiter(s) Oral daily PRN Constipation  oxyCODONE    IR 10 milliGRAM(s) Oral every 4 hours PRN Severe Pain (7 - 10)  oxyCODONE    IR 5 milliGRAM(s) Oral every 4 hours PRN Moderate Pain (4 - 6)      ALLERGIES:  Allergies    No Known Allergies    Intolerances        VITAL SIGNS:  Vital Signs Last 24 Hrs  T(C): 37.1 (31 Dec 2023 23:29), Max: 37.1 (31 Dec 2023 23:29)  T(F): 98.7 (31 Dec 2023 23:29), Max: 98.7 (31 Dec 2023 23:29)  HR: 98 (31 Dec 2023 23:29) (98 - 98)  BP: 158/76 (31 Dec 2023 23:29) (158/76 - 158/76)  BP(mean): --  RR: 22 (31 Dec 2023 23:29) (22 - 22)  SpO2: 98% (31 Dec 2023 23:29) (98% - 98%)    Parameters below as of 31 Dec 2023 23:29  Patient On (Oxygen Delivery Method): room air      PHYSICAL EXAM:  Constitutional: resting comfortably in bed; NAD  Head: NC/AT  Eyes: PERRL, EOMI, anicteric sclera  ENT: no nasal discharge;  Respiratory: CTA B/L;   Cardiac: +S1/S2; RRR; no M/R/G;   Gastrointestinal: abdomen soft, NT, mildly distended;   Extremities: WWP, +b/L LE w/ spider veins and +1 non-pitting edema; Right lower extremity w/significant pain w/ movement;  Neurologic: AAOx3;     LABS:                        11.2   8.68  )-----------( 227      ( 01 Jan 2024 00:32 )             34.8     01-01    138  |  106  |  23  ----------------------------<  181<H>  3.6   |  20<L>  |  0.81    Ca    8.9      01 Jan 2024 00:32      PT/INR - ( 01 Jan 2024 00:32 )   PT: 12.3 sec;   INR: 1.08          PTT - ( 01 Jan 2024 00:32 )  PTT:30.4 sec  Urinalysis Basic - ( 01 Jan 2024 00:32 )    Color: x / Appearance: x / SG: x / pH: x  Gluc: 181 mg/dL / Ketone: x  / Bili: x / Urobili: x   Blood: x / Protein: x / Nitrite: x   Leuk Esterase: x / RBC: x / WBC x   Sq Epi: x / Non Sq Epi: x / Bacteria: x      CAPILLARY BLOOD GLUCOSE      RADIOLOGY & ADDITIONAL TESTS: Reviewed.

## 2024-01-01 NOTE — ED PROVIDER NOTE - OBJECTIVE STATEMENT
78yF w/ HTN on atorvastatin, PVD on spirinolactone, functional and independent, comes in for R thigh pain s/p non-syncopal fall. Was in the kitchen and the floor was wet, slipped and fell w/ her leg underneath her and heard a snap. Unable to get up after, says took EMS 30 min to get her up. Denies head strike, LOC, any other injuries. At this time has pain to R thigh. Pt is not on AC.

## 2024-01-01 NOTE — PROGRESS NOTE ADULT - SUBJECTIVE AND OBJECTIVE BOX
24HR EVENTS:     SUBJECTIVE: Pt seen and examined on morning rounds.   Denies CP, SOB, N/V, new onset motor/sensory deficits  doing well no issues     Vital Signs Last 24 Hrs  T(C): 36.5 (01 Jan 2024 06:52), Max: 37.1 (31 Dec 2023 23:29)  T(F): 97.7 (01 Jan 2024 05:00), Max: 98.7 (31 Dec 2023 23:29)  HR: 84 (01 Jan 2024 06:52) (84 - 98)  BP: 131/55 (01 Jan 2024 06:52) (99/61 - 158/76)  BP(mean): 74 (01 Jan 2024 06:52) (74 - 74)  RR: 17 (01 Jan 2024 06:52) (17 - 22)  SpO2: 94% (01 Jan 2024 06:52) (94% - 98%)    Parameters below as of 01 Jan 2024 05:00  Patient On (Oxygen Delivery Method): room air        Physical Exam:  General: NAD, resting comfortably in bed  RLE  skin intact  silt sural/saph/dpn/spn/tib   5/5 ehl/fhl/ta/gs  2+ dp     Labs:                        11.2   8.68  )-----------( 227      ( 01 Jan 2024 00:32 )             34.8     01 Jan 2024 00:32    138    |  106    |  23     ----------------------------<  181    3.6     |  20     |  0.81     Ca    8.9        01 Jan 2024 00:32      PT/INR - ( 01 Jan 2024 00:32 )   PT: 12.3 sec;   INR: 1.08          PTT - ( 01 Jan 2024 00:32 )  PTT:30.4 sec      Assessment/Plan:  78yF w/ R fem shaft fx, plan for R retrograde IMN by Dr. LINDA Jeffrey on 01-01   - Weight Bearing Status: nwb rle   - Pain control  - DVT PPx: scds  - PT rec: pending   - F/u AM labs    Gurpreet Gonzalez, PGY-3  Orthopedic Surgery

## 2024-01-01 NOTE — PATIENT PROFILE ADULT - FUNCTIONAL ASSESSMENT - BASIC MOBILITY 6.
3-calculated by average/Not able to assess (calculate score using [facility] averaging method)  3-calculated by average/Not able to assess (calculate score using [facility] averaging method)

## 2024-01-01 NOTE — PATIENT PROFILE ADULT - FALL HARM RISK - HARM RISK INTERVENTIONS
Assistance with ambulation/Assistance OOB with selected safe patient handling equipment/Communicate Risk of Fall with Harm to all staff/Discuss with provider need for PT consult/Monitor gait and stability/Provide patient with walking aids - walker, cane, crutches/Reinforce activity limits and safety measures with patient and family/Tailored Fall Risk Interventions/Visual Cue: Yellow wristband and red socks/Bed in lowest position, wheels locked, appropriate side rails in place/Call bell, personal items and telephone in reach/Instruct patient to call for assistance before getting out of bed or chair/Non-slip footwear when patient is out of bed/[place] to call system/Physically safe environment - no spills, clutter or unnecessary equipment/Purposeful Proactive Rounding/Room/bathroom lighting operational, light cord in reach Assistance with ambulation/Assistance OOB with selected safe patient handling equipment/Communicate Risk of Fall with Harm to all staff/Discuss with provider need for PT consult/Monitor gait and stability/Provide patient with walking aids - walker, cane, crutches/Reinforce activity limits and safety measures with patient and family/Tailored Fall Risk Interventions/Visual Cue: Yellow wristband and red socks/Bed in lowest position, wheels locked, appropriate side rails in place/Call bell, personal items and telephone in reach/Instruct patient to call for assistance before getting out of bed or chair/Non-slip footwear when patient is out of bed/[place] to call system/Physically safe environment - no spills, clutter or unnecessary equipment/Purposeful Proactive Rounding/Room/bathroom lighting operational, light cord in reach

## 2024-01-02 ENCOUNTER — TRANSCRIPTION ENCOUNTER (OUTPATIENT)
Age: 79
End: 2024-01-02

## 2024-01-02 LAB
ANION GAP SERPL CALC-SCNC: 6 MMOL/L — SIGNIFICANT CHANGE UP (ref 5–17)
ANION GAP SERPL CALC-SCNC: 6 MMOL/L — SIGNIFICANT CHANGE UP (ref 5–17)
BUN SERPL-MCNC: 13 MG/DL — SIGNIFICANT CHANGE UP (ref 7–23)
BUN SERPL-MCNC: 13 MG/DL — SIGNIFICANT CHANGE UP (ref 7–23)
CALCIUM SERPL-MCNC: 8.9 MG/DL — SIGNIFICANT CHANGE UP (ref 8.4–10.5)
CALCIUM SERPL-MCNC: 8.9 MG/DL — SIGNIFICANT CHANGE UP (ref 8.4–10.5)
CHLORIDE SERPL-SCNC: 105 MMOL/L — SIGNIFICANT CHANGE UP (ref 96–108)
CHLORIDE SERPL-SCNC: 105 MMOL/L — SIGNIFICANT CHANGE UP (ref 96–108)
CO2 SERPL-SCNC: 26 MMOL/L — SIGNIFICANT CHANGE UP (ref 22–31)
CO2 SERPL-SCNC: 26 MMOL/L — SIGNIFICANT CHANGE UP (ref 22–31)
CREAT SERPL-MCNC: 0.73 MG/DL — SIGNIFICANT CHANGE UP (ref 0.5–1.3)
CREAT SERPL-MCNC: 0.73 MG/DL — SIGNIFICANT CHANGE UP (ref 0.5–1.3)
EGFR: 83 ML/MIN/1.73M2 — SIGNIFICANT CHANGE UP
EGFR: 83 ML/MIN/1.73M2 — SIGNIFICANT CHANGE UP
GLUCOSE SERPL-MCNC: 131 MG/DL — HIGH (ref 70–99)
GLUCOSE SERPL-MCNC: 131 MG/DL — HIGH (ref 70–99)
HCT VFR BLD CALC: 23.7 % — LOW (ref 34.5–45)
HCT VFR BLD CALC: 23.7 % — LOW (ref 34.5–45)
HGB BLD-MCNC: 7.8 G/DL — LOW (ref 11.5–15.5)
HGB BLD-MCNC: 7.8 G/DL — LOW (ref 11.5–15.5)
MCHC RBC-ENTMCNC: 29.3 PG — SIGNIFICANT CHANGE UP (ref 27–34)
MCHC RBC-ENTMCNC: 29.3 PG — SIGNIFICANT CHANGE UP (ref 27–34)
MCHC RBC-ENTMCNC: 32.9 GM/DL — SIGNIFICANT CHANGE UP (ref 32–36)
MCHC RBC-ENTMCNC: 32.9 GM/DL — SIGNIFICANT CHANGE UP (ref 32–36)
MCV RBC AUTO: 89.1 FL — SIGNIFICANT CHANGE UP (ref 80–100)
MCV RBC AUTO: 89.1 FL — SIGNIFICANT CHANGE UP (ref 80–100)
NRBC # BLD: 0 /100 WBCS — SIGNIFICANT CHANGE UP (ref 0–0)
NRBC # BLD: 0 /100 WBCS — SIGNIFICANT CHANGE UP (ref 0–0)
PLATELET # BLD AUTO: 191 K/UL — SIGNIFICANT CHANGE UP (ref 150–400)
PLATELET # BLD AUTO: 191 K/UL — SIGNIFICANT CHANGE UP (ref 150–400)
POTASSIUM SERPL-MCNC: 4.1 MMOL/L — SIGNIFICANT CHANGE UP (ref 3.5–5.3)
POTASSIUM SERPL-MCNC: 4.1 MMOL/L — SIGNIFICANT CHANGE UP (ref 3.5–5.3)
POTASSIUM SERPL-SCNC: 4.1 MMOL/L — SIGNIFICANT CHANGE UP (ref 3.5–5.3)
POTASSIUM SERPL-SCNC: 4.1 MMOL/L — SIGNIFICANT CHANGE UP (ref 3.5–5.3)
RBC # BLD: 2.66 M/UL — LOW (ref 3.8–5.2)
RBC # BLD: 2.66 M/UL — LOW (ref 3.8–5.2)
RBC # FLD: 14.3 % — SIGNIFICANT CHANGE UP (ref 10.3–14.5)
RBC # FLD: 14.3 % — SIGNIFICANT CHANGE UP (ref 10.3–14.5)
SODIUM SERPL-SCNC: 137 MMOL/L — SIGNIFICANT CHANGE UP (ref 135–145)
SODIUM SERPL-SCNC: 137 MMOL/L — SIGNIFICANT CHANGE UP (ref 135–145)
WBC # BLD: 8.66 K/UL — SIGNIFICANT CHANGE UP (ref 3.8–10.5)
WBC # BLD: 8.66 K/UL — SIGNIFICANT CHANGE UP (ref 3.8–10.5)
WBC # FLD AUTO: 8.66 K/UL — SIGNIFICANT CHANGE UP (ref 3.8–10.5)
WBC # FLD AUTO: 8.66 K/UL — SIGNIFICANT CHANGE UP (ref 3.8–10.5)

## 2024-01-02 PROCEDURE — 99232 SBSQ HOSP IP/OBS MODERATE 35: CPT

## 2024-01-02 RX ADMIN — FAMOTIDINE 40 MILLIGRAM(S): 10 INJECTION INTRAVENOUS at 05:20

## 2024-01-02 RX ADMIN — Medication 1 TABLET(S): at 11:09

## 2024-01-02 RX ADMIN — Medication 1 TABLET(S): at 01:23

## 2024-01-02 RX ADMIN — Medication 1000 MILLIGRAM(S): at 11:09

## 2024-01-02 RX ADMIN — ATORVASTATIN CALCIUM 10 MILLIGRAM(S): 80 TABLET, FILM COATED ORAL at 22:03

## 2024-01-02 RX ADMIN — Medication 100 MILLIGRAM(S): at 03:04

## 2024-01-02 RX ADMIN — Medication 1000 MILLIGRAM(S): at 22:03

## 2024-01-02 RX ADMIN — Medication 1000 MILLIGRAM(S): at 12:12

## 2024-01-02 RX ADMIN — Medication 81 MILLIGRAM(S): at 18:08

## 2024-01-02 RX ADMIN — FAMOTIDINE 40 MILLIGRAM(S): 10 INJECTION INTRAVENOUS at 18:08

## 2024-01-02 RX ADMIN — Medication 1000 MILLIGRAM(S): at 23:03

## 2024-01-02 NOTE — DISCHARGE NOTE PROVIDER - NSDCMRMEDTOKEN_GEN_ALL_CORE_FT
aspirin 81 mg oral delayed release tablet: 1 tab(s) orally once a day  Lipitor 10 mg oral tablet: 1 tab(s) orally once a day  Rolling Walker: 1   spironolactone 25 mg oral tablet: 1 tab(s) orally 2 times a day  Valtrex 500 mg oral tablet: 1 tab(s) orally once a day, As Needed   acetaminophen 500 mg oral tablet: 2 tab(s) orally every 8 hours  aspirin 81 mg oral tablet, chewable: 1 tab(s) orally 2 times a day  atorvastatin 10 mg oral tablet: 1 tab(s) orally once a day (at bedtime)  calcium carbonate 500 mg (200 mg elemental calcium) oral tablet, chewable: 1 tab(s) orally every 6 hours As needed Indigestion  magnesium hydroxide 8% oral suspension: 30 milliliter(s) orally once a day As needed Constipation  magnesium hydroxide 8% oral suspension: 30 milliliter(s) orally once a day As needed Constipation  Multiple Vitamins oral tablet: 1 tab(s) orally once a day  oxyCODONE 10 mg oral tablet: 1 tab(s) orally every 4 hours As needed Severe Pain (7 - 10)  oxyCODONE 5 mg oral tablet: 1 tab(s) orally every 4 hours As needed Moderate Pain (4 - 6)  Rolling Walker: 1   senna leaf extract oral tablet: 2 tab(s) orally once a day (at bedtime)  spironolactone 25 mg oral tablet: 1 tab(s) orally 2 times a day  Valtrex 500 mg oral tablet: 1 tab(s) orally once a day, As Needed   acetaminophen 500 mg oral tablet: 2 tab(s) orally every 8 hours  aspirin 81 mg oral tablet, chewable: 1 tab(s) orally 2 times a day  atorvastatin 10 mg oral tablet: 1 tab(s) orally once a day (at bedtime)  bacitracin 500 units/g topical ointment: 1 Apply topically to affected area 2 times a day  calcium carbonate 500 mg (200 mg elemental calcium) oral tablet, chewable: 1 tab(s) orally every 6 hours As needed Indigestion  famotidine 20 mg oral tablet: 1 tab(s) orally 2 times a day  magnesium hydroxide 8% oral suspension: 30 milliliter(s) orally once a day As needed Constipation  Multiple Vitamins oral tablet: 1 tab(s) orally once a day  oxyCODONE 10 mg oral tablet: 1 tab(s) orally every 4 hours As needed Severe Pain (7 - 10)  oxyCODONE 5 mg oral tablet: 1 tab(s) orally every 4 hours As needed Moderate Pain (4 - 6)  polyethylene glycol 3350 oral powder for reconstitution: 17 gram(s) orally once a day (at bedtime)  senna leaf extract oral tablet: 2 tab(s) orally once a day (at bedtime)  spironolactone 25 mg oral tablet: 1 tab(s) orally 2 times a day  Valtrex 500 mg oral tablet: 1 tab(s) orally once a day, As Needed   acetaminophen 500 mg oral tablet: 2 tab(s) orally every 8 hours  aspirin 81 mg oral tablet, chewable: 1 tab(s) orally 2 times a day  atorvastatin 10 mg oral tablet: 1 tab(s) orally once a day (at bedtime)  bacitracin 500 units/g topical ointment: 1 Apply topically to affected area 2 times a day  bisacodyl 5 mg oral delayed release tablet: 1 tab(s) orally every 12 hours as needed for  constipation  calcium carbonate 500 mg (200 mg elemental calcium) oral tablet, chewable: 1 tab(s) orally every 6 hours As needed Indigestion  famotidine 20 mg oral tablet: 1 tab(s) orally 2 times a day  magnesium hydroxide 8% oral suspension: 30 milliliter(s) orally once a day As needed Constipation  Multiple Vitamins oral tablet: 1 tab(s) orally once a day  oxyCODONE 10 mg oral tablet: 1 tab(s) orally every 4 hours As needed Severe Pain (7 - 10)  oxyCODONE 5 mg oral tablet: 1 tab(s) orally every 4 hours As needed Moderate Pain (4 - 6)  polyethylene glycol 3350 oral powder for reconstitution: 17 gram(s) orally once a day (at bedtime)  senna leaf extract oral tablet: 2 tab(s) orally once a day (at bedtime)  spironolactone 25 mg oral tablet: 1 tab(s) orally 2 times a day  Valtrex 500 mg oral tablet: 1 tab(s) orally once a day, As Needed   acetaminophen 500 mg oral tablet: 2 tab(s) orally every 8 hours  aspirin 81 mg oral tablet, chewable: 1 tab(s) orally 2 times a day  atorvastatin 10 mg oral tablet: 1 tab(s) orally once a day (at bedtime)  bacitracin 500 units/g topical ointment: Apply topically to affected area 3 times a day 1 Apply topically to affected area 3 times a day  bisacodyl 5 mg oral delayed release tablet: 1 tab(s) orally every 12 hours as needed for  constipation  calcium carbonate 500 mg (200 mg elemental calcium) oral tablet, chewable: 1 tab(s) orally every 6 hours As needed Indigestion  famotidine 20 mg oral tablet: 1 tab(s) orally 2 times a day  magnesium hydroxide 8% oral suspension: 30 milliliter(s) orally once a day As needed Constipation  Multiple Vitamins oral tablet: 1 tab(s) orally once a day  oxyCODONE 10 mg oral tablet: 1 tab(s) orally every 4 hours As needed Severe Pain (7 - 10)  oxyCODONE 5 mg oral tablet: 1 tab(s) orally every 4 hours As needed Moderate Pain (4 - 6)  polyethylene glycol 3350 oral powder for reconstitution: 17 gram(s) orally once a day (at bedtime)  senna leaf extract oral tablet: 2 tab(s) orally once a day (at bedtime)  spironolactone 25 mg oral tablet: 1 tab(s) orally 2 times a day  Valtrex 500 mg oral tablet: 1 tab(s) orally once a day, As Needed

## 2024-01-02 NOTE — PROGRESS NOTE ADULT - ASSESSMENT
78y Female w/ PMHx HLD, and LE edema (on spironolactone), PSH L TKA 2016 w/ Dr. Posadas, L fore foot reconstruction Dr. Boswell 2021, presents after fall, found to have right distal femur fracture, admitted to orthopedic surgery. Medicine consulted for pre-op evaluation for ORIF R distal femur.        # Right Distal Femur Fracture   - S/p ORIF on 1/1/24   - pain control , DVT prophylaxis , Weightbearing status , Dressing changes and drain care per ortho team      # Acute Blood loss Anemia   - Monitor HGB , transfuse if < 7gm/dl or if symptomatic     # HLD     # LE Edema   - can restart spironolactone 1/3/24    # DVT prophylaxis per Ortho

## 2024-01-02 NOTE — DISCHARGE NOTE PROVIDER - NSDCFUSCHEDAPPT_GEN_ALL_CORE_FT
[facility]  [address]  Scheduled Appointment: 02/22/2024    [facility]  DIAGRAD [address]  Scheduled Appointment: 02/22/2024     [facility]  [address]  Scheduled Appointment: 02/22/2024    [facility]  DI[address]RAD [address]  Scheduled Appointment: 02/22/2024

## 2024-01-02 NOTE — PROGRESS NOTE ADULT - SUBJECTIVE AND OBJECTIVE BOX
Patient is a 78y old  Female who presents with a chief complaint of R distal femur fx (02 Jan 2024 15:07)        SUBJECTIVE:  Patient was seen and examined at bedside.    Overnight Events : S/p ORIF      Review of systems: 12 point Review of systems negative unless otherwise documented elsewhere in note.     Diet, Regular (01-01-24 @ 08:19) [Active]      MEDICATIONS:  MEDICATIONS  (STANDING):  acetaminophen     Tablet .. 1000 milliGRAM(s) Oral every 8 hours  aspirin  chewable 81 milliGRAM(s) Oral two times a day  atorvastatin 10 milliGRAM(s) Oral at bedtime  chlorhexidine 2% Cloths 1 Application(s) Topical <User Schedule>  famotidine    Tablet 40 milliGRAM(s) Oral two times a day  lactated ringers. 1000 milliLiter(s) (100 mL/Hr) IV Continuous <Continuous>  multivitamin 1 Tablet(s) Oral daily  polyethylene glycol 3350 17 Gram(s) Oral at bedtime  povidone iodine 5% Nasal Swab 1 Application(s) Both Nostrils once  senna 2 Tablet(s) Oral at bedtime  sodium chloride 0.9%. 1000 milliLiter(s) (125 mL/Hr) IV Continuous <Continuous>    MEDICATIONS  (PRN):  HYDROmorphone  Injectable 0.5 milliGRAM(s) IV Push every 15 minutes PRN breakthrough  HYDROmorphone  Injectable 0.5 milliGRAM(s) IV Push every 6 hours PRN breakthrough  magnesium hydroxide Suspension 30 milliLiter(s) Oral daily PRN Constipation  magnesium hydroxide Suspension 30 milliLiter(s) Oral daily PRN Constipation  ondansetron Injectable 4 milliGRAM(s) IV Push every 6 hours PRN Nausea and/or Vomiting  oxyCODONE    IR 10 milliGRAM(s) Oral every 4 hours PRN Severe Pain (7 - 10)  oxyCODONE    IR 5 milliGRAM(s) Oral every 4 hours PRN Moderate Pain (4 - 6)      Allergies    No Known Allergies    Intolerances        OBJECTIVE:  Vital Signs Last 24 Hrs  T(C): 36.4 (02 Jan 2024 08:57), Max: 36.6 (02 Jan 2024 05:37)  T(F): 97.6 (02 Jan 2024 08:57), Max: 97.8 (02 Jan 2024 05:37)  HR: 98 (02 Jan 2024 08:57) (73 - 98)  BP: 117/58 (02 Jan 2024 08:57) (114/64 - 129/61)  BP(mean): --  RR: 17 (02 Jan 2024 08:57) (17 - 18)  SpO2: 95% (02 Jan 2024 08:57) (95% - 100%)    Parameters below as of 02 Jan 2024 08:57  Patient On (Oxygen Delivery Method): room air      I&O's Summary    01 Jan 2024 07:01  -  02 Jan 2024 07:00  --------------------------------------------------------  IN: 1010 mL / OUT: 400 mL / NET: 610 mL        PHYSICAL EXAM:  Gen: Resting in bed at time of exam, not in distress   HEENT: moist mucosa, no lesions   Neck: supple, trachea at midline  CV: RRR, +S1/S2  Pulm: no wheezing , no crackles  no increase in work of breathing  Abd: soft, NTND  Skin: warm and dry, no new rashes   Ext:  Right leg and knee pain   Neuro: AOx3, no gross focal neurological deficits  Psych: affect and behavior appropriate, pleasant at time of interview    LABS:                        7.8    8.66  )-----------( 191      ( 02 Jan 2024 05:30 )             23.7     01-02    137  |  105  |  13  ----------------------------<  131<H>  4.1   |  26  |  0.73    Ca    8.9      02 Jan 2024 05:30        PT/INR - ( 01 Jan 2024 00:32 )   PT: 12.3 sec;   INR: 1.08          PTT - ( 01 Jan 2024 00:32 )  PTT:30.4 sec  CAPILLARY BLOOD GLUCOSE        Urinalysis Basic - ( 02 Jan 2024 05:30 )    Color: x / Appearance: x / SG: x / pH: x  Gluc: 131 mg/dL / Ketone: x  / Bili: x / Urobili: x   Blood: x / Protein: x / Nitrite: x   Leuk Esterase: x / RBC: x / WBC x   Sq Epi: x / Non Sq Epi: x / Bacteria: x        MICRODATA:    Urinalysis with Rflx Culture (collected 01 Jan 2024 01:41)        RADIOLOGY/OTHER STUDIES:

## 2024-01-02 NOTE — PROGRESS NOTE ADULT - SUBJECTIVE AND OBJECTIVE BOX
Procedure:  R femur retrograde nail   Surgeon: Dr. LINDA Jeffrey    Pt comfortable without complaints, pain controlled. doing well, RLE pain controlled   Denies CP, SOB, N/V, numbness/tingling     Vital Signs Last 24 Hrs  T(C): 34.7 (01-01-24 @ 15:38), Max: 34.7 (01-01-24 @ 15:38)  T(F): 94.5 (01-01-24 @ 15:38), Max: 94.5 (01-01-24 @ 15:38)  HR: 74 (01-01-24 @ 15:38) (74 - 74)  BP: 127/63 (01-01-24 @ 15:38) (127/63 - 127/63)  BP(mean): --  RR: 17 (01-01-24 @ 15:38) (17 - 17)  SpO2: 100% (01-01-24 @ 15:38) (100% - 100%)    General: Pt Alert and oriented, NAD  R leg dsg c/d/i, in KI   Pulses: 2+ DP  Sensation: SILT grossly SPN/DPN/Saph/Lizeth/Tib  Motor: 5/5 TA/GS/EHL,    A/P: 78yFemale s/p R femur retrograde nail by Dr. LINDA Jeffrey on 01-01  - Stable  - holding home spironolactone periop   - Pain Control  - DVT ppx: ASA   - Post op abx: Ancef  - WBS: WBAT  - PT eval     Ortho Pager [phone]     Procedure:  R femur retrograde nail   Surgeon: Dr. LINDA Jeffrey    Pt comfortable without complaints, pain controlled. doing well, RLE pain controlled   Denies CP, SOB, N/V, numbness/tingling     Vital Signs Last 24 Hrs  T(C): 34.7 (01-01-24 @ 15:38), Max: 34.7 (01-01-24 @ 15:38)  T(F): 94.5 (01-01-24 @ 15:38), Max: 94.5 (01-01-24 @ 15:38)  HR: 74 (01-01-24 @ 15:38) (74 - 74)  BP: 127/63 (01-01-24 @ 15:38) (127/63 - 127/63)  BP(mean): --  RR: 17 (01-01-24 @ 15:38) (17 - 17)  SpO2: 100% (01-01-24 @ 15:38) (100% - 100%)    General: Pt Alert and oriented, NAD  R leg dsg c/d/i, in KI   Pulses: 2+ DP  Sensation: SILT grossly SPN/DPN/Saph/Lizeth/Tib  Motor: 5/5 TA/GS/EHL,    A/P: 78yFemale s/p R femur retrograde nail by Dr. LINDA Jeffrey on 01-01  - Stable  - holding home spironolactone periop   - Pain Control  - DVT ppx: ASA   - Post op abx: Ancef  - WBS: WBAT  - PT eval     Ortho Pager [phone]

## 2024-01-02 NOTE — PROGRESS NOTE ADULT - ATTENDING SUPERVISION STATEMENT
Resident I will SWITCH the dose or number of times a day I take the medications listed below when I get home from the hospital:  None

## 2024-01-02 NOTE — DISCHARGE NOTE PROVIDER - CARE PROVIDER_API CALL
Mitchell Jeffrey  Orthopaedic Surgery  [address]  [place] [address]  Phone: [phone]  Fax: [phone]  Follow Up Time: 2 weeks   Mitchell Jeffrey  Orthopaedic Surgery  [address]  [place] [address]  Phone: [phone]  Fax: [phone]  Follow Up Time: 2 weeks

## 2024-01-02 NOTE — PHYSICAL THERAPY INITIAL EVALUATION ADULT - ADDITIONAL COMMENTS
Pt reports living in apartment without VERONICA, with . Reports being independent with daily activities without use of DME.

## 2024-01-02 NOTE — DISCHARGE NOTE PROVIDER - NSDCCPCAREPLAN_GEN_ALL_CORE_FT
PRINCIPAL DISCHARGE DIAGNOSIS  Diagnosis: Right femoral shaft fracture  Assessment and Plan of Treatment:

## 2024-01-02 NOTE — DISCHARGE NOTE PROVIDER - NSDCFUADDINST_GEN_ALL_CORE_FT
ACTIVITY:  - Weightbear as tolerated with assistive device and knee immobilizer x 2 weeks. No strenuous activity, heavy lifting, driving or returning to work until cleared by MD.  - You may experience postoperative swelling on the operative extremity. You may ice the surgery site for 20 minute intervals every hour.  ____________________  DRESSING: ***  - Keep your incision clean and dry. Do not pick at your incision. Do not apply creams, ointments or oils to your incision until cleared by your surgeon. Do not soak your incision in sitting water (ie tubs, pools, lakes, etc.) until cleared by your surgeon. You may let clean, running water fall over your incision.  - For any questions or concerns regarding incision or dressing care, call your surgeon's office.  ____________________  MEDICATION/ANTICOAGULATION:  - You have been prescribed Aspirin 81mg twice daily as a preventative to help prevent postoperative blood clots. Please take this medication as prescribed.   - You have been prescribed medications for pain:    - Tylenol for mild to moderate pain. Do not exceed 3,000mg daily.    - Narcotic pain medication. For more severe pain, take Tylenol with the addition of narcotic pain medication. Take this medication as prescribed. This medication may cause drowsiness or dizziness. Do not operate machinery. This medication may cause constipation.  - Try to have regular bowel movements. Take stool softener or laxative if necessary. You may wish to take Miralax (purchased over the counter) daily until you have regular bowel movements.   - GI protection. Please take Pantoprazole once a day, before breakfast, until no longer taking Aspirin, blood thinner, or anti-inflammatory. This will help protect your stomach.  - For any additional medications, follow instructions on the bottle.   - If you have a pain management physician, please follow-up with them postoperatively.   - If you experience any negative side effects of your medications, please call your surgeon's office to discuss.  ____________________  Follow-up:  - Call to schedule an appt with Dr. Jeffrey within 2 weeks of surgery for follow up. If you have staples or sutures they will be removed in office.  - Please follow-up with your primary care physician or any other specialist you see postoperatively, if needed.   - Contact your doctor if you experience: fever greater than 101.5, chills, chest pain, difficulty breathing, redness or excessive drainage around the incision, other concerns.   ACTIVITY:  - Weightbear as tolerated with assistive device and knee immobilizer x 2 weeks. No strenuous activity, heavy lifting, driving or returning to work until cleared by MD.  - You may experience postoperative swelling on the operative extremity. You may ice the surgery site for 20 minute intervals every hour.  ____________________  DRESSING:   - Keep your incision clean and dry. Do not pick at your incision. Do not apply creams, ointments or oils to your incision until cleared by your surgeon. Do not soak your incision in sitting water (ie tubs, pools, lakes, etc.) until cleared by your surgeon. You may let clean, running water fall over your incision.  - For any questions or concerns regarding incision or dressing care, call your surgeon's office.  ____________________  MEDICATION/ANTICOAGULATION:  - You have been prescribed Aspirin 81mg twice daily as a preventative to help prevent postoperative blood clots. Please take this medication as prescribed.   - You have been prescribed medications for pain:    - Tylenol for mild to moderate pain. Do not exceed 3,000mg daily.    - Narcotic pain medication. For more severe pain, take Tylenol with the addition of narcotic pain medication. Take this medication as prescribed. This medication may cause drowsiness or dizziness. Do not operate machinery. This medication may cause constipation.  - Try to have regular bowel movements. Take stool softener or laxative if necessary. You may wish to take Miralax (purchased over the counter) daily until you have regular bowel movements.   - GI protection. Please take Pantoprazole once a day, before breakfast, until no longer taking Aspirin, blood thinner, or anti-inflammatory. This will help protect your stomach.  - For any additional medications, follow instructions on the bottle.   - If you have a pain management physician, please follow-up with them postoperatively.   - If you experience any negative side effects of your medications, please call your surgeon's office to discuss.  ____________________  Follow-up:  - Call to schedule an appt with Dr. Jeffrey within 2 weeks of surgery for follow up. If you have staples or sutures they will be removed in office.  - Please follow-up with your primary care physician or any other specialist you see postoperatively, if needed.   - Contact your doctor if you experience: fever greater than 101.5, chills, chest pain, difficulty breathing, redness or excessive drainage around the incision, other concerns.   ACTIVITY:  - Weightbear as tolerated with assistive device and knee immobilizer x 2 weeks. No strenuous activity, heavy lifting, driving or returning to work until cleared by MD.  - You may experience postoperative swelling on the operative extremity. You may ice the surgery site for 20 minute intervals every hour.  ____________________  DRESSING:   - Keep your incision clean and dry. Do not pick at your incision. Do not apply creams, ointments or oils to your incision until cleared by your surgeon. Do not soak your incision in sitting water (ie tubs, pools, lakes, etc.) until cleared by your surgeon. You may let clean, running water fall over your incision.  - For any questions or concerns regarding incision or dressing care, call your surgeon's office.  ____________________  MEDICATION/ANTICOAGULATION:  - You have been prescribed Aspirin 81mg twice daily as a preventative to help prevent postoperative blood clots. Please take this medication as prescribed.   - You have been prescribed medications for pain:    - Tylenol for mild to moderate pain. Do not exceed 3,000mg daily.    - Narcotic pain medication. For more severe pain, take Tylenol with the addition of narcotic pain medication. Take this medication as prescribed. This medication may cause drowsiness or dizziness. Do not operate machinery. This medication may cause constipation.  - Try to have regular bowel movements. Take stool softener or laxative if necessary. You may wish to take Miralax (purchased over the counter) daily until you have regular bowel movements.   - GI protection. Please take Pantoprazole once a day, before breakfast, until no longer taking Aspirin, blood thinner, or anti-inflammatory. This will help protect your stomach.  - For any additional medications, follow instructions on the bottle.   - If you have a pain management physician, please follow-up with them postoperatively.   - If you experience any negative side effects of your medications, please call your surgeon's office to discuss.  ____________________  Follow-up:  - Call to schedule an appt with Dr. Jeffrey within 2 weeks of surgery for follow up. If you have staples or sutures they will be removed in office.  - Please follow-up with your primary care physician or any other specialist you see postoperatively, if needed.   - Contact your doctor if you experience: fever greater than 101.5, chills, chest pain, difficulty breathing, redness or excessive drainage around the incision, other concerns.    Your hemoglobin was low after surgery.   Follow up with [facility] PCP in 1 week to check your Hemoglobin level, draw a cbc ACTIVITY:  - Weightbear as tolerated with assistive device and knee immobilizer x 2 weeks. No strenuous activity, heavy lifting, driving or returning to work until cleared by MD.  - You may experience postoperative swelling on the operative extremity. You may ice the surgery site for 20 minute intervals every hour.  ____________________  DRESSING:   - Keep your incision clean and dry. Do not pick at your incision. Do not apply creams, ointments or oils to your incision until cleared by your surgeon. Do not soak your incision in sitting water (ie tubs, pools, lakes, etc.) until cleared by your surgeon. You may let clean, running water fall over your incision.  - For any questions or concerns regarding incision or dressing care, call your surgeon's office.  ____________________  MEDICATION/ANTICOAGULATION:  - You have been prescribed Aspirin 81mg twice daily as a preventative to help prevent postoperative blood clots. Please take this medication as prescribed.   - You have been prescribed medications for pain:    - Tylenol for mild to moderate pain. Do not exceed 3,000mg daily.    - Narcotic pain medication. For more severe pain, take Tylenol with the addition of narcotic pain medication. Take this medication as prescribed. This medication may cause drowsiness or dizziness. Do not operate machinery. This medication may cause constipation.  - Try to have regular bowel movements. Take stool softener or laxative if necessary. You may wish to take Miralax (purchased over the counter) daily until you have regular bowel movements.   - GI protection. Please take Pantoprazole once a day, before breakfast, until no longer taking Aspirin, blood thinner, or anti-inflammatory. This will help protect your stomach.  - For any additional medications, follow instructions on the bottle.   - If you have a pain management physician, please follow-up with them postoperatively.   - If you experience any negative side effects of your medications, please call your surgeon's office to discuss.  ____________________  Follow-up:  - Call to schedule an appt with Dr. Jeffrey within 2 weeks of surgery for follow up. If you have staples or sutures they will be removed in office.  - Please follow-up with your primary care physician or any other specialist you see postoperatively, if needed.   - Contact your doctor if you experience: fever greater than 101.5, chills, chest pain, difficulty breathing, redness or excessive drainage around the incision, other concerns.    Your hemoglobin was low after surgery.   Follow up with [facility] PCP in 1 week to check your Hemoglobin level, draw a cbc ACTIVITY:  - Weightbear as tolerated with assistive device and knee immobilizer x 2 weeks. No strenuous activity, heavy lifting, driving or returning to work until cleared by MD.  - You may experience postoperative swelling on the operative extremity. You may ice the surgery site for 20 minute intervals every hour.  ____________________  DRESSING:   - Keep your incision clean and dry. Do not pick at your incision. Do not apply creams, ointments or oils to your incision until cleared by your surgeon. Do not soak your incision in sitting water (ie tubs, pools, lakes, etc.) until cleared by your surgeon. You may let clean, running water fall over your incision.  - For any questions or concerns regarding incision or dressing care, call your surgeon's office.  ____________________  MEDICATION/ANTICOAGULATION:  - You have been prescribed Aspirin 81mg twice daily as a preventative to help prevent postoperative blood clots. Please take this medication as prescribed.   - You have been prescribed medications for pain:    - Tylenol for mild to moderate pain. Do not exceed 3,000mg daily.    - Narcotic pain medication. For more severe pain, take Tylenol with the addition of narcotic pain medication. Take this medication as prescribed. This medication may cause drowsiness or dizziness. Do not operate machinery. This medication may cause constipation.  - Try to have regular bowel movements. Take stool softener or laxative if necessary. You may wish to take Miralax (purchased over the counter) daily until you have regular bowel movements.   - GI protection. Please take Pantoprazole once a day, before breakfast, until no longer taking Aspirin, blood thinner, or anti-inflammatory. This will help protect your stomach.  - For any additional medications, follow instructions on the bottle.   - If you have a pain management physician, please follow-up with them postoperatively.   - If you experience any negative side effects of your medications, please call your surgeon's office to discuss.  ____________________  Follow-up:  - Call to schedule an appt with Dr. Jeffrey within 2 weeks of surgery for follow up. If you have staples or sutures they will be removed in office.  - Please follow-up with your primary care physician or any other specialist you see postoperatively, if needed.   - Contact your doctor if you experience: fever greater than 101.5, chills, chest pain, difficulty breathing, redness or excessive drainage around the incision, other concerns.    Your hemoglobin was low after surgery. You refused blood transfusions, but were asymptomatic.  Follow up with your PCP in 1 week to check your Hemoglobin level, draw a cbc. If you have symptoms of further worsening acute blood loss anemia, such as dizziness, fatigue, shortness of breath, you should return to your nearest ED for assessment. ACTIVITY:  - Weightbear as tolerated with assistive device and knee immobilizer x 2 weeks (until 1/15) Then may remove KI as tolerated. No strenuous activity, heavy lifting, driving or returning to work until cleared by MD.  - You may experience postoperative swelling on the operative extremity. You may ice the surgery site for 20 minute intervals every hour.  ____________________  DRESSING:   - Keep your incision clean and dry. Do not pick at your incision. Do not apply creams, ointments or oils to your incision until cleared by your surgeon. Do not soak your incision in sitting water (ie tubs, pools, lakes, etc.) until cleared by your surgeon. You may let clean, running water fall over your incision.  - For any questions or concerns regarding incision or dressing care, call your surgeon's office.  ____________________  MEDICATION/ANTICOAGULATION:  - You have been prescribed Aspirin 81mg twice daily as a preventative to help prevent postoperative blood clots. Please take this medication as prescribed.   - You have been prescribed medications for pain:    - Tylenol for mild to moderate pain. Do not exceed 3,000mg daily.    - Narcotic pain medication. For more severe pain, take Tylenol with the addition of narcotic pain medication. Take this medication as prescribed. This medication may cause drowsiness or dizziness. Do not operate machinery. This medication may cause constipation.  - Try to have regular bowel movements. Take stool softener or laxative if necessary. You may wish to take Miralax (purchased over the counter) daily until you have regular bowel movements.   - GI protection. Please take Pantoprazole once a day, before breakfast, until no longer taking Aspirin, blood thinner, or anti-inflammatory. This will help protect your stomach.  - For any additional medications, follow instructions on the bottle.   - If you have a pain management physician, please follow-up with them postoperatively.   - If you experience any negative side effects of your medications, please call your surgeon's office to discuss.  ____________________  Follow-up:  - Call to schedule an appt with Dr. Jeffrey at the [facility] within 2-3 weeks of surgery for follow up. If you have staples or sutures they will be removed in office. The Resident clinic sees patients on Thursday afternoons. It is recommended you schedule follow up for 1/18/24  - Please follow-up with your primary care physician or any other specialist you see postoperatively, if needed.   - Contact your doctor if you experience: fever greater than 101.5, chills, chest pain, difficulty breathing, redness or excessive drainage around the incision, other concerns.    Your hemoglobin was low after surgery. You refused blood transfusions, but were asymptomatic.  Follow up with your PCP in 1 week to check your Hemoglobin level, draw a cbc. If you have symptoms of further worsening acute blood loss anemia, such as dizziness, fatigue, shortness of breath, you should return to your nearest ED for assessment. ACTIVITY:  - Weightbear as tolerated with assistive device and knee immobilizer x 2 weeks (until 1/15) Then may remove KI as tolerated. No strenuous activity, heavy lifting, driving or returning to work until cleared by MD.  - You may experience postoperative swelling on the operative extremity. You may ice the surgery site for 20 minute intervals every hour.  ____________________  DRESSING:   - Keep your incision clean and dry. Do not pick at your incision. Do not apply creams, ointments or oils to your incision until cleared by your surgeon. Do not soak your incision in sitting water (ie tubs, pools, lakes, etc.) until cleared by your surgeon. You may let clean, running water fall over your incision.  - For any questions or concerns regarding incision or dressing care, call your surgeon's office.  ____________________  MEDICATION/ANTICOAGULATION:  - You have been prescribed Aspirin 81mg twice daily as a preventative to help prevent postoperative blood clots. Please take this medication as prescribed.   - You have been prescribed medications for pain:    - Tylenol for mild to moderate pain. Do not exceed 3,000mg daily.    - Narcotic pain medication. For more severe pain, take Tylenol with the addition of narcotic pain medication. Take this medication as prescribed. This medication may cause drowsiness or dizziness. Do not operate machinery. This medication may cause constipation.  - Try to have regular bowel movements. Take stool softener or laxative if necessary. You may wish to take Miralax (purchased over the counter) daily until you have regular bowel movements.   - GI protection. Please take Pantoprazole once a day, before breakfast, until no longer taking Aspirin, blood thinner, or anti-inflammatory. This will help protect your stomach.  - For any additional medications, follow instructions on the bottle.   - If you have a pain management physician, please follow-up with them postoperatively.   - If you experience any negative side effects of your medications, please call your surgeon's office to discuss.  ____________________  Follow-up:  - Call to schedule an appt with Dr. Jeffrey at the [facility] within 2-3 weeks of surgery for follow up. If you have staples or sutures they will be removed in office. The Resident clinic sees patients on Thursday afternoons. It is recommended you schedule follow up for 1/18/24  - Please follow-up with your primary care physician or any other specialist you see postoperatively, if needed.   - Contact your doctor if you experience: fever greater than 101.5, chills, chest pain, difficulty breathing, redness or excessive drainage around the incision, other concerns.    Your hemoglobin was low after surgery. You refused blood transfusions, but were asymptomatic.  Follow up with your PCP in 1 week to check your Hemoglobin level, draw a cbc. If you have symptoms of further worsening acute blood loss anemia, such as dizziness, fatigue, shortness of breath, you should return to your nearest ED for assessment. ACTIVITY:  - Weightbear as tolerated with assistive device and knee immobilizer x 2 weeks (until 1/15) Then may remove KI as tolerated. No strenuous activity, heavy lifting, driving or returning to work until cleared by MD.  - You may experience postoperative swelling on the operative extremity. You may ice the surgery site for 20 minute intervals every hour.  ____________________  DRESSING:   (AQUACEL – brown gel dressing)   - You may shower, your dressing is water-resistant. Do not soak in bathtubs. Remove dressing after postop day 7, then leave incision open to air. You may do this yourself (simply peel it off), or you may ask for assistance from your visiting nurse. Keep your incision clean and dry. Do not pick at your incision. Do not apply creams, ointments or oils to your incision until cleared by your surgeon. Do not soak your incision in sitting water (ie tubs, pools, lakes, etc.) until cleared by your surgeon. Do not scrub the incision – instead, allow soap and water to flow over the incision and then pat it dry with a clean towel.  - For any questions or concerns regarding incision or dressing care, call your surgeon's office.  ____________________  MEDICATION/ANTICOAGULATION:  - You have been prescribed Aspirin 81mg twice daily as a preventative to help prevent postoperative blood clots. Please take this medication as prescribed.   - You have been prescribed medications for pain:    - Tylenol for mild to moderate pain. Do not exceed 3,000mg daily.    - Narcotic pain medication. For more severe pain, take Tylenol with the addition of narcotic pain medication. Take this medication as prescribed. This medication may cause drowsiness or dizziness. Do not operate machinery. This medication may cause constipation.  - Try to have regular bowel movements. Take stool softener or laxative if necessary. You may wish to take Miralax (purchased over the counter) daily until you have regular bowel movements.   - GI protection. Please take Pantoprazole once a day, before breakfast, until no longer taking Aspirin, blood thinner, or anti-inflammatory. This will help protect your stomach.  - For any additional medications, follow instructions on the bottle.   - If you have a pain management physician, please follow-up with them postoperatively.   - If you experience any negative side effects of your medications, please call your surgeon's office to discuss.  ____________________  Follow-up:  - Call to schedule an appt with Dr. Jeffrey at the [facility] within 2-3 weeks of surgery for follow up. If you have staples or sutures they will be removed in office. The Resident clinic sees patients on Thursday afternoons. It is recommended you schedule follow up for 1/18/24  - Please follow-up with your primary care physician or any other specialist you see postoperatively, if needed.   - Contact your doctor if you experience: fever greater than 101.5, chills, chest pain, difficulty breathing, redness or excessive drainage around the incision, other concerns.    Your hemoglobin was low after surgery. You refused blood transfusions, but were asymptomatic.  Follow up with your PCP in 1 week to check your Hemoglobin level, draw a cbc. If you have symptoms of further worsening acute blood loss anemia, such as dizziness, fatigue, shortness of breath, you should return to your nearest ED for assessment. ACTIVITY:  - Weightbear as tolerated with assistive device and knee immobilizer x 2 weeks (until 1/15) Then may remove KI as tolerated. No strenuous activity, heavy lifting, driving or returning to work until cleared by MD.  - You may experience postoperative swelling on the operative extremity. You may ice the surgery site for 20 minute intervals every hour.  ____________________  DRESSING:   (AQUACEL – brown gel dressing)   - You may shower, your dressing is water-resistant. Do not soak in bathtubs. Remove dressing after postop day 7, then leave incision open to air. You may do this yourself (simply peel it off), or you may ask for assistance from your visiting nurse. Keep your incision clean and dry. Do not pick at your incision. Do not apply creams, ointments or oils to your incision until cleared by your surgeon. Do not soak your incision in sitting water (ie tubs, pools, lakes, etc.) until cleared by your surgeon. Do not scrub the incision – instead, allow soap and water to flow over the incision and then pat it dry with a clean towel.  - For any questions or concerns regarding incision or dressing care, call your surgeon's office.  ____________________  MEDICATION/ANTICOAGULATION:  - You have been prescribed Aspirin 81mg twice daily as a preventative to help prevent postoperative blood clots. Please take this medication as prescribed.   - You have been prescribed medications for pain:    - Tylenol for mild to moderate pain. Do not exceed 3,000mg daily.    - Narcotic pain medication. For more severe pain, take Tylenol with the addition of narcotic pain medication. Take this medication as prescribed. This medication may cause drowsiness or dizziness. Do not operate machinery. This medication may cause constipation.  - Try to have regular bowel movements. Take stool softener or laxative if necessary. You may wish to take Miralax (purchased over the counter) daily until you have regular bowel movements.   - GI protection. Please take Pantoprazole once a day, before breakfast, until no longer taking Aspirin, blood thinner, or anti-inflammatory. This will help protect your stomach.  - For any additional medications, follow instructions on the bottle.   - If you have a pain management physician, please follow-up with them postoperatively.   - If you experience any negative side effects of your medications, please call your surgeon's office to discuss.  ____________________  Follow-up:  - Call to schedule an appt with Dr. Jeffrey at the [facility] within 2-3 weeks of surgery for follow up. If you have staples or sutures they will be removed in office. The Resident clinic sees patients on Thursday afternoons. It is recommended you schedule follow up for 1/18/24  - Please follow-up with your primary care physician or any other specialist you see postoperatively, if needed.   - Contact your doctor if you experience: fever greater than 101.5, chills, chest pain, difficulty breathing, redness or excessive drainage around the incision, other concerns.    Your hemoglobin was low after surgery. You refused blood transfusions, but were asymptomatic.  Follow up with your PCP in 1 week to check your Hemoglobin level, draw a cbc. If you have symptoms of further worsening acute blood loss anemia, such as dizziness, fatigue, shortness of breath, you should return to your nearest ED for assessment.

## 2024-01-02 NOTE — DISCHARGE NOTE PROVIDER - HOSPITAL COURSE
Admitted: 1/1 to Ortho service via ED  Surgery: 1/1 right IM nail  Lissette-op Antibiotics: Ancef  Pain control  DVT prophylaxis: SCDs, ASA 81mg twice daily  OOB/Physical Therapy  Consultants: Medicine  Inpatient events:  1/1: Medicine consulted and cleared for surgery   Admitted: 1/1 to Ortho service via ED  Surgery: 1/1 right IM nail  Lissette-op Antibiotics: Ancef  Pain control  DVT prophylaxis: SCDs, ASA 81mg twice daily  OOB/Physical Therapy  Consultants: Medicine  Inpatient events:  1/1: Medicine consulted and cleared for surgery  1/3: Hgb 7.0 Post op acute blood loss anemia: patient refusing blood transfusions, vitals remain stable   1/4: Repeat Hgb 7.0, patient still refusing blood transfusions, will continue to monitor    Admitted: 1/1 to Ortho service via ED  Surgery: 1/1 right IM nail  Lissette-op Antibiotics: Ancef  Pain control  DVT prophylaxis: SCDs, ASA 81mg twice daily  OOB/Physical Therapy  Consultants: Medicine  Inpatient events:  1/1: Medicine consulted and cleared for surgery  1/3: Hgb 7.0 Post op acute blood loss anemia: patient refusing blood transfusions, vitals remain stable   1/4: Repeat Hgb 7.0, patient still refusing blood transfusions, will continue to monitor   1-5-2024- still refusing PT   Admitted: 1/1 to Ortho service via ED  Surgery: 1/1 right IM nail  Lissette-op Antibiotics: Ancef  Pain control  DVT prophylaxis: SCDs, ASA 81mg twice daily  OOB/Physical Therapy  Consultants: Medicine  Inpatient events:  1/1: Medicine consulted and cleared for surgery  1/3: Hgb 7.0 Post op acute blood loss anemia: patient refusing blood transfusions, vitals remain stable   1/4: Repeat Hgb 7.0, patient still refusing blood transfusions, will continue to monitor   1-5-2024- still refusing PT  1/6: Hgb 6.9- has been stable. As per medicine can be dc with Hgb as long as stable because continuing to refuse blood transfusions.   Admitted: 1/1 to Ortho service via ED  Surgery: 1/1 right IM nail  Lissette-op Antibiotics: Ancef  Pain control  DVT prophylaxis: SCDs, ASA 81mg twice daily  OOB/Physical Therapy  Consultants: Medicine  Inpatient events:  1/1: Medicine consulted and cleared for surgery  1/3: Hgb 7.0 Post op acute blood loss anemia: patient refusing blood transfusions, vitals remain stable   1/4: Repeat Hgb 7.0, patient still refusing blood transfusions, will continue to monitor   1-5-2024- still refusing PT  1/6: Hgb 6.9- has been stable, VSS, patient refused all blood transfusions.  1/8: Hgb 8.3. Medically stable for d/c to rehab

## 2024-01-02 NOTE — PHYSICAL THERAPY INITIAL EVALUATION ADULT - PERTINENT HX OF CURRENT PROBLEM, REHAB EVAL
78y Female PSH L TKA 2016 w/ KECIA Allen forfoot reconstruction Dr. Boswell 2021, presents with RIGHT // distal thigh //s/p mechanical fall. Denies numbness/tingling in the affected extremity. Endorses head strike/ no LOC/other orthopedic injuries at this time. Patient ambulates without assistance  at baseline.

## 2024-01-02 NOTE — PROGRESS NOTE ADULT - SUBJECTIVE AND OBJECTIVE BOX
Ortho Note    Surgery: Right femur retrograde nail POD #1    Patient seen and examined at bedside this AM   Pt comfortable without complaints, pain controlled  Denies CP, SOB, N/V, numbness/tingling     Vital Signs Last 24 Hrs  T(C): 36.4 (02 Jan 2024 08:57), Max: 36.6 (02 Jan 2024 05:37)  T(F): 97.6 (02 Jan 2024 08:57), Max: 97.8 (02 Jan 2024 05:37)  HR: 98 (02 Jan 2024 08:57) (73 - 98)  BP: 117/58 (02 Jan 2024 08:57) (114/64 - 129/61)  BP(mean): --  RR: 17 (02 Jan 2024 08:57) (17 - 18)  SpO2: 95% (02 Jan 2024 08:57) (95% - 100%)    Parameters below as of 02 Jan 2024 08:57  Patient On (Oxygen Delivery Method): room air        General: Pt Alert and oriented, NAD  Dressing C/D/I: Right lower extremity ace wrap, KI in place   extremity warm and well perfused   Sensation: intact to light touch right lower extremity   Motor: EHL/FHL/TA/GS 5/5 bilateral LE         A/P: 78yFemale POD#1 s/p Right femur retrograde nail     - Hgb 7.8, vitals stable, will trend   - Pain Control as needed  - DVT ppx: Aspirin 81mg BID   - PT, WBS: WBAT in KI for 2 weeks   - Dispo: pending [facility]     Ortho Pager [phone] Ortho Note    Surgery: Right femur retrograde nail POD #1    Patient seen and examined at bedside this AM   Pt comfortable without complaints, pain controlled  Denies CP, SOB, N/V, numbness/tingling     Vital Signs Last 24 Hrs  T(C): 36.4 (02 Jan 2024 08:57), Max: 36.6 (02 Jan 2024 05:37)  T(F): 97.6 (02 Jan 2024 08:57), Max: 97.8 (02 Jan 2024 05:37)  HR: 98 (02 Jan 2024 08:57) (73 - 98)  BP: 117/58 (02 Jan 2024 08:57) (114/64 - 129/61)  BP(mean): --  RR: 17 (02 Jan 2024 08:57) (17 - 18)  SpO2: 95% (02 Jan 2024 08:57) (95% - 100%)    Parameters below as of 02 Jan 2024 08:57  Patient On (Oxygen Delivery Method): room air        General: Pt Alert and oriented, NAD  Dressing C/D/I: Right lower extremity ace wrap, KI in place   extremity warm and well perfused   Sensation: intact to light touch right lower extremity   Motor: EHL/FHL/TA/GS 5/5 bilateral LE         A/P: 78yFemale POD#1 s/p Right femur retrograde nail     - Hgb 7.8, vitals stable, will trend   - Pain Control as needed  - DVT ppx: Aspirin 81mg BID   - PT, WBS: WBAT in KI for 2 weeks   - Dispo: pending [facility]     Ortho Pager [phone]

## 2024-01-02 NOTE — DISCHARGE NOTE PROVIDER - NSDCACTIVITY_GEN_ALL_CORE
Walking - Indoors allowed/Follow Instructions Provided by your Surgical Team Do not drive or operate machinery/Showering allowed/Walking - Indoors allowed/Follow Instructions Provided by your Surgical Team

## 2024-01-02 NOTE — PHYSICAL THERAPY INITIAL EVALUATION ADULT - MANUAL MUSCLE TESTING RESULTS, REHAB EVAL
Grossly assessed with functional movement, bilateral UE/LE greater than or equal to 3+/5 // R ankle PF/DF 5/5

## 2024-01-02 NOTE — DISCHARGE NOTE PROVIDER - PROVIDER TOKENS
PROVIDER:[TOKEN:[[id number]],FOLLOWUP:[2 weeks]] PROVIDER:[TOKEN:[[id number]],FOLLOWUP:[2 weeks]]

## 2024-01-02 NOTE — PROGRESS NOTE ADULT - ATTENDING COMMENTS
79 yo F with PMHx HLD, chronic LE edema, hx L-TKA (2016), L forefoot reconstruction (2021) BIBEMS from home s/p mechanical fall found to have R distal femur fx admitted to orthopedic surgery with plan for OR. Medicine consulted for pre-operative clearance.      #Pre-operative clearance – CBC/CMP largely within normal limits apart from mild anemia to Hb 12.2. METS >4. No significant cardiac/pulmonary history apart from HTN/HLD although CXR with cardiomegaly. TTE 6/2023 showing LVEF 54%, mild LVH, mild MR. No adverse reactions to anesthesia in the past in self or first-degree relatives. RCRI 0 points (Class I Risk) ~ 3.9% for 30d risk of death, MI, or cardiac arrest. Adame score 0.2% for risk of MI or cardiac arrest, intraoperatively or up to 30d post-op. Low-intermediate risk for intermediate-risk procedure.     #LE edema - Hold spironolactone janay-operatively.    Agree with remainder of resident plan as above. 77 yo F with PMHx HLD, chronic LE edema, hx L-TKA (2016), L forefoot reconstruction (2021) BIBEMS from home s/p mechanical fall found to have R distal femur fx admitted to orthopedic surgery with plan for OR. Medicine consulted for pre-operative clearance.      #Pre-operative clearance – CBC/CMP largely within normal limits apart from mild anemia to Hb 12.2. METS >4. No significant cardiac/pulmonary history apart from HTN/HLD although CXR with cardiomegaly. TTE 6/2023 showing LVEF 54%, mild LVH, mild MR. No adverse reactions to anesthesia in the past in self or first-degree relatives. RCRI 0 points (Class I Risk) ~ 3.9% for 30d risk of death, MI, or cardiac arrest. Adame score 0.2% for risk of MI or cardiac arrest, intraoperatively or up to 30d post-op. Low-intermediate risk for intermediate-risk procedure.     #LE edema - Hold spironolactone janay-operatively.    Agree with remainder of resident plan as above.

## 2024-01-03 LAB
ANION GAP SERPL CALC-SCNC: 7 MMOL/L — SIGNIFICANT CHANGE UP (ref 5–17)
ANION GAP SERPL CALC-SCNC: 7 MMOL/L — SIGNIFICANT CHANGE UP (ref 5–17)
BUN SERPL-MCNC: 16 MG/DL — SIGNIFICANT CHANGE UP (ref 7–23)
BUN SERPL-MCNC: 16 MG/DL — SIGNIFICANT CHANGE UP (ref 7–23)
CALCIUM SERPL-MCNC: 8.4 MG/DL — SIGNIFICANT CHANGE UP (ref 8.4–10.5)
CALCIUM SERPL-MCNC: 8.4 MG/DL — SIGNIFICANT CHANGE UP (ref 8.4–10.5)
CHLORIDE SERPL-SCNC: 104 MMOL/L — SIGNIFICANT CHANGE UP (ref 96–108)
CHLORIDE SERPL-SCNC: 104 MMOL/L — SIGNIFICANT CHANGE UP (ref 96–108)
CO2 SERPL-SCNC: 25 MMOL/L — SIGNIFICANT CHANGE UP (ref 22–31)
CO2 SERPL-SCNC: 25 MMOL/L — SIGNIFICANT CHANGE UP (ref 22–31)
CREAT SERPL-MCNC: 0.86 MG/DL — SIGNIFICANT CHANGE UP (ref 0.5–1.3)
CREAT SERPL-MCNC: 0.86 MG/DL — SIGNIFICANT CHANGE UP (ref 0.5–1.3)
EGFR: 68 ML/MIN/1.73M2 — SIGNIFICANT CHANGE UP
EGFR: 68 ML/MIN/1.73M2 — SIGNIFICANT CHANGE UP
GLUCOSE SERPL-MCNC: 138 MG/DL — HIGH (ref 70–99)
GLUCOSE SERPL-MCNC: 138 MG/DL — HIGH (ref 70–99)
HCT VFR BLD CALC: 20.3 % — CRITICAL LOW (ref 34.5–45)
HCT VFR BLD CALC: 20.3 % — CRITICAL LOW (ref 34.5–45)
HCT VFR BLD CALC: 21.1 % — LOW (ref 34.5–45)
HCT VFR BLD CALC: 21.1 % — LOW (ref 34.5–45)
HGB BLD-MCNC: 6.6 G/DL — CRITICAL LOW (ref 11.5–15.5)
HGB BLD-MCNC: 6.6 G/DL — CRITICAL LOW (ref 11.5–15.5)
HGB BLD-MCNC: 7 G/DL — CRITICAL LOW (ref 11.5–15.5)
HGB BLD-MCNC: 7 G/DL — CRITICAL LOW (ref 11.5–15.5)
MCHC RBC-ENTMCNC: 28.4 PG — SIGNIFICANT CHANGE UP (ref 27–34)
MCHC RBC-ENTMCNC: 28.4 PG — SIGNIFICANT CHANGE UP (ref 27–34)
MCHC RBC-ENTMCNC: 28.8 PG — SIGNIFICANT CHANGE UP (ref 27–34)
MCHC RBC-ENTMCNC: 28.8 PG — SIGNIFICANT CHANGE UP (ref 27–34)
MCHC RBC-ENTMCNC: 32.5 GM/DL — SIGNIFICANT CHANGE UP (ref 32–36)
MCHC RBC-ENTMCNC: 32.5 GM/DL — SIGNIFICANT CHANGE UP (ref 32–36)
MCHC RBC-ENTMCNC: 33.2 GM/DL — SIGNIFICANT CHANGE UP (ref 32–36)
MCHC RBC-ENTMCNC: 33.2 GM/DL — SIGNIFICANT CHANGE UP (ref 32–36)
MCV RBC AUTO: 86.8 FL — SIGNIFICANT CHANGE UP (ref 80–100)
MCV RBC AUTO: 86.8 FL — SIGNIFICANT CHANGE UP (ref 80–100)
MCV RBC AUTO: 87.5 FL — SIGNIFICANT CHANGE UP (ref 80–100)
MCV RBC AUTO: 87.5 FL — SIGNIFICANT CHANGE UP (ref 80–100)
NRBC # BLD: 0 /100 WBCS — SIGNIFICANT CHANGE UP (ref 0–0)
PLATELET # BLD AUTO: 160 K/UL — SIGNIFICANT CHANGE UP (ref 150–400)
PLATELET # BLD AUTO: 160 K/UL — SIGNIFICANT CHANGE UP (ref 150–400)
PLATELET # BLD AUTO: 163 K/UL — SIGNIFICANT CHANGE UP (ref 150–400)
PLATELET # BLD AUTO: 163 K/UL — SIGNIFICANT CHANGE UP (ref 150–400)
POTASSIUM SERPL-MCNC: 3.7 MMOL/L — SIGNIFICANT CHANGE UP (ref 3.5–5.3)
POTASSIUM SERPL-MCNC: 3.7 MMOL/L — SIGNIFICANT CHANGE UP (ref 3.5–5.3)
POTASSIUM SERPL-SCNC: 3.7 MMOL/L — SIGNIFICANT CHANGE UP (ref 3.5–5.3)
POTASSIUM SERPL-SCNC: 3.7 MMOL/L — SIGNIFICANT CHANGE UP (ref 3.5–5.3)
RBC # BLD: 2.32 M/UL — LOW (ref 3.8–5.2)
RBC # BLD: 2.32 M/UL — LOW (ref 3.8–5.2)
RBC # BLD: 2.43 M/UL — LOW (ref 3.8–5.2)
RBC # BLD: 2.43 M/UL — LOW (ref 3.8–5.2)
RBC # FLD: 14.3 % — SIGNIFICANT CHANGE UP (ref 10.3–14.5)
RBC # FLD: 14.3 % — SIGNIFICANT CHANGE UP (ref 10.3–14.5)
RBC # FLD: 14.5 % — SIGNIFICANT CHANGE UP (ref 10.3–14.5)
RBC # FLD: 14.5 % — SIGNIFICANT CHANGE UP (ref 10.3–14.5)
SODIUM SERPL-SCNC: 136 MMOL/L — SIGNIFICANT CHANGE UP (ref 135–145)
SODIUM SERPL-SCNC: 136 MMOL/L — SIGNIFICANT CHANGE UP (ref 135–145)
WBC # BLD: 7.09 K/UL — SIGNIFICANT CHANGE UP (ref 3.8–10.5)
WBC # BLD: 7.09 K/UL — SIGNIFICANT CHANGE UP (ref 3.8–10.5)
WBC # BLD: 7.27 K/UL — SIGNIFICANT CHANGE UP (ref 3.8–10.5)
WBC # BLD: 7.27 K/UL — SIGNIFICANT CHANGE UP (ref 3.8–10.5)
WBC # FLD AUTO: 7.09 K/UL — SIGNIFICANT CHANGE UP (ref 3.8–10.5)
WBC # FLD AUTO: 7.09 K/UL — SIGNIFICANT CHANGE UP (ref 3.8–10.5)
WBC # FLD AUTO: 7.27 K/UL — SIGNIFICANT CHANGE UP (ref 3.8–10.5)
WBC # FLD AUTO: 7.27 K/UL — SIGNIFICANT CHANGE UP (ref 3.8–10.5)

## 2024-01-03 PROCEDURE — 99233 SBSQ HOSP IP/OBS HIGH 50: CPT

## 2024-01-03 RX ORDER — SPIRONOLACTONE 25 MG/1
25 TABLET, FILM COATED ORAL
Refills: 0 | Status: DISCONTINUED | OUTPATIENT
Start: 2024-01-03 | End: 2024-01-08

## 2024-01-03 RX ORDER — CALCIUM CARBONATE 500(1250)
1 TABLET ORAL EVERY 6 HOURS
Refills: 0 | Status: DISCONTINUED | OUTPATIENT
Start: 2024-01-03 | End: 2024-01-08

## 2024-01-03 RX ADMIN — Medication 1000 MILLIGRAM(S): at 22:05

## 2024-01-03 RX ADMIN — Medication 1000 MILLIGRAM(S): at 06:13

## 2024-01-03 RX ADMIN — Medication 1000 MILLIGRAM(S): at 14:09

## 2024-01-03 RX ADMIN — SPIRONOLACTONE 25 MILLIGRAM(S): 25 TABLET, FILM COATED ORAL at 17:03

## 2024-01-03 RX ADMIN — Medication 81 MILLIGRAM(S): at 05:18

## 2024-01-03 RX ADMIN — Medication 1 TABLET(S): at 11:38

## 2024-01-03 RX ADMIN — Medication 1000 MILLIGRAM(S): at 05:18

## 2024-01-03 RX ADMIN — ATORVASTATIN CALCIUM 10 MILLIGRAM(S): 80 TABLET, FILM COATED ORAL at 22:06

## 2024-01-03 RX ADMIN — Medication 81 MILLIGRAM(S): at 17:03

## 2024-01-03 RX ADMIN — Medication 1000 MILLIGRAM(S): at 23:05

## 2024-01-03 RX ADMIN — FAMOTIDINE 40 MILLIGRAM(S): 10 INJECTION INTRAVENOUS at 05:18

## 2024-01-03 RX ADMIN — Medication 1 TABLET(S): at 01:20

## 2024-01-03 RX ADMIN — Medication 1000 MILLIGRAM(S): at 14:39

## 2024-01-03 RX ADMIN — FAMOTIDINE 20 MILLIGRAM(S): 10 INJECTION INTRAVENOUS at 17:03

## 2024-01-03 NOTE — OCCUPATIONAL THERAPY INITIAL EVALUATION ADULT - MODIFIED CLINICAL TEST OF SENSORY INTEGRATION IN BALANCE TEST
Pt. able to tolerate 1 STS transfer training at this time 2/2 increased pain, continuing to decline further OOB even w. max education

## 2024-01-03 NOTE — CHART NOTE - NSCHARTNOTEFT_GEN_A_CORE
Patient refusing blood transfusion. Educated patient on the benefits and risks of blood transfusion as well as risks associated with not transfusing with a Hgb of 6.6. Strongly recommended patient to receive a unit of blood however patient still declined. Patient states she will reconsider transfusion after her CBC is checked again. Plan for STAT CBC and another discussion. Patient refusing blood transfusion. Educated patient on the benefits and risks of blood transfusion as well as risks associated with not transfusing with a Hgb of 6.6. Strongly recommended patient to receive a unit of blood however patient still declined. Patient states she will reconsider transfusion after her CBC is checked again. Patient denies CP/palpitations, dizziness, lightheadedness. Plan for STAT CBC and another discussion.

## 2024-01-03 NOTE — PROGRESS NOTE ADULT - SUBJECTIVE AND OBJECTIVE BOX
Patient is a 78y old  Female who presents with a chief complaint of R distal femur fx (02 Jan 2024 15:07)        SUBJECTIVE:  Patient was seen and examined at bedside.    Overnight Events : S/p ORIF on 1/1/24 , resting in bed , complaining of pain - Right Leg , no lightheadedness       Review of systems: 12 point Review of systems negative unless otherwise documented elsewhere in note.     Diet, Regular (01-01-24 @ 08:19) [Active]      MEDICATIONS:  MEDICATIONS  (STANDING):  acetaminophen     Tablet .. 1000 milliGRAM(s) Oral every 8 hours  aspirin  chewable 81 milliGRAM(s) Oral two times a day  atorvastatin 10 milliGRAM(s) Oral at bedtime  chlorhexidine 2% Cloths 1 Application(s) Topical <User Schedule>  famotidine    Tablet 40 milliGRAM(s) Oral two times a day  lactated ringers. 1000 milliLiter(s) (100 mL/Hr) IV Continuous <Continuous>  multivitamin 1 Tablet(s) Oral daily  polyethylene glycol 3350 17 Gram(s) Oral at bedtime  povidone iodine 5% Nasal Swab 1 Application(s) Both Nostrils once  senna 2 Tablet(s) Oral at bedtime  sodium chloride 0.9%. 1000 milliLiter(s) (125 mL/Hr) IV Continuous <Continuous>    MEDICATIONS  (PRN):  HYDROmorphone  Injectable 0.5 milliGRAM(s) IV Push every 15 minutes PRN breakthrough  HYDROmorphone  Injectable 0.5 milliGRAM(s) IV Push every 6 hours PRN breakthrough  magnesium hydroxide Suspension 30 milliLiter(s) Oral daily PRN Constipation  magnesium hydroxide Suspension 30 milliLiter(s) Oral daily PRN Constipation  ondansetron Injectable 4 milliGRAM(s) IV Push every 6 hours PRN Nausea and/or Vomiting  oxyCODONE    IR 10 milliGRAM(s) Oral every 4 hours PRN Severe Pain (7 - 10)  oxyCODONE    IR 5 milliGRAM(s) Oral every 4 hours PRN Moderate Pain (4 - 6)      Allergies    No Known Allergies    Intolerances        OBJECTIVE:  Vital Signs Last 24 Hrs  T(C): 36.8 (03 Jan 2024 10:25), Max: 36.8 (02 Jan 2024 20:20)  T(F): 98.3 (03 Jan 2024 10:25), Max: 98.3 (03 Jan 2024 10:25)  HR: 80 (03 Jan 2024 10:25) (73 - 86)  BP: 100/61 (03 Jan 2024 10:25) (100/61 - 112/64)  BP(mean): --  RR: 17 (03 Jan 2024 10:25) (17 - 18)  SpO2: 95% (03 Jan 2024 10:25) (95% - 97%)    Parameters below as of 03 Jan 2024 10:25  Patient On (Oxygen Delivery Method): room air            PHYSICAL EXAM:  Gen: Resting in bed at time of exam, not in distress   HEENT: moist mucosa, no lesions   Neck: supple, trachea at midline  CV: RRR, +S1/S2  Pulm: no wheezing , no crackles  no increase in work of breathing  Abd: soft, NTND  Skin: warm and dry, no new rashes   Ext:  Right leg and knee pain   Neuro: AOx3, no gross focal neurological deficits  Psych: affect and behavior appropriate, pleasant at time of interview    LABS:                                   7.0    7.27  )-----------( 163      ( 03 Jan 2024 09:09 )             21.1     01-03    136  |  104  |  16  ----------------------------<  138<H>  3.7   |  25  |  0.86    Ca    8.4      03 Jan 2024 05:30            MICRODATA:    Urinalysis with Rflx Culture (collected 01 Jan 2024 01:41)        RADIOLOGY/OTHER STUDIES:

## 2024-01-03 NOTE — PROGRESS NOTE ADULT - SUBJECTIVE AND OBJECTIVE BOX
Procedure:  R femur retrograde nail   Surgeon: Dr. LINDA Jeffrey    Pt comfortable without complaints, PT limited yday to sitting @ EOB, due to limited by RLE pain   Denies CP, SOB, N/V, numbness/tingling     Vital Signs Last 24 Hrs  T(C): 36.7 (03 Jan 2024 05:10), Max: 36.8 (02 Jan 2024 20:20)  T(F): 98.1 (03 Jan 2024 05:10), Max: 98.2 (02 Jan 2024 20:20)  HR: 86 (03 Jan 2024 05:10) (73 - 98)  BP: 112/64 (03 Jan 2024 05:10) (107/56 - 117/58)  BP(mean): --  RR: 17 (03 Jan 2024 05:10) (17 - 18)  SpO2: 97% (03 Jan 2024 05:10) (95% - 97%)    Parameters below as of 03 Jan 2024 05:10  Patient On (Oxygen Delivery Method): room air        General: Pt Alert and oriented, NAD  R leg dsg c/d/i, in KI   Pulses: 2+ DP  Sensation: SILT grossly SPN/DPN/Saph/Lizeth/Tib  Motor: 5/5 TA/GS/EHL,    A/P: 78yFemale s/p R femur retrograde nail by Dr. LINDA Jeffrey on 01-01  - Stable  - holding home spironolactone periop   - Pain Control  - DVT ppx: ASA   - Post op abx: Ancef  - WBS: WBAT  - PT eval - [facility]     Ortho Pager [phone]     Procedure:  R femur retrograde nail   Surgeon: Dr. LINDA Jeffrey    Pt comfortable without complaints, PT limited yday to sitting @ EOB, due to limited by RLE pain   Denies CP, SOB, N/V, numbness/tingling     Vital Signs Last 24 Hrs  T(C): 36.7 (03 Jan 2024 05:10), Max: 36.8 (02 Jan 2024 20:20)  T(F): 98.1 (03 Jan 2024 05:10), Max: 98.2 (02 Jan 2024 20:20)  HR: 86 (03 Jan 2024 05:10) (73 - 98)  BP: 112/64 (03 Jan 2024 05:10) (107/56 - 117/58)  BP(mean): --  RR: 17 (03 Jan 2024 05:10) (17 - 18)  SpO2: 97% (03 Jan 2024 05:10) (95% - 97%)    Parameters below as of 03 Jan 2024 05:10  Patient On (Oxygen Delivery Method): room air        General: Pt Alert and oriented, NAD  R leg dsg c/d/i, in KI   Pulses: 2+ DP  Sensation: SILT grossly SPN/DPN/Saph/Lizeth/Tib  Motor: 5/5 TA/GS/EHL,    A/P: 78yFemale s/p R femur retrograde nail by Dr. LINDA Jeffrey on 01-01  - Stable  - holding home spironolactone periop   - Pain Control  - DVT ppx: ASA   - Post op abx: Ancef  - WBS: WBAT  - PT eval - [facility]     Ortho Pager [phone]

## 2024-01-03 NOTE — OCCUPATIONAL THERAPY INITIAL EVALUATION ADULT - PERTINENT HX OF CURRENT PROBLEM, REHAB EVAL
78y Female PSH L TKA 2016 w/ KECIA Allen forfoot reconstruction Dr. Boswell 2021, presents with RIGHT // distal thigh //s/p mechanical fall. Denies numbness/tingling in the affected extremity. Endorses head strike/ no LOC/other orthopedic injuries at this time. Patient ambulates without assistance  at baseline. Patient does not take any anticoagulation/antiplatelet medications

## 2024-01-03 NOTE — OCCUPATIONAL THERAPY INITIAL EVALUATION ADULT - ADDITIONAL COMMENTS
Pt. reports residing in an elevator accessible building with her  where she was I prior in ADLs and functional mob/transfers. Pt. BR with tub/shower, +grab bars

## 2024-01-03 NOTE — OCCUPATIONAL THERAPY INITIAL EVALUATION ADULT - DIAGNOSIS, OT EVAL
Pt. is POD#2 (1/3/23) s/p right retrograde nail placement. Upon assessment, pt. demo increased pain as well as deficits in strength and balance impacting engagement in ADLs and functional mob/transfers

## 2024-01-03 NOTE — PROGRESS NOTE ADULT - SUBJECTIVE AND OBJECTIVE BOX
Ortho Note    Patient seen and examined at bedside. Pt comfortable without complaints, pain controlled. States she does not want a blood transfusion.   Denies CP, palpitations, lightheadedness/dizziness, SOB, N/V, new numbness/tingling     Vital Signs Last 24 Hrs  T(C): 36.8 (01-03-24 @ 10:25), Max: 36.8 (01-03-24 @ 10:25)  T(F): 98.3 (01-03-24 @ 10:25), Max: 98.3 (01-03-24 @ 10:25)  HR: 80 (01-03-24 @ 10:25) (80 - 80)  BP: 100/61 (01-03-24 @ 10:25) (100/61 - 100/61)  BP(mean): --  RR: 17 (01-03-24 @ 10:25) (17 - 17)  SpO2: 95% (01-03-24 @ 10:25) (95% - 95%)  I&O's Summary    02 Jan 2024 07:01  -  03 Jan 2024 07:00  --------------------------------------------------------  IN: 0 mL / OUT: 600 mL / NET: -600 mL        General: Pt Alert and oriented, NAD  DSG C/D/I- ACE wrap in KI to RLE  Pulses: 2+ DP pulses palpable bilaterally, skin wwp, cap refill brisk, no calf tenderness  Sensation: SILT to bilateral lower extremities  Motor: EHL/FHL/TA/GS 5/5 bilaterally                           7.0    7.27  )-----------( 163      ( 03 Jan 2024 09:09 )             21.1     01-03    136  |  104  |  16  ----------------------------<  138<H>  3.7   |  25  |  0.86    Ca    8.4      03 Jan 2024 05:30        A/P: 79yo Female POD#2 s/p Right retrograde nail  - Stable, appreciate medical comanagement  - Hgb 6.6, repeat 7.0. Patient asymptomatic. Patient refusing blood transfusion  - Pain Control  - OOB/IS  - Bowel Regimen  - DVT ppx: ASA 81mg BID  - PT, WBS: WBAT  - Dispo: [facility]     Ortho Pager [phone] Ortho Note    Patient seen and examined at bedside. Pt comfortable without complaints, pain controlled. States she does not want a blood transfusion.   Denies CP, palpitations, lightheadedness/dizziness, SOB, N/V, new numbness/tingling     Vital Signs Last 24 Hrs  T(C): 36.8 (01-03-24 @ 10:25), Max: 36.8 (01-03-24 @ 10:25)  T(F): 98.3 (01-03-24 @ 10:25), Max: 98.3 (01-03-24 @ 10:25)  HR: 80 (01-03-24 @ 10:25) (80 - 80)  BP: 100/61 (01-03-24 @ 10:25) (100/61 - 100/61)  BP(mean): --  RR: 17 (01-03-24 @ 10:25) (17 - 17)  SpO2: 95% (01-03-24 @ 10:25) (95% - 95%)  I&O's Summary    02 Jan 2024 07:01  -  03 Jan 2024 07:00  --------------------------------------------------------  IN: 0 mL / OUT: 600 mL / NET: -600 mL        General: Pt Alert and oriented, NAD  DSG C/D/I- ACE wrap in KI to RLE  Pulses: 2+ DP pulses palpable bilaterally, skin wwp, cap refill brisk, no calf tenderness  Sensation: SILT to bilateral lower extremities  Motor: EHL/FHL/TA/GS 5/5 bilaterally                           7.0    7.27  )-----------( 163      ( 03 Jan 2024 09:09 )             21.1     01-03    136  |  104  |  16  ----------------------------<  138<H>  3.7   |  25  |  0.86    Ca    8.4      03 Jan 2024 05:30        A/P: 77yo Female POD#2 s/p Right retrograde nail  - Stable, appreciate medical comanagement  - Hgb 6.6, repeat 7.0. Patient asymptomatic. Patient refusing blood transfusion  - Pain Control  - OOB/IS  - Bowel Regimen  - DVT ppx: ASA 81mg BID  - PT, WBS: WBAT  - Dispo: [facility]     Ortho Pager [phone]

## 2024-01-03 NOTE — OCCUPATIONAL THERAPY INITIAL EVALUATION ADULT - GENERAL OBSERVATIONS, REHAB EVAL
OT IE complete. RN Lilibeth clearing pt. for session. Pt. received semi-supine in bed, +heplock, +primafit, + RLE in KI w. c/o pain (denied pain meds prior per RN), but agreeable to session w. max education.

## 2024-01-03 NOTE — PROGRESS NOTE ADULT - ASSESSMENT
78y Female w/ PMHx HLD, and LE edema (on spironolactone), PSH L TKA 2016 w/ Dr. Posadas, L fore foot reconstruction Dr. Boswell 2021, presents after fall, found to have right distal femur fracture, admitted to orthopedic surgery. Medicine consulted for pre-op evaluation for ORIF R distal femur.        # Right Distal Femur Fracture   - S/p ORIF on 1/1/24   - pain control , DVT prophylaxis , Weightbearing status , Dressing changes and drain care per ortho team      # Acute Blood loss Anemia   - Hgb 7gm/dl on 1/3/24 ,  Transfuse 1 unit of PRBC today     # HLD     # LE Edema   - can restart spironolactone 1/3/24    # DVT prophylaxis per Ortho

## 2024-01-04 LAB
ANION GAP SERPL CALC-SCNC: 8 MMOL/L — SIGNIFICANT CHANGE UP (ref 5–17)
ANION GAP SERPL CALC-SCNC: 8 MMOL/L — SIGNIFICANT CHANGE UP (ref 5–17)
BUN SERPL-MCNC: 12 MG/DL — SIGNIFICANT CHANGE UP (ref 7–23)
BUN SERPL-MCNC: 12 MG/DL — SIGNIFICANT CHANGE UP (ref 7–23)
CALCIUM SERPL-MCNC: 8.2 MG/DL — LOW (ref 8.4–10.5)
CALCIUM SERPL-MCNC: 8.2 MG/DL — LOW (ref 8.4–10.5)
CHLORIDE SERPL-SCNC: 107 MMOL/L — SIGNIFICANT CHANGE UP (ref 96–108)
CHLORIDE SERPL-SCNC: 107 MMOL/L — SIGNIFICANT CHANGE UP (ref 96–108)
CO2 SERPL-SCNC: 24 MMOL/L — SIGNIFICANT CHANGE UP (ref 22–31)
CO2 SERPL-SCNC: 24 MMOL/L — SIGNIFICANT CHANGE UP (ref 22–31)
CREAT SERPL-MCNC: 0.73 MG/DL — SIGNIFICANT CHANGE UP (ref 0.5–1.3)
CREAT SERPL-MCNC: 0.73 MG/DL — SIGNIFICANT CHANGE UP (ref 0.5–1.3)
EGFR: 83 ML/MIN/1.73M2 — SIGNIFICANT CHANGE UP
EGFR: 83 ML/MIN/1.73M2 — SIGNIFICANT CHANGE UP
FERRITIN SERPL-MCNC: 112 NG/ML — SIGNIFICANT CHANGE UP (ref 13–330)
FERRITIN SERPL-MCNC: 112 NG/ML — SIGNIFICANT CHANGE UP (ref 13–330)
GLUCOSE SERPL-MCNC: 109 MG/DL — HIGH (ref 70–99)
GLUCOSE SERPL-MCNC: 109 MG/DL — HIGH (ref 70–99)
HCT VFR BLD CALC: 21.4 % — LOW (ref 34.5–45)
HCT VFR BLD CALC: 21.4 % — LOW (ref 34.5–45)
HGB BLD-MCNC: 7 G/DL — CRITICAL LOW (ref 11.5–15.5)
HGB BLD-MCNC: 7 G/DL — CRITICAL LOW (ref 11.5–15.5)
IRON SATN MFR SERPL: 15 UG/DL — LOW (ref 30–160)
IRON SATN MFR SERPL: 15 UG/DL — LOW (ref 30–160)
IRON SATN MFR SERPL: 9 % — LOW (ref 14–50)
IRON SATN MFR SERPL: 9 % — LOW (ref 14–50)
MCHC RBC-ENTMCNC: 28.9 PG — SIGNIFICANT CHANGE UP (ref 27–34)
MCHC RBC-ENTMCNC: 28.9 PG — SIGNIFICANT CHANGE UP (ref 27–34)
MCHC RBC-ENTMCNC: 32.7 GM/DL — SIGNIFICANT CHANGE UP (ref 32–36)
MCHC RBC-ENTMCNC: 32.7 GM/DL — SIGNIFICANT CHANGE UP (ref 32–36)
MCV RBC AUTO: 88.4 FL — SIGNIFICANT CHANGE UP (ref 80–100)
MCV RBC AUTO: 88.4 FL — SIGNIFICANT CHANGE UP (ref 80–100)
NRBC # BLD: 0 /100 WBCS — SIGNIFICANT CHANGE UP (ref 0–0)
NRBC # BLD: 0 /100 WBCS — SIGNIFICANT CHANGE UP (ref 0–0)
PLATELET # BLD AUTO: 174 K/UL — SIGNIFICANT CHANGE UP (ref 150–400)
PLATELET # BLD AUTO: 174 K/UL — SIGNIFICANT CHANGE UP (ref 150–400)
POTASSIUM SERPL-MCNC: 3.8 MMOL/L — SIGNIFICANT CHANGE UP (ref 3.5–5.3)
POTASSIUM SERPL-MCNC: 3.8 MMOL/L — SIGNIFICANT CHANGE UP (ref 3.5–5.3)
POTASSIUM SERPL-SCNC: 3.8 MMOL/L — SIGNIFICANT CHANGE UP (ref 3.5–5.3)
POTASSIUM SERPL-SCNC: 3.8 MMOL/L — SIGNIFICANT CHANGE UP (ref 3.5–5.3)
RBC # BLD: 2.42 M/UL — LOW (ref 3.8–5.2)
RBC # BLD: 2.42 M/UL — LOW (ref 3.8–5.2)
RBC # FLD: 14.4 % — SIGNIFICANT CHANGE UP (ref 10.3–14.5)
RBC # FLD: 14.4 % — SIGNIFICANT CHANGE UP (ref 10.3–14.5)
SODIUM SERPL-SCNC: 139 MMOL/L — SIGNIFICANT CHANGE UP (ref 135–145)
SODIUM SERPL-SCNC: 139 MMOL/L — SIGNIFICANT CHANGE UP (ref 135–145)
TIBC SERPL-MCNC: 161 UG/DL — LOW (ref 220–430)
TIBC SERPL-MCNC: 161 UG/DL — LOW (ref 220–430)
TRANSFERRIN SERPL-MCNC: 140 MG/DL — LOW (ref 200–360)
TRANSFERRIN SERPL-MCNC: 140 MG/DL — LOW (ref 200–360)
UIBC SERPL-MCNC: 146 UG/DL — SIGNIFICANT CHANGE UP (ref 110–370)
UIBC SERPL-MCNC: 146 UG/DL — SIGNIFICANT CHANGE UP (ref 110–370)
WBC # BLD: 6.23 K/UL — SIGNIFICANT CHANGE UP (ref 3.8–10.5)
WBC # BLD: 6.23 K/UL — SIGNIFICANT CHANGE UP (ref 3.8–10.5)
WBC # FLD AUTO: 6.23 K/UL — SIGNIFICANT CHANGE UP (ref 3.8–10.5)
WBC # FLD AUTO: 6.23 K/UL — SIGNIFICANT CHANGE UP (ref 3.8–10.5)

## 2024-01-04 PROCEDURE — 99233 SBSQ HOSP IP/OBS HIGH 50: CPT

## 2024-01-04 RX ORDER — IRON SUCROSE 20 MG/ML
300 INJECTION, SOLUTION INTRAVENOUS EVERY 24 HOURS
Refills: 0 | Status: COMPLETED | OUTPATIENT
Start: 2024-01-04 | End: 2024-01-06

## 2024-01-04 RX ORDER — LANOLIN ALCOHOL/MO/W.PET/CERES
5 CREAM (GRAM) TOPICAL AT BEDTIME
Refills: 0 | Status: DISCONTINUED | OUTPATIENT
Start: 2024-01-04 | End: 2024-01-08

## 2024-01-04 RX ADMIN — Medication 1000 MILLIGRAM(S): at 07:46

## 2024-01-04 RX ADMIN — HYDROMORPHONE HYDROCHLORIDE 0.5 MILLIGRAM(S): 2 INJECTION INTRAMUSCULAR; INTRAVENOUS; SUBCUTANEOUS at 13:46

## 2024-01-04 RX ADMIN — Medication 1000 MILLIGRAM(S): at 06:46

## 2024-01-04 RX ADMIN — ATORVASTATIN CALCIUM 10 MILLIGRAM(S): 80 TABLET, FILM COATED ORAL at 22:14

## 2024-01-04 RX ADMIN — Medication 1 TABLET(S): at 12:02

## 2024-01-04 RX ADMIN — HYDROMORPHONE HYDROCHLORIDE 0.5 MILLIGRAM(S): 2 INJECTION INTRAMUSCULAR; INTRAVENOUS; SUBCUTANEOUS at 14:01

## 2024-01-04 RX ADMIN — Medication 81 MILLIGRAM(S): at 06:46

## 2024-01-04 RX ADMIN — FAMOTIDINE 20 MILLIGRAM(S): 10 INJECTION INTRAVENOUS at 06:46

## 2024-01-04 RX ADMIN — SODIUM CHLORIDE 125 MILLILITER(S): 9 INJECTION INTRAMUSCULAR; INTRAVENOUS; SUBCUTANEOUS at 13:46

## 2024-01-04 RX ADMIN — IRON SUCROSE 176.67 MILLIGRAM(S): 20 INJECTION, SOLUTION INTRAVENOUS at 16:00

## 2024-01-04 RX ADMIN — FAMOTIDINE 20 MILLIGRAM(S): 10 INJECTION INTRAVENOUS at 18:14

## 2024-01-04 RX ADMIN — SPIRONOLACTONE 25 MILLIGRAM(S): 25 TABLET, FILM COATED ORAL at 06:46

## 2024-01-04 NOTE — PROGRESS NOTE ADULT - SUBJECTIVE AND OBJECTIVE BOX
Orthopaedic Surgery Progress Note    Subjective:     Patient seen and examined w/ medicine attending Dr. Miranda. Patient comfortable but somewhat distressed about hospital course/dispo. Still refusing blood transfusion.       Objective:    Vital Signs Last 24 Hrs  T(C): 36.5 (01-04-24 @ 09:10), Max: 36.5 (01-04-24 @ 05:33)  T(F): 97.7 (01-04-24 @ 09:10), Max: 97.7 (01-04-24 @ 05:33)  HR: 88 (01-04-24 @ 09:10) (85 - 88)  BP: 107/68 (01-04-24 @ 09:10) (107/68 - 115/65)  BP(mean): --  RR: 18 (01-04-24 @ 09:10) (16 - 18)  SpO2: 95% (01-04-24 @ 09:10) (95% - 96%)  AVSS    PE:  General: Patient alert and oriented, NAD  Dressing: Clean/dry/intact right lower extremity in    Sensation: intact to bilateral lower extremities   Motor: EHL/FHL/TA/GS firing bilateral lower extremities                           7.0    6.23  )-----------( 174      ( 04 Jan 2024 05:30 )             21.4   01-04    139  |  107  |  12  ----------------------------<  109<H>  3.8   |  24  |  0.73    Ca    8.2<L>      04 Jan 2024 05:30        A/P: 78yFemale POD#2 s/p right femur nail for right femoral shaft fracture   1. Pain control as needed  2. DVT prophylaxis: ASA  3. PT, weight-bearing status: WBAT RLE in ; patient recommended for [facility] however does not want to go to [facility], would prefer to go home- SW to discuss with patient.   4. Hgb 6.6 yesterday - patient refused blood transfusion. Hgb 7 this AM. Patient asymptomatic. Dr. Miranda and I discussed at length with patient possible need for blood transfusion if she becomes symptomatic however patient still adamantly refusing. Per Dr. Miranda will check iron studies; repeat CBC in AM.      Orthopaedic Surgery Progress Note    Subjective:     Patient seen and examined w/ medicine attending Dr. Miranda. Patient comfortable but somewhat distressed about hospital course/dispo. Still refusing blood transfusion.       Objective:    Vital Signs Last 24 Hrs  T(C): 36.5 (01-04-24 @ 09:10), Max: 36.5 (01-04-24 @ 05:33)  T(F): 97.7 (01-04-24 @ 09:10), Max: 97.7 (01-04-24 @ 05:33)  HR: 88 (01-04-24 @ 09:10) (85 - 88)  BP: 107/68 (01-04-24 @ 09:10) (107/68 - 115/65)  BP(mean): --  RR: 18 (01-04-24 @ 09:10) (16 - 18)  SpO2: 95% (01-04-24 @ 09:10) (95% - 96%)  AVSS    PE:  General: Patient alert and oriented, NAD  Dressing: Clean/dry/intact right lower extremity in    Sensation: intact to bilateral lower extremities   Motor: EHL/FHL/TA/GS firing bilateral lower extremities                           7.0    6.23  )-----------( 174      ( 04 Jan 2024 05:30 )             21.4   01-04    139  |  107  |  12  ----------------------------<  109<H>  3.8   |  24  |  0.73    Ca    8.2<L>      04 Jan 2024 05:30        A/P: 78yFemale POD#2 s/p right femur nail for right femoral shaft fracture   1. Pain control as needed  2. DVT prophylaxis: ASA  3. PT, weight-bearing status: WBAT RLE in ; [facility] however [facility] not want [facility] would prefer to go home- SW to discuss with patient.   4. Hgb 6.6 yesterday - patient refused blood transfusion. Hgb 7 this AM. Patient asymptomatic. Dr. Miranda and I discussed at length with patient possible need for blood transfusion if she becomes symptomatic however patient still adamantly refusing. Per Dr. Miranda will check iron studies; repeat CBC in AM.

## 2024-01-04 NOTE — PROGRESS NOTE ADULT - SUBJECTIVE AND OBJECTIVE BOX
Patient is a 78y old  Female who presents with a chief complaint of R distal femur fx (02 Jan 2024 15:07)        SUBJECTIVE:  Patient was seen and examined at bedside.    Overnight Events : S/p ORIF on 1/1/24 , resting in bed , complaining of pain - Right Leg , no lightheadedness       Review of systems: 12 point Review of systems negative unless otherwise documented elsewhere in note.     Diet, Regular (01-01-24 @ 08:19) [Active]      MEDICATIONS:  MEDICATIONS  (STANDING):  acetaminophen     Tablet .. 1000 milliGRAM(s) Oral every 8 hours  aspirin  chewable 81 milliGRAM(s) Oral two times a day  atorvastatin 10 milliGRAM(s) Oral at bedtime  chlorhexidine 2% Cloths 1 Application(s) Topical <User Schedule>  famotidine    Tablet 40 milliGRAM(s) Oral two times a day  lactated ringers. 1000 milliLiter(s) (100 mL/Hr) IV Continuous <Continuous>  multivitamin 1 Tablet(s) Oral daily  polyethylene glycol 3350 17 Gram(s) Oral at bedtime  povidone iodine 5% Nasal Swab 1 Application(s) Both Nostrils once  senna 2 Tablet(s) Oral at bedtime  sodium chloride 0.9%. 1000 milliLiter(s) (125 mL/Hr) IV Continuous <Continuous>    MEDICATIONS  (PRN):  HYDROmorphone  Injectable 0.5 milliGRAM(s) IV Push every 15 minutes PRN breakthrough  HYDROmorphone  Injectable 0.5 milliGRAM(s) IV Push every 6 hours PRN breakthrough  magnesium hydroxide Suspension 30 milliLiter(s) Oral daily PRN Constipation  magnesium hydroxide Suspension 30 milliLiter(s) Oral daily PRN Constipation  ondansetron Injectable 4 milliGRAM(s) IV Push every 6 hours PRN Nausea and/or Vomiting  oxyCODONE    IR 10 milliGRAM(s) Oral every 4 hours PRN Severe Pain (7 - 10)  oxyCODONE    IR 5 milliGRAM(s) Oral every 4 hours PRN Moderate Pain (4 - 6)      Allergies    No Known Allergies    Intolerances        OBJECTIVE:  Vital Signs Last 24 Hrs  T(C): 36.5 (04 Jan 2024 09:10), Max: 37.1 (03 Jan 2024 20:50)  T(F): 97.7 (04 Jan 2024 09:10), Max: 98.8 (03 Jan 2024 20:50)  HR: 88 (04 Jan 2024 09:10) (82 - 90)  BP: 107/68 (04 Jan 2024 09:10) (107/68 - 132/65)  BP(mean): --  RR: 18 (04 Jan 2024 09:10) (16 - 18)  SpO2: 95% (04 Jan 2024 09:10) (95% - 97%)    Parameters below as of 04 Jan 2024 09:10  Patient On (Oxygen Delivery Method): room air              PHYSICAL EXAM:  Gen: Resting in bed at time of exam, not in distress   HEENT: moist mucosa, no lesions   Neck: supple, trachea at midline  CV: RRR, +S1/S2  Pulm: no wheezing , no crackles  no increase in work of breathing  Abd: soft, NTND  Skin: warm and dry, no new rashes   Ext:  Right leg and knee pain   Neuro: AOx3, no gross focal neurological deficits  Psych: affect and behavior appropriate, pleasant at time of interview    LABS:                                              7.0    6.23  )-----------( 174      ( 04 Jan 2024 05:30 )             21.4       01-04    139  |  107  |  12  ----------------------------<  109<H>  3.8   |  24  |  0.73    Ca    8.2<L>      04 Jan 2024 05:30              MICRODATA:    Urinalysis with Rflx Culture (collected 01 Jan 2024 01:41)        RADIOLOGY/OTHER STUDIES:

## 2024-01-04 NOTE — PROVIDER CONTACT NOTE (CRITICAL VALUE NOTIFICATION) - ACTION/TREATMENT ORDERED:
Hgb critical value was reported to romi MORA. Doctor will speak with patient to discuss blood transfusion.

## 2024-01-04 NOTE — PROGRESS NOTE ADULT - ASSESSMENT
78y Female w/ PMHx HLD, and LE edema (on spironolactone), PSH L TKA 2016 w/ Dr. Posadas, L fore foot reconstruction Dr. Boswell 2021, presents after fall, found to have right distal femur fracture, admitted to orthopedic surgery. Medicine consulted for pre-op evaluation for ORIF R distal femur.        # Right Distal Femur Fracture   - S/p ORIF on 1/1/24   - pain control , DVT prophylaxis , Weightbearing status , Dressing changes and drain care per ortho team      # Acute Blood loss Anemia   - Hgb 7gm/dl on 1/3/24 , Patient does not want blood transfusion , Check Iron Studies , if low can replace with IV iron      # HLD     # LE Edema   - Spironolactone     # DVT prophylaxis per Ortho

## 2024-01-05 LAB
ANION GAP SERPL CALC-SCNC: 7 MMOL/L — SIGNIFICANT CHANGE UP (ref 5–17)
ANION GAP SERPL CALC-SCNC: 7 MMOL/L — SIGNIFICANT CHANGE UP (ref 5–17)
BLD GP AB SCN SERPL QL: NEGATIVE — SIGNIFICANT CHANGE UP
BLD GP AB SCN SERPL QL: NEGATIVE — SIGNIFICANT CHANGE UP
BUN SERPL-MCNC: 12 MG/DL — SIGNIFICANT CHANGE UP (ref 7–23)
BUN SERPL-MCNC: 12 MG/DL — SIGNIFICANT CHANGE UP (ref 7–23)
CALCIUM SERPL-MCNC: 8.5 MG/DL — SIGNIFICANT CHANGE UP (ref 8.4–10.5)
CALCIUM SERPL-MCNC: 8.5 MG/DL — SIGNIFICANT CHANGE UP (ref 8.4–10.5)
CHLORIDE SERPL-SCNC: 102 MMOL/L — SIGNIFICANT CHANGE UP (ref 96–108)
CHLORIDE SERPL-SCNC: 102 MMOL/L — SIGNIFICANT CHANGE UP (ref 96–108)
CO2 SERPL-SCNC: 26 MMOL/L — SIGNIFICANT CHANGE UP (ref 22–31)
CO2 SERPL-SCNC: 26 MMOL/L — SIGNIFICANT CHANGE UP (ref 22–31)
CREAT SERPL-MCNC: 0.71 MG/DL — SIGNIFICANT CHANGE UP (ref 0.5–1.3)
CREAT SERPL-MCNC: 0.71 MG/DL — SIGNIFICANT CHANGE UP (ref 0.5–1.3)
EGFR: 86 ML/MIN/1.73M2 — SIGNIFICANT CHANGE UP
EGFR: 86 ML/MIN/1.73M2 — SIGNIFICANT CHANGE UP
GLUCOSE SERPL-MCNC: 134 MG/DL — HIGH (ref 70–99)
GLUCOSE SERPL-MCNC: 134 MG/DL — HIGH (ref 70–99)
HCT VFR BLD CALC: 21.4 % — LOW (ref 34.5–45)
HCT VFR BLD CALC: 21.4 % — LOW (ref 34.5–45)
HGB BLD-MCNC: 6.8 G/DL — CRITICAL LOW (ref 11.5–15.5)
HGB BLD-MCNC: 6.8 G/DL — CRITICAL LOW (ref 11.5–15.5)
MCHC RBC-ENTMCNC: 28.5 PG — SIGNIFICANT CHANGE UP (ref 27–34)
MCHC RBC-ENTMCNC: 28.5 PG — SIGNIFICANT CHANGE UP (ref 27–34)
MCHC RBC-ENTMCNC: 31.8 GM/DL — LOW (ref 32–36)
MCHC RBC-ENTMCNC: 31.8 GM/DL — LOW (ref 32–36)
MCV RBC AUTO: 89.5 FL — SIGNIFICANT CHANGE UP (ref 80–100)
MCV RBC AUTO: 89.5 FL — SIGNIFICANT CHANGE UP (ref 80–100)
NRBC # BLD: 0 /100 WBCS — SIGNIFICANT CHANGE UP (ref 0–0)
NRBC # BLD: 0 /100 WBCS — SIGNIFICANT CHANGE UP (ref 0–0)
PLATELET # BLD AUTO: 225 K/UL — SIGNIFICANT CHANGE UP (ref 150–400)
PLATELET # BLD AUTO: 225 K/UL — SIGNIFICANT CHANGE UP (ref 150–400)
POTASSIUM SERPL-MCNC: 3.8 MMOL/L — SIGNIFICANT CHANGE UP (ref 3.5–5.3)
POTASSIUM SERPL-MCNC: 3.8 MMOL/L — SIGNIFICANT CHANGE UP (ref 3.5–5.3)
POTASSIUM SERPL-SCNC: 3.8 MMOL/L — SIGNIFICANT CHANGE UP (ref 3.5–5.3)
POTASSIUM SERPL-SCNC: 3.8 MMOL/L — SIGNIFICANT CHANGE UP (ref 3.5–5.3)
RBC # BLD: 2.39 M/UL — LOW (ref 3.8–5.2)
RBC # BLD: 2.39 M/UL — LOW (ref 3.8–5.2)
RBC # FLD: 14.2 % — SIGNIFICANT CHANGE UP (ref 10.3–14.5)
RBC # FLD: 14.2 % — SIGNIFICANT CHANGE UP (ref 10.3–14.5)
RH IG SCN BLD-IMP: POSITIVE — SIGNIFICANT CHANGE UP
RH IG SCN BLD-IMP: POSITIVE — SIGNIFICANT CHANGE UP
SODIUM SERPL-SCNC: 135 MMOL/L — SIGNIFICANT CHANGE UP (ref 135–145)
SODIUM SERPL-SCNC: 135 MMOL/L — SIGNIFICANT CHANGE UP (ref 135–145)
WBC # BLD: 6.82 K/UL — SIGNIFICANT CHANGE UP (ref 3.8–10.5)
WBC # BLD: 6.82 K/UL — SIGNIFICANT CHANGE UP (ref 3.8–10.5)
WBC # FLD AUTO: 6.82 K/UL — SIGNIFICANT CHANGE UP (ref 3.8–10.5)
WBC # FLD AUTO: 6.82 K/UL — SIGNIFICANT CHANGE UP (ref 3.8–10.5)

## 2024-01-05 PROCEDURE — 99233 SBSQ HOSP IP/OBS HIGH 50: CPT

## 2024-01-05 RX ORDER — MAGNESIUM HYDROXIDE 400 MG/1
30 TABLET, CHEWABLE ORAL
Qty: 0 | Refills: 0 | DISCHARGE
Start: 2024-01-05

## 2024-01-05 RX ORDER — HYDROMORPHONE HYDROCHLORIDE 2 MG/ML
4 INJECTION INTRAMUSCULAR; INTRAVENOUS; SUBCUTANEOUS EVERY 4 HOURS
Refills: 0 | Status: DISCONTINUED | OUTPATIENT
Start: 2024-01-05 | End: 2024-01-08

## 2024-01-05 RX ORDER — CALCIUM CARBONATE 500(1250)
1 TABLET ORAL
Qty: 0 | Refills: 0 | DISCHARGE
Start: 2024-01-05

## 2024-01-05 RX ORDER — SPIRONOLACTONE 25 MG/1
1 TABLET, FILM COATED ORAL
Qty: 0 | Refills: 0 | DISCHARGE
Start: 2024-01-05

## 2024-01-05 RX ORDER — BACITRACIN ZINC 500 UNIT/G
1 OINTMENT IN PACKET (EA) TOPICAL
Refills: 0 | Status: DISCONTINUED | OUTPATIENT
Start: 2024-01-05 | End: 2024-01-08

## 2024-01-05 RX ORDER — ATORVASTATIN CALCIUM 80 MG/1
1 TABLET, FILM COATED ORAL
Qty: 0 | Refills: 0 | DISCHARGE

## 2024-01-05 RX ORDER — OXYCODONE HYDROCHLORIDE 5 MG/1
1 TABLET ORAL
Qty: 0 | Refills: 0 | DISCHARGE
Start: 2024-01-05

## 2024-01-05 RX ORDER — ACETAMINOPHEN 500 MG
2 TABLET ORAL
Qty: 0 | Refills: 0 | DISCHARGE
Start: 2024-01-05

## 2024-01-05 RX ORDER — ASPIRIN/CALCIUM CARB/MAGNESIUM 324 MG
1 TABLET ORAL
Qty: 0 | Refills: 0 | DISCHARGE
Start: 2024-01-05

## 2024-01-05 RX ORDER — ATORVASTATIN CALCIUM 80 MG/1
1 TABLET, FILM COATED ORAL
Qty: 0 | Refills: 0 | DISCHARGE
Start: 2024-01-05

## 2024-01-05 RX ORDER — SENNA PLUS 8.6 MG/1
2 TABLET ORAL
Qty: 0 | Refills: 0 | DISCHARGE
Start: 2024-01-05

## 2024-01-05 RX ORDER — HYDROMORPHONE HYDROCHLORIDE 2 MG/ML
2 INJECTION INTRAMUSCULAR; INTRAVENOUS; SUBCUTANEOUS EVERY 4 HOURS
Refills: 0 | Status: DISCONTINUED | OUTPATIENT
Start: 2024-01-05 | End: 2024-01-08

## 2024-01-05 RX ADMIN — Medication 1 APPLICATION(S): at 18:20

## 2024-01-05 RX ADMIN — HYDROMORPHONE HYDROCHLORIDE 0.5 MILLIGRAM(S): 2 INJECTION INTRAMUSCULAR; INTRAVENOUS; SUBCUTANEOUS at 14:14

## 2024-01-05 RX ADMIN — IRON SUCROSE 176.67 MILLIGRAM(S): 20 INJECTION, SOLUTION INTRAVENOUS at 15:43

## 2024-01-05 RX ADMIN — HYDROMORPHONE HYDROCHLORIDE 0.5 MILLIGRAM(S): 2 INJECTION INTRAMUSCULAR; INTRAVENOUS; SUBCUTANEOUS at 14:35

## 2024-01-05 RX ADMIN — Medication 81 MILLIGRAM(S): at 18:20

## 2024-01-05 RX ADMIN — SPIRONOLACTONE 25 MILLIGRAM(S): 25 TABLET, FILM COATED ORAL at 06:03

## 2024-01-05 RX ADMIN — FAMOTIDINE 20 MILLIGRAM(S): 10 INJECTION INTRAVENOUS at 18:20

## 2024-01-05 RX ADMIN — FAMOTIDINE 20 MILLIGRAM(S): 10 INJECTION INTRAVENOUS at 06:02

## 2024-01-05 RX ADMIN — Medication 81 MILLIGRAM(S): at 06:02

## 2024-01-05 RX ADMIN — ATORVASTATIN CALCIUM 10 MILLIGRAM(S): 80 TABLET, FILM COATED ORAL at 21:22

## 2024-01-05 RX ADMIN — Medication 1 TABLET(S): at 14:06

## 2024-01-05 NOTE — PROGRESS NOTE ADULT - SUBJECTIVE AND OBJECTIVE BOX
Ortho Note    Subjective:  Pt comfortable without complaints, pain controlled with current pain medication regimen   Denies CP, SOB, N/V, numbness/tingling   Patient HGB 6.8 this am, discussed plan for blood transfusion, explained risk versus benefits, Patient refusing blood at this time, reporting she is no asymptomatic and that she does not like "having other peoples blood inside her".  Patient recommended for [facility] patient expressing she wants to go home   Reviewed plan of care with patient at bedside      Vital Signs Last 24 Hrs  T(C): 36.9 (01-05-24 @ 08:56), Max: 36.9 (01-05-24 @ 08:56)  T(F): 98.4 (01-05-24 @ 08:56), Max: 98.4 (01-05-24 @ 08:56)  HR: 85 (01-05-24 @ 08:56) (85 - 92)  BP: 119/73 (01-05-24 @ 08:56) (119/73 - 138/77)  BP(mean): --  RR: 16 (01-05-24 @ 08:56) (16 - 16)  SpO2: 97% (01-05-24 @ 08:56) (97% - 97%)  AVSS    Objective:    Physical Exam:  General: Pt Alert and oriented, NAD  Right HIP ace bandage with KI- x2 weeks of KI DSG C/D/I  Pulses: +2 pedal pulses DP  Sensation: silt intact bilateral lower extremities  Motor: EHL/FHL/TA/GS - 5/5 bilateral lower extremities        Plan of Care:  A/P: 78yFemale  RIght Fem shaft s/p Right HIP retrograde nail on 1/4/2023  - afebrile   - Pain Control- tylenol 1000mg PO Q8h, Dilaudid 0.5mg Q6h prn breakthrough pain, Oxycodone 5-10mg PO Q4h prn moderate to severe pain   - DVT ppx: aspirin 81mg PO BID  - PT, WBS: WBAT   - HGB 6.8- patient a candidate for transfusion of PRBCS x1 unit, patient refusing at this time , v/s stable, asymptomatic, will continue to trend   - bowel regimen, Is use, PPI   - appreciate medicine recs  - Dispo- [facility] patient requesting to go home.     Ortho Pager [phone] Ortho Note    Subjective:  Pt comfortable without complaints, pain controlled with current pain medication regimen   Denies CP, SOB, N/V, numbness/tingling   Patient HGB 6.8 this am, discussed plan for blood transfusion, explained risk versus benefits, Patient refusing blood at this time, reporting she is no asymptomatic and that she does not like "having other peoples blood inside her".  Patient recommended for [facility] patient expressing she wants to go home   Reviewed plan of care with patient at bedside      Vital Signs Last 24 Hrs  T(C): 36.9 (01-05-24 @ 08:56), Max: 36.9 (01-05-24 @ 08:56)  T(F): 98.4 (01-05-24 @ 08:56), Max: 98.4 (01-05-24 @ 08:56)  HR: 85 (01-05-24 @ 08:56) (85 - 92)  BP: 119/73 (01-05-24 @ 08:56) (119/73 - 138/77)  BP(mean): --  RR: 16 (01-05-24 @ 08:56) (16 - 16)  SpO2: 97% (01-05-24 @ 08:56) (97% - 97%)  AVSS    Objective:    Physical Exam:  General: Pt Alert and oriented, NAD  Right HIP ace bandage with KI- x2 weeks of KI DSG C/D/I  Pulses: +2 pedal pulses DP  Sensation: silt intact bilateral lower extremities  Motor: EHL/FHL/TA/GS - 5/5 bilateral lower extremities        Plan of Care:  A/P: 78yFemale  RIght Fem shaft s/p Right HIP retrograde nail on 1/4/2023  - afebrile   - Pain Control- tylenol 1000mg PO Q8h, Dilaudid 0.5mg Q6h prn breakthrough pain, Oxycodone 5-10mg PO Q4h prn moderate to severe pain   - DVT ppx: aspirin 81mg PO BID  - PT, WBS: WBAT   - HGB 6.8- patient a candidate for transfusion of PRBCS x1 unit, patient refusing at this time , v/s stable, asymptomatic, will continue to trend   - bowel regimen, Is use, PPI   - appreciate medicine recs  - Dispo- [facility]OMI patient requesting to go home.     Ortho Pager [phone]

## 2024-01-05 NOTE — PROVIDER CONTACT NOTE (CRITICAL VALUE NOTIFICATION) - ACTION/TREATMENT ORDERED:
Spoke to MD Romo on the phone about patient's critical hemoglobin level. Patient is refusing blood transfusion and she is ordered for iron IVPB.

## 2024-01-05 NOTE — PROVIDER CONTACT NOTE (CRITICAL VALUE NOTIFICATION) - RECOMMENDATIONS
Patient would benefit from receiving packed red blood cells.
MD Romo recommended 1 U PRBCs
I would recommend the patient to receive a blood transfusion.
no orders made yet

## 2024-01-05 NOTE — PROGRESS NOTE ADULT - SUBJECTIVE AND OBJECTIVE BOX
Patient is a 78y old  Female who presents with a chief complaint of R distal femur fx (02 Jan 2024 15:07)        SUBJECTIVE:  Patient was seen and examined at bedside.    Overnight Events : S/p ORIF on 1/1/24 , resting in bed , complaining of pain - Right Leg , no lightheadedness       Diet, Regular (01-01-24 @ 08:19) [Active]      MEDICATIONS:  MEDICATIONS  (STANDING):  acetaminophen     Tablet .. 1000 milliGRAM(s) Oral every 8 hours  aspirin  chewable 81 milliGRAM(s) Oral two times a day  atorvastatin 10 milliGRAM(s) Oral at bedtime  bacitracin   Ointment 1 Application(s) Topical two times a day  famotidine    Tablet 20 milliGRAM(s) Oral two times a day  iron sucrose IVPB 300 milliGRAM(s) IV Intermittent every 24 hours  lactated ringers. 1000 milliLiter(s) (100 mL/Hr) IV Continuous <Continuous>  multivitamin 1 Tablet(s) Oral daily  polyethylene glycol 3350 17 Gram(s) Oral at bedtime  povidone iodine 5% Nasal Swab 1 Application(s) Both Nostrils once  senna 2 Tablet(s) Oral at bedtime  sodium chloride 0.9%. 1000 milliLiter(s) (125 mL/Hr) IV Continuous <Continuous>  spironolactone 25 milliGRAM(s) Oral two times a day    MEDICATIONS  (PRN):  calcium carbonate    500 mG (Tums) Chewable 1 Tablet(s) Chew every 6 hours PRN Indigestion  HYDROmorphone  Injectable 0.5 milliGRAM(s) IV Push every 15 minutes PRN breakthrough  HYDROmorphone  Injectable 0.5 milliGRAM(s) IV Push every 6 hours PRN breakthrough  magnesium hydroxide Suspension 30 milliLiter(s) Oral daily PRN Constipation  magnesium hydroxide Suspension 30 milliLiter(s) Oral daily PRN Constipation  melatonin 5 milliGRAM(s) Oral at bedtime PRN Sleep  ondansetron Injectable 4 milliGRAM(s) IV Push every 6 hours PRN Nausea and/or Vomiting  oxyCODONE    IR 10 milliGRAM(s) Oral every 4 hours PRN Severe Pain (7 - 10)  oxyCODONE    IR 5 milliGRAM(s) Oral every 4 hours PRN Moderate Pain (4 - 6)        Allergies    No Known Allergies    Intolerances        OBJECTIVE:  Vital Signs Last 24 Hrs  T(C): 36.9 (05 Jan 2024 08:56), Max: 36.9 (05 Jan 2024 08:56)  T(F): 98.4 (05 Jan 2024 08:56), Max: 98.4 (05 Jan 2024 08:56)  HR: 85 (05 Jan 2024 08:56) (85 - 97)  BP: 119/73 (05 Jan 2024 08:56) (106/57 - 144/82)  BP(mean): --  RR: 16 (05 Jan 2024 08:56) (16 - 17)  SpO2: 97% (05 Jan 2024 08:56) (95% - 97%)    Parameters below as of 05 Jan 2024 08:56  Patient On (Oxygen Delivery Method): room air                PHYSICAL EXAM:  Gen: Resting in bed at time of exam, not in distress   HEENT: moist mucosa, no lesions   Neck: supple, trachea at midline  CV: RRR, +S1/S2  Pulm: no wheezing , no crackles  no increase in work of breathing  Abd: soft, NTND  Skin:  Small Fluid Filled/Tense vesicles on right Thight/Groin  with mild erythema , non tender , not warm   Ext:  Right leg and knee pain   Neuro: AOx3, no gross focal neurological deficits  Psych: affect and behavior appropriate, pleasant at time of interview    LABS:                                                         6.8    6.82  )-----------( 225      ( 05 Jan 2024 07:26 )             21.4     01-05    135  |  102  |  12  ----------------------------<  134<H>  3.8   |  26  |  0.71    Ca    8.5      05 Jan 2024 07:26                  MICRODATA:    Urinalysis with Rflx Culture (collected 01 Jan 2024 01:41)        RADIOLOGY/OTHER STUDIES:

## 2024-01-05 NOTE — PROVIDER CONTACT NOTE (CRITICAL VALUE NOTIFICATION) - BACKGROUND
Patient's hemoglobin level was at a critical value of 6.6 yesterday and a repeat hgb was drawn. Patient refused a blood tranfusion.
Right femur ORIF on 1/1/24
post op
Patient has been refusing blood transfusion since 1/3/23.

## 2024-01-05 NOTE — PROGRESS NOTE ADULT - NS ATTEST RISK PROBLEM GEN_ALL_CORE FT
Decision making regarding Blood transfusion   evaluation of post operative Anemia   Independent Review of Labs , medication   decision making regarding withholding of anti HTN Meds

## 2024-01-05 NOTE — PROGRESS NOTE ADULT - SUBJECTIVE AND OBJECTIVE BOX
Procedure:  R femur retrograde nail   Surgeon: Dr. LINDA Jeffrey    continues to make progress w/ PT, able to walk from edge of bed to door yday with PT. she has developed a blister in her medial groin on RLE   Denies CP, SOB, N/V, numbness/tingling     Vital Signs Last 24 Hrs  T(C): 36.7 (03 Jan 2024 05:10), Max: 36.8 (02 Jan 2024 20:20)  T(F): 98.1 (03 Jan 2024 05:10), Max: 98.2 (02 Jan 2024 20:20)  HR: 86 (03 Jan 2024 05:10) (73 - 98)  BP: 112/64 (03 Jan 2024 05:10) (107/56 - 117/58)  BP(mean): --  RR: 17 (03 Jan 2024 05:10) (17 - 18)  SpO2: 97% (03 Jan 2024 05:10) (95% - 97%)    Parameters below as of 03 Jan 2024 05:10  Patient On (Oxygen Delivery Method): room air        General: Pt Alert and oriented, NAD  blister on medial aspect of R groin, rest of skin inspected after taking down KI and dsg, no other blisters appreciated   R leg dsg c/d/i, in KI   Pulses: 2+ DP  Sensation: SILT grossly SPN/DPN/Saph/Lizeth/Tib  Motor: 5/5 TA/GS/EHL,    A/P: 78yFemale s/p R femur retrograde nail by Dr. LINDA Jeffrey on 01-01  - Stable  - holding home spironolactone periop   - bacitracin given for medial groin, continue observation of skin   - Pain Control  - DVT ppx: ASA   - Post op abx: Ancef  - WBS: WBAT  - PT eval - [facility]     Ortho Pager [phone]     Procedure:  R femur retrograde nail   Surgeon: Dr. LINDA Jeffrey    continues to make progress w/ PT, able to walk from edge of bed to door yday with PT. she has developed a blister in her medial groin on RLE   Denies CP, SOB, N/V, numbness/tingling     Vital Signs Last 24 Hrs  T(C): 36.7 (03 Jan 2024 05:10), Max: 36.8 (02 Jan 2024 20:20)  T(F): 98.1 (03 Jan 2024 05:10), Max: 98.2 (02 Jan 2024 20:20)  HR: 86 (03 Jan 2024 05:10) (73 - 98)  BP: 112/64 (03 Jan 2024 05:10) (107/56 - 117/58)  BP(mean): --  RR: 17 (03 Jan 2024 05:10) (17 - 18)  SpO2: 97% (03 Jan 2024 05:10) (95% - 97%)    Parameters below as of 03 Jan 2024 05:10  Patient On (Oxygen Delivery Method): room air        General: Pt Alert and oriented, NAD  blister on medial aspect of R groin, rest of skin inspected after taking down KI and dsg, no other blisters appreciated   R leg dsg c/d/i, in KI   Pulses: 2+ DP  Sensation: SILT grossly SPN/DPN/Saph/Lizeth/Tib  Motor: 5/5 TA/GS/EHL,    A/P: 78yFemale s/p R femur retrograde nail by Dr. LINDA Jeffrey on 01-01  - Stable  - holding home spironolactone periop   - bacitracin given for medial groin, continue observation of skin   - Pain Control  - DVT ppx: ASA   - Post op abx: Ancef  - WBS: WBAT  - PT eval - [facility]     Ortho Pager [phone]

## 2024-01-05 NOTE — PROGRESS NOTE ADULT - ASSESSMENT
78y Female w/ PMHx HLD, and LE edema (on spironolactone), PSH L TKA 2016 w/ Dr. Posadas, L fore foot reconstruction Dr. Boswell 2021, presents after fall, found to have right distal femur fracture, admitted to orthopedic surgery. Medicine consulted for pre-op evaluation for ORIF R distal femur.        # Right Distal Femur Fracture   - S/p ORIF on 1/1/24   - pain control , DVT prophylaxis , Weightbearing status , Dressing changes and drain care per ortho team      # Acute Blood loss Anemia Complicating Iron Deficiency Anemia   - Hgb 7gm/dl on 1/3/24, 6.8 on 1/5/24  , Patient does not want blood transfusion   - IV IRon 300mg TID for 3 days     # HLD   - Atorvastatin     # LE Edema   - Spironolactone     # DVT prophylaxis per Ortho       DISPO : Discharge To [facility] Once patients HGB remains stable Between 6.5 and 7 for 24 hours    78y Female w/ PMHx HLD, and LE edema (on spironolactone), PSH L TKA 2016 w/ Dr. Posadas, L fore foot reconstruction Dr. Boswell 2021, presents after fall, found to have right distal femur fracture, admitted to orthopedic surgery. Medicine consulted for pre-op evaluation for ORIF R distal femur.        # Right Distal Femur Fracture   - S/p ORIF on 1/1/24   - pain control , DVT prophylaxis , Weightbearing status , Dressing changes and drain care per ortho team      # Acute Blood loss Anemia Complicating Iron Deficiency Anemia   - Hgb 7gm/dl on 1/3/24, 6.8 on 1/5/24  , Patient does not want blood transfusion   - IV IRon 300mg TID for 3 days     # HLD   - Atorvastatin     # LE Edema   - Spironolactone     # DVT prophylaxis per Ortho       DISPO : Discharge To [facility] Once patients HGB remains stable Between 6.5 and 7 for 24 hours

## 2024-01-05 NOTE — PROVIDER CONTACT NOTE (CRITICAL VALUE NOTIFICATION) - SITUATION
Pt is asymptomatic. VSS.
Laboratory called to report patient's hemoglobin level is at critical value of 7.0 from morning blood draw.
Pt refusing blood transfusion
hemoglobin 7.8
Hematology called to report patient's hemoglobin level is at a critical value of 6.8.

## 2024-01-06 LAB
ANION GAP SERPL CALC-SCNC: 9 MMOL/L — SIGNIFICANT CHANGE UP (ref 5–17)
ANION GAP SERPL CALC-SCNC: 9 MMOL/L — SIGNIFICANT CHANGE UP (ref 5–17)
BUN SERPL-MCNC: 12 MG/DL — SIGNIFICANT CHANGE UP (ref 7–23)
BUN SERPL-MCNC: 12 MG/DL — SIGNIFICANT CHANGE UP (ref 7–23)
CALCIUM SERPL-MCNC: 9 MG/DL — SIGNIFICANT CHANGE UP (ref 8.4–10.5)
CALCIUM SERPL-MCNC: 9 MG/DL — SIGNIFICANT CHANGE UP (ref 8.4–10.5)
CHLORIDE SERPL-SCNC: 105 MMOL/L — SIGNIFICANT CHANGE UP (ref 96–108)
CHLORIDE SERPL-SCNC: 105 MMOL/L — SIGNIFICANT CHANGE UP (ref 96–108)
CO2 SERPL-SCNC: 24 MMOL/L — SIGNIFICANT CHANGE UP (ref 22–31)
CO2 SERPL-SCNC: 24 MMOL/L — SIGNIFICANT CHANGE UP (ref 22–31)
CREAT SERPL-MCNC: 0.72 MG/DL — SIGNIFICANT CHANGE UP (ref 0.5–1.3)
CREAT SERPL-MCNC: 0.72 MG/DL — SIGNIFICANT CHANGE UP (ref 0.5–1.3)
EGFR: 84 ML/MIN/1.73M2 — SIGNIFICANT CHANGE UP
EGFR: 84 ML/MIN/1.73M2 — SIGNIFICANT CHANGE UP
GLUCOSE SERPL-MCNC: 108 MG/DL — HIGH (ref 70–99)
GLUCOSE SERPL-MCNC: 108 MG/DL — HIGH (ref 70–99)
HCT VFR BLD CALC: 21.5 % — LOW (ref 34.5–45)
HCT VFR BLD CALC: 21.5 % — LOW (ref 34.5–45)
HGB BLD-MCNC: 6.9 G/DL — CRITICAL LOW (ref 11.5–15.5)
HGB BLD-MCNC: 6.9 G/DL — CRITICAL LOW (ref 11.5–15.5)
MCHC RBC-ENTMCNC: 28.8 PG — SIGNIFICANT CHANGE UP (ref 27–34)
MCHC RBC-ENTMCNC: 28.8 PG — SIGNIFICANT CHANGE UP (ref 27–34)
MCHC RBC-ENTMCNC: 32.1 GM/DL — SIGNIFICANT CHANGE UP (ref 32–36)
MCHC RBC-ENTMCNC: 32.1 GM/DL — SIGNIFICANT CHANGE UP (ref 32–36)
MCV RBC AUTO: 89.6 FL — SIGNIFICANT CHANGE UP (ref 80–100)
MCV RBC AUTO: 89.6 FL — SIGNIFICANT CHANGE UP (ref 80–100)
NRBC # BLD: 0 /100 WBCS — SIGNIFICANT CHANGE UP (ref 0–0)
NRBC # BLD: 0 /100 WBCS — SIGNIFICANT CHANGE UP (ref 0–0)
PLATELET # BLD AUTO: 275 K/UL — SIGNIFICANT CHANGE UP (ref 150–400)
PLATELET # BLD AUTO: 275 K/UL — SIGNIFICANT CHANGE UP (ref 150–400)
POTASSIUM SERPL-MCNC: 4.1 MMOL/L — SIGNIFICANT CHANGE UP (ref 3.5–5.3)
POTASSIUM SERPL-MCNC: 4.1 MMOL/L — SIGNIFICANT CHANGE UP (ref 3.5–5.3)
POTASSIUM SERPL-SCNC: 4.1 MMOL/L — SIGNIFICANT CHANGE UP (ref 3.5–5.3)
POTASSIUM SERPL-SCNC: 4.1 MMOL/L — SIGNIFICANT CHANGE UP (ref 3.5–5.3)
RBC # BLD: 2.4 M/UL — LOW (ref 3.8–5.2)
RBC # BLD: 2.4 M/UL — LOW (ref 3.8–5.2)
RBC # FLD: 14.3 % — SIGNIFICANT CHANGE UP (ref 10.3–14.5)
RBC # FLD: 14.3 % — SIGNIFICANT CHANGE UP (ref 10.3–14.5)
SODIUM SERPL-SCNC: 138 MMOL/L — SIGNIFICANT CHANGE UP (ref 135–145)
SODIUM SERPL-SCNC: 138 MMOL/L — SIGNIFICANT CHANGE UP (ref 135–145)
WBC # BLD: 7.47 K/UL — SIGNIFICANT CHANGE UP (ref 3.8–10.5)
WBC # BLD: 7.47 K/UL — SIGNIFICANT CHANGE UP (ref 3.8–10.5)
WBC # FLD AUTO: 7.47 K/UL — SIGNIFICANT CHANGE UP (ref 3.8–10.5)
WBC # FLD AUTO: 7.47 K/UL — SIGNIFICANT CHANGE UP (ref 3.8–10.5)

## 2024-01-06 PROCEDURE — 99232 SBSQ HOSP IP/OBS MODERATE 35: CPT

## 2024-01-06 RX ORDER — POLYETHYLENE GLYCOL 3350 17 G/17G
17 POWDER, FOR SOLUTION ORAL
Qty: 0 | Refills: 0 | DISCHARGE
Start: 2024-01-06

## 2024-01-06 RX ORDER — FAMOTIDINE 10 MG/ML
1 INJECTION INTRAVENOUS
Qty: 0 | Refills: 0 | DISCHARGE
Start: 2024-01-06

## 2024-01-06 RX ORDER — MINERAL OIL
30 OIL (ML) MISCELLANEOUS DAILY
Refills: 0 | Status: DISCONTINUED | OUTPATIENT
Start: 2024-01-06 | End: 2024-01-08

## 2024-01-06 RX ORDER — BACITRACIN ZINC 500 UNIT/G
1 OINTMENT IN PACKET (EA) TOPICAL
Qty: 0 | Refills: 0 | DISCHARGE
Start: 2024-01-06

## 2024-01-06 RX ADMIN — Medication 1 TABLET(S): at 11:47

## 2024-01-06 RX ADMIN — Medication 81 MILLIGRAM(S): at 17:36

## 2024-01-06 RX ADMIN — FAMOTIDINE 20 MILLIGRAM(S): 10 INJECTION INTRAVENOUS at 05:29

## 2024-01-06 RX ADMIN — Medication 1 APPLICATION(S): at 05:29

## 2024-01-06 RX ADMIN — ATORVASTATIN CALCIUM 10 MILLIGRAM(S): 80 TABLET, FILM COATED ORAL at 21:31

## 2024-01-06 RX ADMIN — SPIRONOLACTONE 25 MILLIGRAM(S): 25 TABLET, FILM COATED ORAL at 05:29

## 2024-01-06 RX ADMIN — Medication 81 MILLIGRAM(S): at 05:29

## 2024-01-06 RX ADMIN — FAMOTIDINE 20 MILLIGRAM(S): 10 INJECTION INTRAVENOUS at 17:37

## 2024-01-06 RX ADMIN — IRON SUCROSE 176.67 MILLIGRAM(S): 20 INJECTION, SOLUTION INTRAVENOUS at 16:00

## 2024-01-06 RX ADMIN — HYDROMORPHONE HYDROCHLORIDE 0.5 MILLIGRAM(S): 2 INJECTION INTRAMUSCULAR; INTRAVENOUS; SUBCUTANEOUS at 14:30

## 2024-01-06 RX ADMIN — Medication 1 APPLICATION(S): at 18:35

## 2024-01-06 RX ADMIN — HYDROMORPHONE HYDROCHLORIDE 0.5 MILLIGRAM(S): 2 INJECTION INTRAMUSCULAR; INTRAVENOUS; SUBCUTANEOUS at 14:08

## 2024-01-06 NOTE — PROGRESS NOTE ADULT - ASSESSMENT
78y Female w/ PMHx HLD, and LE edema (on spironolactone), PSH L TKA 2016 w/ Dr. Posadas, L fore foot reconstruction Dr. Boswell 2021, presents after fall, found to have right distal femur fracture, admitted to orthopedic surgery. Medicine consulted for pre-op evaluation for ORIF R distal femur.        # Right Distal Femur Fracture   - S/p ORIF on 1/1/24   - pain control , DVT prophylaxis , Weightbearing status , Dressing changes and drain care per ortho team      # Acute Blood loss Anemia Complicating Iron Deficiency Anemia   - Hgb 7gm/dl on 1/3/24, 6.8 on 1/5/24  , Patient does not want blood transfusion   - IV IRon 300mg TID for 3 days     # HLD   - Atorvastatin     # LE Edema   - Spironolactone     # DVT prophylaxis per Ortho       DISPO : Discharge To [facility] Once patients HGB remains stable Between 6.5 and 7 for 24 hours    78y Female w/ PMHx HLD, and LE edema (on spironolactone), PSH L TKA 2016 w/ Dr. Posadas, L fore foot reconstruction Dr. Boswell 2021, presents after fall, found to have right distal femur fracture, admitted to orthopedic surgery. Medicine consulted for pre-op evaluation for ORIF R distal femur.        # Right Distal Femur Fracture   - S/p ORIF on 1/1/24   - pain control , DVT prophylaxis , Weightbearing status , Dressing changes and drain care per ortho team      # Acute Blood loss Anemia Complicating Iron Deficiency Anemia   - Hgb 7gm/dl on 1/3/24, 6.8 on 1/5/24  , Patient does not want blood transfusion   - IV IRon 300mg TID for 3 days     # HLD   - Atorvastatin     # LE Edema   - Spironolactone     # DVT prophylaxis per Ortho       DISPO : Discharge To [facility] Once patients HGB remains stable Between 6.5 and 7 for 24 hours    78y Female w/ PMHx HLD, and LE edema (on spironolactone), PSH L TKA 2016 w/ Dr. Posadas, L fore foot reconstruction Dr. Boswell 2021, presents after fall, found to have right distal femur fracture, admitted to orthopedic surgery s/p ORIF R distal femur.    # Right Distal Femur Fracture   - S/p ORIF on 1/1/24   - pain control , DVT prophylaxis , Weightbearing status , Dressing changes and drain care per ortho team      # Acute Blood loss Anemia Complicating Iron Deficiency Anemia   - Hgb 6.9, patient appears asymptomatic and continues to decline transfusion   - venofer 300mg x 3 days, finishing today     # HLD   - Atorvastatin     # LE Edema   - Spironolactone     # DVT prophylaxis per Ortho       DISPO : pending NAOMI

## 2024-01-06 NOTE — PROGRESS NOTE ADULT - SUBJECTIVE AND OBJECTIVE BOX
Ortho Progress Note    Procedure:  R femur retrograde nail   Surgeon: Dr. LINDA Jeffrey    Slow improvement with PT yesterday. No complaints this AM.  Denies CP, SOB, N/V, numbness/tingling     Vital Signs Last 24 Hrs  T(C): 36.7 (03 Jan 2024 05:10), Max: 36.8 (02 Jan 2024 20:20)  T(F): 98.1 (03 Jan 2024 05:10), Max: 98.2 (02 Jan 2024 20:20)  HR: 86 (03 Jan 2024 05:10) (73 - 98)  BP: 112/64 (03 Jan 2024 05:10) (107/56 - 117/58)  BP(mean): --  RR: 17 (03 Jan 2024 05:10) (17 - 18)  SpO2: 97% (03 Jan 2024 05:10) (95% - 97%)    Parameters below as of 03 Jan 2024 05:10  Patient On (Oxygen Delivery Method): room air    General: Pt Alert and oriented, NAD  blister on medial aspect of R groin, rest of skin inspected after taking down KI and dsg, no other blisters appreciated   R leg dsg c/d/i, in KI   Pulses: 2+ DP  Sensation: SILT grossly SPN/DPN/Saph/Lizeth/Tib  Motor: 5/5 TA/GS/EHL,    A/P: 78yFemale s/p R femur retrograde nail by Dr. LINDA Jeffrey on 01-01  - Stable  - holding home spironolactone periop   - bacitracin given for medial groin, continue observation of skin   - Monitor Hgb, transfuse as needed however pt refusing  - Pain Control  - DVT ppx: ASA   - Post op abx: Ancef  - WBS: WBAT  - PT eval - NAOMI     Ortho Pager [phone] Ortho Progress Note    Procedure:  R femur retrograde nail   Surgeon: Dr. LINDA Jeffrey    Slow improvement with PT yesterday. No complaints this AM.  Denies CP, SOB, N/V, numbness/tingling     Vital Signs Last 24 Hrs  T(C): 36.7 (03 Jan 2024 05:10), Max: 36.8 (02 Jan 2024 20:20)  T(F): 98.1 (03 Jan 2024 05:10), Max: 98.2 (02 Jan 2024 20:20)  HR: 86 (03 Jan 2024 05:10) (73 - 98)  BP: 112/64 (03 Jan 2024 05:10) (107/56 - 117/58)  BP(mean): --  RR: 17 (03 Jan 2024 05:10) (17 - 18)  SpO2: 97% (03 Jan 2024 05:10) (95% - 97%)    Parameters below as of 03 Jan 2024 05:10  Patient On (Oxygen Delivery Method): room air    General: Pt Alert and oriented, NAD  blister on medial aspect of R groin, rest of skin inspected after taking down KI and dsg, no other blisters appreciated   R leg dsg c/d/i, in KI   Pulses: 2+ DP  Sensation: SILT grossly SPN/DPN/Saph/Lizeth/Tib  Motor: 5/5 TA/GS/EHL,    A/P: 78yFemale s/p R femur retrograde nail by Dr. LINDA Jeffrey on 01-01  - Stable  - holding home spironolactone periop   - bacitracin given for medial groin, continue observation of skin   - Monitor Hgb, transfuse as needed however pt refusing  - Pain Control  - DVT ppx: ASA   - Post op abx: Ancef  - WBS: WBAT  - PT eval - NAOMI     Ortho Pager [phone]

## 2024-01-06 NOTE — DIETITIAN INITIAL EVALUATION ADULT - OTHER INFO
78y Female w/ PMHx HLD, and LE edema (on spironolactone), PSH L TKA 2016 w/ Dr. Posadas, L fore foot reconstruction Dr. Boswell 2021, presents after fall, found to have right distal femur fracture, admitted to orthopedic surgery s/p ORIF R distal femur on 1/1. Pt seen in room, awake, alert, pleasant, breathing on room air. Reports a good appetite at home, no personal food preferences, NKFA. Currently w/good appetite and PO intake. No complaints of N/V, +flatus, -BM. Pain well controlled at this time. Skin noted with leg surgical wound, no edema. Afebrile. Encouraged PO intake w/focus on protein and fluids. Will continue to follow per RD protocol.

## 2024-01-06 NOTE — DIETITIAN INITIAL EVALUATION ADULT - ADD RECOMMEND
1. Encourage PO intake   2. Monitor lytes and replete prn.   3. Pain and bowel regimens per team   4. Continue MVI

## 2024-01-06 NOTE — DIETITIAN INITIAL EVALUATION ADULT - PERTINENT LABORATORY DATA
01-06    138  |  105  |  12  ----------------------------<  108<H>  4.1   |  24  |  0.72    Ca    9.0      06 Jan 2024 05:30    A1C with Estimated Average Glucose Result: 5.7 % (01-01-24 @ 02:35)

## 2024-01-06 NOTE — DIETITIAN INITIAL EVALUATION ADULT - PERTINENT MEDS FT
MEDICATIONS  (STANDING):  acetaminophen     Tablet .. 1000 milliGRAM(s) Oral every 8 hours  aspirin  chewable 81 milliGRAM(s) Oral two times a day  atorvastatin 10 milliGRAM(s) Oral at bedtime  bacitracin   Ointment 1 Application(s) Topical two times a day  famotidine    Tablet 20 milliGRAM(s) Oral two times a day  lactated ringers. 1000 milliLiter(s) (100 mL/Hr) IV Continuous <Continuous>  multivitamin 1 Tablet(s) Oral daily  polyethylene glycol 3350 17 Gram(s) Oral at bedtime  povidone iodine 5% Nasal Swab 1 Application(s) Both Nostrils once  senna 2 Tablet(s) Oral at bedtime  sodium chloride 0.9%. 1000 milliLiter(s) (125 mL/Hr) IV Continuous <Continuous>  spironolactone 25 milliGRAM(s) Oral two times a day    MEDICATIONS  (PRN):  calcium carbonate    500 mG (Tums) Chewable 1 Tablet(s) Chew every 6 hours PRN Indigestion  HYDROmorphone   Tablet 2 milliGRAM(s) Oral every 4 hours PRN Moderate Pain (4 - 6)  HYDROmorphone   Tablet 4 milliGRAM(s) Oral every 4 hours PRN Severe Pain (7 - 10)  HYDROmorphone  Injectable 0.5 milliGRAM(s) IV Push every 15 minutes PRN breakthrough  HYDROmorphone  Injectable 0.5 milliGRAM(s) IV Push every 6 hours PRN breakthrough  magnesium hydroxide Suspension 30 milliLiter(s) Oral daily PRN Constipation  magnesium hydroxide Suspension 30 milliLiter(s) Oral daily PRN Constipation  melatonin 5 milliGRAM(s) Oral at bedtime PRN Sleep  ondansetron Injectable 4 milliGRAM(s) IV Push every 6 hours PRN Nausea and/or Vomiting

## 2024-01-06 NOTE — PROGRESS NOTE ADULT - SUBJECTIVE AND OBJECTIVE BOX
Patient is a 78y old  Female who presents with a chief complaint of R distal femur fx (02 Jan 2024 15:07)        SUBJECTIVE:  Patient was seen and examined at bedside.    Overnight Events : S/p ORIF on 1/1/24 , resting in bed , complaining of pain - Right Leg , no lightheadedness       Diet, Regular (01-01-24 @ 08:19) [Active]      MEDICATIONS:  MEDICATIONS  (STANDING):  acetaminophen     Tablet .. 1000 milliGRAM(s) Oral every 8 hours  aspirin  chewable 81 milliGRAM(s) Oral two times a day  atorvastatin 10 milliGRAM(s) Oral at bedtime  bacitracin   Ointment 1 Application(s) Topical two times a day  famotidine    Tablet 20 milliGRAM(s) Oral two times a day  iron sucrose IVPB 300 milliGRAM(s) IV Intermittent every 24 hours  lactated ringers. 1000 milliLiter(s) (100 mL/Hr) IV Continuous <Continuous>  multivitamin 1 Tablet(s) Oral daily  polyethylene glycol 3350 17 Gram(s) Oral at bedtime  povidone iodine 5% Nasal Swab 1 Application(s) Both Nostrils once  senna 2 Tablet(s) Oral at bedtime  sodium chloride 0.9%. 1000 milliLiter(s) (125 mL/Hr) IV Continuous <Continuous>  spironolactone 25 milliGRAM(s) Oral two times a day    MEDICATIONS  (PRN):  calcium carbonate    500 mG (Tums) Chewable 1 Tablet(s) Chew every 6 hours PRN Indigestion  HYDROmorphone  Injectable 0.5 milliGRAM(s) IV Push every 15 minutes PRN breakthrough  HYDROmorphone  Injectable 0.5 milliGRAM(s) IV Push every 6 hours PRN breakthrough  magnesium hydroxide Suspension 30 milliLiter(s) Oral daily PRN Constipation  magnesium hydroxide Suspension 30 milliLiter(s) Oral daily PRN Constipation  melatonin 5 milliGRAM(s) Oral at bedtime PRN Sleep  ondansetron Injectable 4 milliGRAM(s) IV Push every 6 hours PRN Nausea and/or Vomiting  oxyCODONE    IR 10 milliGRAM(s) Oral every 4 hours PRN Severe Pain (7 - 10)  oxyCODONE    IR 5 milliGRAM(s) Oral every 4 hours PRN Moderate Pain (4 - 6)        Allergies    No Known Allergies    Intolerances        OBJECTIVE:  Vital Signs Last 24 Hrs  T(C): 36.9 (05 Jan 2024 08:56), Max: 36.9 (05 Jan 2024 08:56)  T(F): 98.4 (05 Jan 2024 08:56), Max: 98.4 (05 Jan 2024 08:56)  HR: 85 (05 Jan 2024 08:56) (85 - 97)  BP: 119/73 (05 Jan 2024 08:56) (106/57 - 144/82)  BP(mean): --  RR: 16 (05 Jan 2024 08:56) (16 - 17)  SpO2: 97% (05 Jan 2024 08:56) (95% - 97%)    Parameters below as of 05 Jan 2024 08:56  Patient On (Oxygen Delivery Method): room air                PHYSICAL EXAM:  Gen: Resting in bed at time of exam, not in distress   HEENT: moist mucosa, no lesions   Neck: supple, trachea at midline  CV: RRR, +S1/S2  Pulm: no wheezing , no crackles  no increase in work of breathing  Abd: soft, NTND  Skin:  Small Fluid Filled/Tense vesicles on right Thight/Groin  with mild erythema , non tender , not warm   Ext:  Right leg and knee pain   Neuro: AOx3, no gross focal neurological deficits  Psych: affect and behavior appropriate, pleasant at time of interview    LABS:                                                         6.8    6.82  )-----------( 225      ( 05 Jan 2024 07:26 )             21.4     01-05    135  |  102  |  12  ----------------------------<  134<H>  3.8   |  26  |  0.71    Ca    8.5      05 Jan 2024 07:26                  MICRODATA:    Urinalysis with Rflx Culture (collected 01 Jan 2024 01:41)        RADIOLOGY/OTHER STUDIES:             Patient is a 78y old  Female who presents with a chief complaint of R distal femur fx (02 Jan 2024 15:07)        SUBJECTIVE:  Patient was seen and examined at bedside.  No complaints  Continues to express lack of interest in PRBC transfusion       Diet, Regular (01-01-24 @ 08:19) [Active]      MEDICATIONS:  MEDICATIONS  (STANDING):  acetaminophen     Tablet .. 1000 milliGRAM(s) Oral every 8 hours  aspirin  chewable 81 milliGRAM(s) Oral two times a day  atorvastatin 10 milliGRAM(s) Oral at bedtime  bacitracin   Ointment 1 Application(s) Topical two times a day  famotidine    Tablet 20 milliGRAM(s) Oral two times a day  iron sucrose IVPB 300 milliGRAM(s) IV Intermittent every 24 hours  lactated ringers. 1000 milliLiter(s) (100 mL/Hr) IV Continuous <Continuous>  multivitamin 1 Tablet(s) Oral daily  polyethylene glycol 3350 17 Gram(s) Oral at bedtime  povidone iodine 5% Nasal Swab 1 Application(s) Both Nostrils once  senna 2 Tablet(s) Oral at bedtime  sodium chloride 0.9%. 1000 milliLiter(s) (125 mL/Hr) IV Continuous <Continuous>  spironolactone 25 milliGRAM(s) Oral two times a day    MEDICATIONS  (PRN):  calcium carbonate    500 mG (Tums) Chewable 1 Tablet(s) Chew every 6 hours PRN Indigestion  HYDROmorphone  Injectable 0.5 milliGRAM(s) IV Push every 15 minutes PRN breakthrough  HYDROmorphone  Injectable 0.5 milliGRAM(s) IV Push every 6 hours PRN breakthrough  magnesium hydroxide Suspension 30 milliLiter(s) Oral daily PRN Constipation  magnesium hydroxide Suspension 30 milliLiter(s) Oral daily PRN Constipation  melatonin 5 milliGRAM(s) Oral at bedtime PRN Sleep  ondansetron Injectable 4 milliGRAM(s) IV Push every 6 hours PRN Nausea and/or Vomiting  oxyCODONE    IR 10 milliGRAM(s) Oral every 4 hours PRN Severe Pain (7 - 10)  oxyCODONE    IR 5 milliGRAM(s) Oral every 4 hours PRN Moderate Pain (4 - 6)        Allergies    No Known Allergies    Intolerances        OBJECTIVE:  Vital Signs Last 24 Hrs  T(C): 36.9 (05 Jan 2024 08:56), Max: 36.9 (05 Jan 2024 08:56)  T(F): 98.4 (05 Jan 2024 08:56), Max: 98.4 (05 Jan 2024 08:56)  HR: 85 (05 Jan 2024 08:56) (85 - 97)  BP: 119/73 (05 Jan 2024 08:56) (106/57 - 144/82)  BP(mean): --  RR: 16 (05 Jan 2024 08:56) (16 - 17)  SpO2: 97% (05 Jan 2024 08:56) (95% - 97%)    Parameters below as of 05 Jan 2024 08:56  Patient On (Oxygen Delivery Method): room air                PHYSICAL EXAM:  Gen: Resting in bed at time of exam, not in distress   HEENT: moist mucosa, no lesions   Neck: supple, trachea at midline  CV: RRR, +S1/S2  Pulm: no wheezing , no crackles  no increase in work of breathing  Abd: soft, NTND  Ext:  Right leg and knee pain   Neuro: AOx3, no gross focal neurological deficits  Psych: affect and behavior appropriate, pleasant at time of interview    LABS:                                                         6.8    6.82  )-----------( 225      ( 05 Jan 2024 07:26 )             21.4     01-05    135  |  102  |  12  ----------------------------<  134<H>  3.8   |  26  |  0.71    Ca    8.5      05 Jan 2024 07:26                  MICRODATA:    Urinalysis with Rflx Culture (collected 01 Jan 2024 01:41)        RADIOLOGY/OTHER STUDIES:

## 2024-01-07 LAB
ANION GAP SERPL CALC-SCNC: 8 MMOL/L — SIGNIFICANT CHANGE UP (ref 5–17)
ANION GAP SERPL CALC-SCNC: 8 MMOL/L — SIGNIFICANT CHANGE UP (ref 5–17)
BUN SERPL-MCNC: 17 MG/DL — SIGNIFICANT CHANGE UP (ref 7–23)
BUN SERPL-MCNC: 17 MG/DL — SIGNIFICANT CHANGE UP (ref 7–23)
CALCIUM SERPL-MCNC: 8.8 MG/DL — SIGNIFICANT CHANGE UP (ref 8.4–10.5)
CALCIUM SERPL-MCNC: 8.8 MG/DL — SIGNIFICANT CHANGE UP (ref 8.4–10.5)
CHLORIDE SERPL-SCNC: 104 MMOL/L — SIGNIFICANT CHANGE UP (ref 96–108)
CHLORIDE SERPL-SCNC: 104 MMOL/L — SIGNIFICANT CHANGE UP (ref 96–108)
CO2 SERPL-SCNC: 25 MMOL/L — SIGNIFICANT CHANGE UP (ref 22–31)
CO2 SERPL-SCNC: 25 MMOL/L — SIGNIFICANT CHANGE UP (ref 22–31)
CREAT SERPL-MCNC: 0.81 MG/DL — SIGNIFICANT CHANGE UP (ref 0.5–1.3)
CREAT SERPL-MCNC: 0.81 MG/DL — SIGNIFICANT CHANGE UP (ref 0.5–1.3)
EGFR: 73 ML/MIN/1.73M2 — SIGNIFICANT CHANGE UP
EGFR: 73 ML/MIN/1.73M2 — SIGNIFICANT CHANGE UP
GLUCOSE SERPL-MCNC: 101 MG/DL — HIGH (ref 70–99)
GLUCOSE SERPL-MCNC: 101 MG/DL — HIGH (ref 70–99)
HCT VFR BLD CALC: 21.9 % — LOW (ref 34.5–45)
HCT VFR BLD CALC: 21.9 % — LOW (ref 34.5–45)
HGB BLD-MCNC: 7.1 G/DL — LOW (ref 11.5–15.5)
HGB BLD-MCNC: 7.1 G/DL — LOW (ref 11.5–15.5)
MCHC RBC-ENTMCNC: 28.9 PG — SIGNIFICANT CHANGE UP (ref 27–34)
MCHC RBC-ENTMCNC: 28.9 PG — SIGNIFICANT CHANGE UP (ref 27–34)
MCHC RBC-ENTMCNC: 32.4 GM/DL — SIGNIFICANT CHANGE UP (ref 32–36)
MCHC RBC-ENTMCNC: 32.4 GM/DL — SIGNIFICANT CHANGE UP (ref 32–36)
MCV RBC AUTO: 89 FL — SIGNIFICANT CHANGE UP (ref 80–100)
MCV RBC AUTO: 89 FL — SIGNIFICANT CHANGE UP (ref 80–100)
NRBC # BLD: 0 /100 WBCS — SIGNIFICANT CHANGE UP (ref 0–0)
NRBC # BLD: 0 /100 WBCS — SIGNIFICANT CHANGE UP (ref 0–0)
PLATELET # BLD AUTO: 316 K/UL — SIGNIFICANT CHANGE UP (ref 150–400)
PLATELET # BLD AUTO: 316 K/UL — SIGNIFICANT CHANGE UP (ref 150–400)
POTASSIUM SERPL-MCNC: 4.2 MMOL/L — SIGNIFICANT CHANGE UP (ref 3.5–5.3)
POTASSIUM SERPL-MCNC: 4.2 MMOL/L — SIGNIFICANT CHANGE UP (ref 3.5–5.3)
POTASSIUM SERPL-SCNC: 4.2 MMOL/L — SIGNIFICANT CHANGE UP (ref 3.5–5.3)
POTASSIUM SERPL-SCNC: 4.2 MMOL/L — SIGNIFICANT CHANGE UP (ref 3.5–5.3)
RBC # BLD: 2.46 M/UL — LOW (ref 3.8–5.2)
RBC # BLD: 2.46 M/UL — LOW (ref 3.8–5.2)
RBC # FLD: 14.3 % — SIGNIFICANT CHANGE UP (ref 10.3–14.5)
RBC # FLD: 14.3 % — SIGNIFICANT CHANGE UP (ref 10.3–14.5)
SODIUM SERPL-SCNC: 137 MMOL/L — SIGNIFICANT CHANGE UP (ref 135–145)
SODIUM SERPL-SCNC: 137 MMOL/L — SIGNIFICANT CHANGE UP (ref 135–145)
WBC # BLD: 7.06 K/UL — SIGNIFICANT CHANGE UP (ref 3.8–10.5)
WBC # BLD: 7.06 K/UL — SIGNIFICANT CHANGE UP (ref 3.8–10.5)
WBC # FLD AUTO: 7.06 K/UL — SIGNIFICANT CHANGE UP (ref 3.8–10.5)
WBC # FLD AUTO: 7.06 K/UL — SIGNIFICANT CHANGE UP (ref 3.8–10.5)

## 2024-01-07 PROCEDURE — 99232 SBSQ HOSP IP/OBS MODERATE 35: CPT

## 2024-01-07 RX ADMIN — HYDROMORPHONE HYDROCHLORIDE 4 MILLIGRAM(S): 2 INJECTION INTRAMUSCULAR; INTRAVENOUS; SUBCUTANEOUS at 21:40

## 2024-01-07 RX ADMIN — HYDROMORPHONE HYDROCHLORIDE 4 MILLIGRAM(S): 2 INJECTION INTRAMUSCULAR; INTRAVENOUS; SUBCUTANEOUS at 22:40

## 2024-01-07 RX ADMIN — Medication 1 TABLET(S): at 11:33

## 2024-01-07 RX ADMIN — FAMOTIDINE 20 MILLIGRAM(S): 10 INJECTION INTRAVENOUS at 17:11

## 2024-01-07 RX ADMIN — FAMOTIDINE 20 MILLIGRAM(S): 10 INJECTION INTRAVENOUS at 05:32

## 2024-01-07 RX ADMIN — ATORVASTATIN CALCIUM 10 MILLIGRAM(S): 80 TABLET, FILM COATED ORAL at 21:40

## 2024-01-07 RX ADMIN — Medication 1 APPLICATION(S): at 05:32

## 2024-01-07 RX ADMIN — HYDROMORPHONE HYDROCHLORIDE 2 MILLIGRAM(S): 2 INJECTION INTRAMUSCULAR; INTRAVENOUS; SUBCUTANEOUS at 16:12

## 2024-01-07 RX ADMIN — Medication 81 MILLIGRAM(S): at 05:32

## 2024-01-07 RX ADMIN — HYDROMORPHONE HYDROCHLORIDE 2 MILLIGRAM(S): 2 INJECTION INTRAMUSCULAR; INTRAVENOUS; SUBCUTANEOUS at 17:14

## 2024-01-07 RX ADMIN — Medication 1 APPLICATION(S): at 17:11

## 2024-01-07 NOTE — PROGRESS NOTE ADULT - SUBJECTIVE AND OBJECTIVE BOX
Ortho Progress Note    Procedure:  R femur retrograde nail   Surgeon: Dr. LINDA Jeffrey    No complaints. Wants to discuss with family regarding NAOMI  Denies CP, SOB, N/V, numbness/tingling     Vital Signs Last 24 Hrs  T(C): 37 (07 Jan 2024 05:02), Max: 37 (07 Jan 2024 05:02)  T(F): 98.6 (07 Jan 2024 05:02), Max: 98.6 (07 Jan 2024 05:02)  HR: 97 (07 Jan 2024 05:02) (84 - 97)  BP: 134/75 (07 Jan 2024 05:02) (108/67 - 134/75)  BP(mean): --  RR: 16 (07 Jan 2024 05:02) (16 - 17)  SpO2: 96% (07 Jan 2024 05:02) (94% - 97%)    Parameters below as of 07 Jan 2024 05:02  Patient On (Oxygen Delivery Method): room air      General: Pt Alert and oriented, NAD  blister on medial aspect of R groin, rest of skin inspected after taking down KI and dsg, no other blisters appreciated   R leg dsg c/d/i, in KI   Pulses: 2+ DP  Sensation: SILT grossly SPN/DPN/Saph/Lizeth/Tib  Motor: 5/5 TA/GS/EHL,    A/P: 78yFemale s/p R femur retrograde nail by Dr. LINDA Jeffrey on 01-01  - Stable  - holding home spironolactone periop   - bacitracin given for medial groin, continue observation of skin   - Monitor Hgb, transfuse as needed however pt refusing  - Pain Control  - DVT ppx: ASA   - WBS: WBAT  - PT eval - [facility]     Ortho Pager [phone] Ortho Progress Note    Procedure:  R femur retrograde nail   Surgeon: Dr. LINDA Jeffrey    No complaints. Wants to discuss with family regarding NAOMI  Denies CP, SOB, N/V, numbness/tingling     Vital Signs Last 24 Hrs  T(C): 37 (07 Jan 2024 05:02), Max: 37 (07 Jan 2024 05:02)  T(F): 98.6 (07 Jan 2024 05:02), Max: 98.6 (07 Jan 2024 05:02)  HR: 97 (07 Jan 2024 05:02) (84 - 97)  BP: 134/75 (07 Jan 2024 05:02) (108/67 - 134/75)  BP(mean): --  RR: 16 (07 Jan 2024 05:02) (16 - 17)  SpO2: 96% (07 Jan 2024 05:02) (94% - 97%)    Parameters below as of 07 Jan 2024 05:02  Patient On (Oxygen Delivery Method): room air      General: Pt Alert and oriented, NAD  blister on medial aspect of R groin, rest of skin inspected after taking down KI and dsg, no other blisters appreciated   R leg dsg c/d/i, in KI   Pulses: 2+ DP  Sensation: SILT grossly SPN/DPN/Saph/Lizeth/Tib  Motor: 5/5 TA/GS/EHL,    A/P: 78yFemale s/p R femur retrograde nail by Dr. LINDA Jeffrey on 01-01  - Stable  - holding home spironolactone periop   - bacitracin given for medial groin, continue observation of skin   - Monitor Hgb, transfuse as needed however pt refusing  - Pain Control  - DVT ppx: ASA   - WBS: WBAT  - PT eval - [facility]     Ortho Pager [phone]

## 2024-01-07 NOTE — PROGRESS NOTE ADULT - ASSESSMENT
78y Female w/ PMHx HLD, and LE edema (on spironolactone), PSH L TKA 2016 w/ Dr. Posadas, L fore foot reconstruction Dr. Boswell 2021, presents after fall, found to have right distal femur fracture, admitted to orthopedic surgery s/p ORIF R distal femur.    # Right Distal Femur Fracture   - S/p ORIF on 1/1/24   - pain control , DVT prophylaxis , Weightbearing status , Dressing changes and drain care per ortho team      # Acute Blood loss Anemia Complicating Iron Deficiency Anemia   - Hgb 7.1 today, patient appears asymptomatic and continues to decline transfusion   - venofer 300mg x 3 days, completed 1/6/24    # HLD   - Atorvastatin     # LE Edema   - Spironolactone     # DVT prophylaxis per Ortho       DISPO : pending NAOMI   78y Female w/ PMHx HLD, and LE edema (on spironolactone), PSH L TKA 2016 w/ Dr. Posadas, L fore foot reconstruction Dr. Boswell 2021, presents after fall, found to have right distal femur fracture, admitted to orthopedic surgery s/p ORIF R distal femur.    # Right Distal Femur Fracture   - S/p ORIF on 1/1/24   - pain control , DVT prophylaxis , Weightbearing status , Dressing changes and drain care per ortho team      # Acute Blood loss Anemia Complicating Iron Deficiency Anemia   - Hgb 7.1 today, patient appears asymptomatic  - venofer 300mg x 3 days, completed 1/6/24  - would minimize blood draws to avoid iatrogenic contribution to anemia     # HLD   - Atorvastatin     # LE Edema   - Spironolactone     # DVT prophylaxis per Ortho       DISPO : pending NAOMI

## 2024-01-07 NOTE — PROGRESS NOTE ADULT - SUBJECTIVE AND OBJECTIVE BOX
Patient is a 78y old  Female who presents with a chief complaint of R distal femur fx (02 Jan 2024 15:07)        SUBJECTIVE:  Patient was seen and examined at bedside.  No complaints  Continues to express lack of interest in PRBC transfusion       Diet, Regular (01-01-24 @ 08:19) [Active]      MEDICATIONS:  MEDICATIONS  (STANDING):  acetaminophen     Tablet .. 1000 milliGRAM(s) Oral every 8 hours  aspirin  chewable 81 milliGRAM(s) Oral two times a day  atorvastatin 10 milliGRAM(s) Oral at bedtime  bacitracin   Ointment 1 Application(s) Topical two times a day  famotidine    Tablet 20 milliGRAM(s) Oral two times a day  iron sucrose IVPB 300 milliGRAM(s) IV Intermittent every 24 hours  lactated ringers. 1000 milliLiter(s) (100 mL/Hr) IV Continuous <Continuous>  multivitamin 1 Tablet(s) Oral daily  polyethylene glycol 3350 17 Gram(s) Oral at bedtime  povidone iodine 5% Nasal Swab 1 Application(s) Both Nostrils once  senna 2 Tablet(s) Oral at bedtime  sodium chloride 0.9%. 1000 milliLiter(s) (125 mL/Hr) IV Continuous <Continuous>  spironolactone 25 milliGRAM(s) Oral two times a day    MEDICATIONS  (PRN):  calcium carbonate    500 mG (Tums) Chewable 1 Tablet(s) Chew every 6 hours PRN Indigestion  HYDROmorphone  Injectable 0.5 milliGRAM(s) IV Push every 15 minutes PRN breakthrough  HYDROmorphone  Injectable 0.5 milliGRAM(s) IV Push every 6 hours PRN breakthrough  magnesium hydroxide Suspension 30 milliLiter(s) Oral daily PRN Constipation  magnesium hydroxide Suspension 30 milliLiter(s) Oral daily PRN Constipation  melatonin 5 milliGRAM(s) Oral at bedtime PRN Sleep  ondansetron Injectable 4 milliGRAM(s) IV Push every 6 hours PRN Nausea and/or Vomiting  oxyCODONE    IR 10 milliGRAM(s) Oral every 4 hours PRN Severe Pain (7 - 10)  oxyCODONE    IR 5 milliGRAM(s) Oral every 4 hours PRN Moderate Pain (4 - 6)        Allergies    No Known Allergies    Intolerances        OBJECTIVE:  Vital Signs Last 24 Hrs  T(C): 36.9 (05 Jan 2024 08:56), Max: 36.9 (05 Jan 2024 08:56)  T(F): 98.4 (05 Jan 2024 08:56), Max: 98.4 (05 Jan 2024 08:56)  HR: 85 (05 Jan 2024 08:56) (85 - 97)  BP: 119/73 (05 Jan 2024 08:56) (106/57 - 144/82)  BP(mean): --  RR: 16 (05 Jan 2024 08:56) (16 - 17)  SpO2: 97% (05 Jan 2024 08:56) (95% - 97%)    Parameters below as of 05 Jan 2024 08:56  Patient On (Oxygen Delivery Method): room air                PHYSICAL EXAM:  Gen: Resting in bed at time of exam, not in distress   HEENT: moist mucosa, no lesions   Neck: supple, trachea at midline  CV: RRR, +S1/S2  Pulm: no wheezing , no crackles  no increase in work of breathing  Abd: soft, NTND  Ext:  Right leg and knee pain   Neuro: AOx3, no gross focal neurological deficits  Psych: affect and behavior appropriate, pleasant at time of interview    LABS:                                                         6.8    6.82  )-----------( 225      ( 05 Jan 2024 07:26 )             21.4     01-05    135  |  102  |  12  ----------------------------<  134<H>  3.8   |  26  |  0.71    Ca    8.5      05 Jan 2024 07:26                  MICRODATA:    Urinalysis with Rflx Culture (collected 01 Jan 2024 01:41)        RADIOLOGY/OTHER STUDIES:             Patient is a 78y old  Female who presents with a chief complaint of R distal femur fx (02 Jan 2024 15:07)        SUBJECTIVE:  Patient was seen and examined at bedside.  Complaining of irritation to skin by cast - nurse in process of adding extra cushioning  Reports feeling somewhat lonely and bored today, says her  came yesterday but because he uses a cane he cannot come every day       Diet, Regular (01-01-24 @ 08:19) [Active]      MEDICATIONS:  MEDICATIONS  (STANDING):  acetaminophen     Tablet .. 1000 milliGRAM(s) Oral every 8 hours  aspirin  chewable 81 milliGRAM(s) Oral two times a day  atorvastatin 10 milliGRAM(s) Oral at bedtime  bacitracin   Ointment 1 Application(s) Topical two times a day  famotidine    Tablet 20 milliGRAM(s) Oral two times a day  iron sucrose IVPB 300 milliGRAM(s) IV Intermittent every 24 hours  lactated ringers. 1000 milliLiter(s) (100 mL/Hr) IV Continuous <Continuous>  multivitamin 1 Tablet(s) Oral daily  polyethylene glycol 3350 17 Gram(s) Oral at bedtime  povidone iodine 5% Nasal Swab 1 Application(s) Both Nostrils once  senna 2 Tablet(s) Oral at bedtime  sodium chloride 0.9%. 1000 milliLiter(s) (125 mL/Hr) IV Continuous <Continuous>  spironolactone 25 milliGRAM(s) Oral two times a day    MEDICATIONS  (PRN):  calcium carbonate    500 mG (Tums) Chewable 1 Tablet(s) Chew every 6 hours PRN Indigestion  HYDROmorphone  Injectable 0.5 milliGRAM(s) IV Push every 15 minutes PRN breakthrough  HYDROmorphone  Injectable 0.5 milliGRAM(s) IV Push every 6 hours PRN breakthrough  magnesium hydroxide Suspension 30 milliLiter(s) Oral daily PRN Constipation  magnesium hydroxide Suspension 30 milliLiter(s) Oral daily PRN Constipation  melatonin 5 milliGRAM(s) Oral at bedtime PRN Sleep  ondansetron Injectable 4 milliGRAM(s) IV Push every 6 hours PRN Nausea and/or Vomiting  oxyCODONE    IR 10 milliGRAM(s) Oral every 4 hours PRN Severe Pain (7 - 10)  oxyCODONE    IR 5 milliGRAM(s) Oral every 4 hours PRN Moderate Pain (4 - 6)        Allergies    No Known Allergies    Intolerances        OBJECTIVE:  Vital Signs Last 24 Hrs  T(C): 36.9 (05 Jan 2024 08:56), Max: 36.9 (05 Jan 2024 08:56)  T(F): 98.4 (05 Jan 2024 08:56), Max: 98.4 (05 Jan 2024 08:56)  HR: 85 (05 Jan 2024 08:56) (85 - 97)  BP: 119/73 (05 Jan 2024 08:56) (106/57 - 144/82)  BP(mean): --  RR: 16 (05 Jan 2024 08:56) (16 - 17)  SpO2: 97% (05 Jan 2024 08:56) (95% - 97%)    Parameters below as of 05 Jan 2024 08:56  Patient On (Oxygen Delivery Method): room air                PHYSICAL EXAM:  Gen: Resting in bed at time of exam, not in distress   HEENT: moist mucosa, no lesions   Neck: supple, trachea at midline  CV: RRR, +S1/S2  Pulm: no wheezing , no crackles  no increase in work of breathing  Abd: soft, NTND  Ext:  Right leg in soft cast   Neuro: AOx3, no gross focal neurological deficits  Psych: affect and behavior appropriate, pleasant at time of interview    LABS:                                                         6.8    6.82  )-----------( 225      ( 05 Jan 2024 07:26 )             21.4     01-05    135  |  102  |  12  ----------------------------<  134<H>  3.8   |  26  |  0.71    Ca    8.5      05 Jan 2024 07:26                  MICRODATA:    Urinalysis with Rflx Culture (collected 01 Jan 2024 01:41)        RADIOLOGY/OTHER STUDIES:

## 2024-01-08 ENCOUNTER — TRANSCRIPTION ENCOUNTER (OUTPATIENT)
Age: 79
End: 2024-01-08

## 2024-01-08 VITALS
SYSTOLIC BLOOD PRESSURE: 100 MMHG | OXYGEN SATURATION: 95 % | RESPIRATION RATE: 17 BRPM | HEART RATE: 86 BPM | DIASTOLIC BLOOD PRESSURE: 63 MMHG | TEMPERATURE: 98 F

## 2024-01-08 LAB
ANION GAP SERPL CALC-SCNC: 9 MMOL/L — SIGNIFICANT CHANGE UP (ref 5–17)
ANION GAP SERPL CALC-SCNC: 9 MMOL/L — SIGNIFICANT CHANGE UP (ref 5–17)
BUN SERPL-MCNC: 18 MG/DL — SIGNIFICANT CHANGE UP (ref 7–23)
BUN SERPL-MCNC: 18 MG/DL — SIGNIFICANT CHANGE UP (ref 7–23)
CALCIUM SERPL-MCNC: 9 MG/DL — SIGNIFICANT CHANGE UP (ref 8.4–10.5)
CALCIUM SERPL-MCNC: 9 MG/DL — SIGNIFICANT CHANGE UP (ref 8.4–10.5)
CHLORIDE SERPL-SCNC: 98 MMOL/L — SIGNIFICANT CHANGE UP (ref 96–108)
CHLORIDE SERPL-SCNC: 98 MMOL/L — SIGNIFICANT CHANGE UP (ref 96–108)
CO2 SERPL-SCNC: 26 MMOL/L — SIGNIFICANT CHANGE UP (ref 22–31)
CO2 SERPL-SCNC: 26 MMOL/L — SIGNIFICANT CHANGE UP (ref 22–31)
CREAT SERPL-MCNC: 0.77 MG/DL — SIGNIFICANT CHANGE UP (ref 0.5–1.3)
CREAT SERPL-MCNC: 0.77 MG/DL — SIGNIFICANT CHANGE UP (ref 0.5–1.3)
EGFR: 78 ML/MIN/1.73M2 — SIGNIFICANT CHANGE UP
EGFR: 78 ML/MIN/1.73M2 — SIGNIFICANT CHANGE UP
GLUCOSE SERPL-MCNC: 96 MG/DL — SIGNIFICANT CHANGE UP (ref 70–99)
GLUCOSE SERPL-MCNC: 96 MG/DL — SIGNIFICANT CHANGE UP (ref 70–99)
HCT VFR BLD CALC: 25.7 % — LOW (ref 34.5–45)
HCT VFR BLD CALC: 25.7 % — LOW (ref 34.5–45)
HGB BLD-MCNC: 8.3 G/DL — LOW (ref 11.5–15.5)
HGB BLD-MCNC: 8.3 G/DL — LOW (ref 11.5–15.5)
MCHC RBC-ENTMCNC: 29.4 PG — SIGNIFICANT CHANGE UP (ref 27–34)
MCHC RBC-ENTMCNC: 29.4 PG — SIGNIFICANT CHANGE UP (ref 27–34)
MCHC RBC-ENTMCNC: 32.3 GM/DL — SIGNIFICANT CHANGE UP (ref 32–36)
MCHC RBC-ENTMCNC: 32.3 GM/DL — SIGNIFICANT CHANGE UP (ref 32–36)
MCV RBC AUTO: 91.1 FL — SIGNIFICANT CHANGE UP (ref 80–100)
MCV RBC AUTO: 91.1 FL — SIGNIFICANT CHANGE UP (ref 80–100)
NRBC # BLD: 0 /100 WBCS — SIGNIFICANT CHANGE UP (ref 0–0)
NRBC # BLD: 0 /100 WBCS — SIGNIFICANT CHANGE UP (ref 0–0)
PLATELET # BLD AUTO: 371 K/UL — SIGNIFICANT CHANGE UP (ref 150–400)
PLATELET # BLD AUTO: 371 K/UL — SIGNIFICANT CHANGE UP (ref 150–400)
POTASSIUM SERPL-MCNC: 3.8 MMOL/L — SIGNIFICANT CHANGE UP (ref 3.5–5.3)
POTASSIUM SERPL-MCNC: 3.8 MMOL/L — SIGNIFICANT CHANGE UP (ref 3.5–5.3)
POTASSIUM SERPL-SCNC: 3.8 MMOL/L — SIGNIFICANT CHANGE UP (ref 3.5–5.3)
POTASSIUM SERPL-SCNC: 3.8 MMOL/L — SIGNIFICANT CHANGE UP (ref 3.5–5.3)
RBC # BLD: 2.82 M/UL — LOW (ref 3.8–5.2)
RBC # BLD: 2.82 M/UL — LOW (ref 3.8–5.2)
RBC # FLD: 15.1 % — HIGH (ref 10.3–14.5)
RBC # FLD: 15.1 % — HIGH (ref 10.3–14.5)
SODIUM SERPL-SCNC: 133 MMOL/L — LOW (ref 135–145)
SODIUM SERPL-SCNC: 133 MMOL/L — LOW (ref 135–145)
WBC # BLD: 8.85 K/UL — SIGNIFICANT CHANGE UP (ref 3.8–10.5)
WBC # BLD: 8.85 K/UL — SIGNIFICANT CHANGE UP (ref 3.8–10.5)
WBC # FLD AUTO: 8.85 K/UL — SIGNIFICANT CHANGE UP (ref 3.8–10.5)
WBC # FLD AUTO: 8.85 K/UL — SIGNIFICANT CHANGE UP (ref 3.8–10.5)

## 2024-01-08 PROCEDURE — 93005 ELECTROCARDIOGRAM TRACING: CPT

## 2024-01-08 PROCEDURE — 73560 X-RAY EXAM OF KNEE 1 OR 2: CPT

## 2024-01-08 PROCEDURE — 70450 CT HEAD/BRAIN W/O DYE: CPT | Mod: MA

## 2024-01-08 PROCEDURE — 71045 X-RAY EXAM CHEST 1 VIEW: CPT

## 2024-01-08 PROCEDURE — C1713: CPT

## 2024-01-08 PROCEDURE — 83550 IRON BINDING TEST: CPT

## 2024-01-08 PROCEDURE — 86923 COMPATIBILITY TEST ELECTRIC: CPT

## 2024-01-08 PROCEDURE — 96374 THER/PROPH/DIAG INJ IV PUSH: CPT

## 2024-01-08 PROCEDURE — 99285 EMERGENCY DEPT VISIT HI MDM: CPT | Mod: 25

## 2024-01-08 PROCEDURE — 86850 RBC ANTIBODY SCREEN: CPT

## 2024-01-08 PROCEDURE — 73501 X-RAY EXAM HIP UNI 1 VIEW: CPT

## 2024-01-08 PROCEDURE — 83540 ASSAY OF IRON: CPT

## 2024-01-08 PROCEDURE — 86901 BLOOD TYPING SEROLOGIC RH(D): CPT

## 2024-01-08 PROCEDURE — 73552 X-RAY EXAM OF FEMUR 2/>: CPT | Mod: 26,RT

## 2024-01-08 PROCEDURE — 73700 CT LOWER EXTREMITY W/O DYE: CPT | Mod: MA

## 2024-01-08 PROCEDURE — 73551 X-RAY EXAM OF FEMUR 1: CPT

## 2024-01-08 PROCEDURE — 81001 URINALYSIS AUTO W/SCOPE: CPT

## 2024-01-08 PROCEDURE — 80048 BASIC METABOLIC PNL TOTAL CA: CPT

## 2024-01-08 PROCEDURE — 97530 THERAPEUTIC ACTIVITIES: CPT

## 2024-01-08 PROCEDURE — 73030 X-RAY EXAM OF SHOULDER: CPT

## 2024-01-08 PROCEDURE — 99232 SBSQ HOSP IP/OBS MODERATE 35: CPT

## 2024-01-08 PROCEDURE — 83036 HEMOGLOBIN GLYCOSYLATED A1C: CPT

## 2024-01-08 PROCEDURE — 97161 PT EVAL LOW COMPLEX 20 MIN: CPT

## 2024-01-08 PROCEDURE — 97165 OT EVAL LOW COMPLEX 30 MIN: CPT

## 2024-01-08 PROCEDURE — 85025 COMPLETE CBC W/AUTO DIFF WBC: CPT

## 2024-01-08 PROCEDURE — 84466 ASSAY OF TRANSFERRIN: CPT

## 2024-01-08 PROCEDURE — 76000 FLUOROSCOPY <1 HR PHYS/QHP: CPT

## 2024-01-08 PROCEDURE — 36415 COLL VENOUS BLD VENIPUNCTURE: CPT

## 2024-01-08 PROCEDURE — 82728 ASSAY OF FERRITIN: CPT

## 2024-01-08 PROCEDURE — 85610 PROTHROMBIN TIME: CPT

## 2024-01-08 PROCEDURE — 85027 COMPLETE CBC AUTOMATED: CPT

## 2024-01-08 PROCEDURE — C1769: CPT

## 2024-01-08 PROCEDURE — 85730 THROMBOPLASTIN TIME PARTIAL: CPT

## 2024-01-08 PROCEDURE — 73552 X-RAY EXAM OF FEMUR 2/>: CPT

## 2024-01-08 PROCEDURE — 86900 BLOOD TYPING SEROLOGIC ABO: CPT

## 2024-01-08 RX ORDER — BACITRACIN ZINC 500 UNIT/G
1 OINTMENT IN PACKET (EA) TOPICAL
Qty: 0 | Refills: 0 | DISCHARGE
Start: 2024-01-08

## 2024-01-08 RX ORDER — BACITRACIN ZINC 500 UNIT/G
1 OINTMENT IN PACKET (EA) TOPICAL THREE TIMES A DAY
Refills: 0 | Status: DISCONTINUED | OUTPATIENT
Start: 2024-01-08 | End: 2024-01-08

## 2024-01-08 RX ORDER — HYDROMORPHONE HYDROCHLORIDE 2 MG/ML
0.5 INJECTION INTRAMUSCULAR; INTRAVENOUS; SUBCUTANEOUS EVERY 6 HOURS
Refills: 0 | Status: DISCONTINUED | OUTPATIENT
Start: 2024-01-08 | End: 2024-01-08

## 2024-01-08 RX ADMIN — MAGNESIUM HYDROXIDE 30 MILLILITER(S): 400 TABLET, CHEWABLE ORAL at 15:35

## 2024-01-08 RX ADMIN — HYDROMORPHONE HYDROCHLORIDE 4 MILLIGRAM(S): 2 INJECTION INTRAMUSCULAR; INTRAVENOUS; SUBCUTANEOUS at 17:55

## 2024-01-08 RX ADMIN — Medication 81 MILLIGRAM(S): at 06:20

## 2024-01-08 RX ADMIN — Medication 1 APPLICATION(S): at 15:35

## 2024-01-08 RX ADMIN — HYDROMORPHONE HYDROCHLORIDE 4 MILLIGRAM(S): 2 INJECTION INTRAMUSCULAR; INTRAVENOUS; SUBCUTANEOUS at 16:55

## 2024-01-08 RX ADMIN — Medication 1 TABLET(S): at 11:12

## 2024-01-08 RX ADMIN — HYDROMORPHONE HYDROCHLORIDE 4 MILLIGRAM(S): 2 INJECTION INTRAMUSCULAR; INTRAVENOUS; SUBCUTANEOUS at 12:11

## 2024-01-08 RX ADMIN — Medication 81 MILLIGRAM(S): at 16:55

## 2024-01-08 RX ADMIN — FAMOTIDINE 20 MILLIGRAM(S): 10 INJECTION INTRAVENOUS at 06:20

## 2024-01-08 RX ADMIN — Medication 30 MILLILITER(S): at 11:13

## 2024-01-08 RX ADMIN — Medication 5 MILLIGRAM(S): at 16:56

## 2024-01-08 RX ADMIN — FAMOTIDINE 20 MILLIGRAM(S): 10 INJECTION INTRAVENOUS at 16:55

## 2024-01-08 RX ADMIN — HYDROMORPHONE HYDROCHLORIDE 4 MILLIGRAM(S): 2 INJECTION INTRAMUSCULAR; INTRAVENOUS; SUBCUTANEOUS at 11:11

## 2024-01-08 RX ADMIN — Medication 1 APPLICATION(S): at 06:20

## 2024-01-08 NOTE — DISCHARGE NOTE NURSING/CASE MANAGEMENT/SOCIAL WORK - PATIENT PORTAL LINK FT
You can access the FollowMyHealth Patient Portal offered by [facility] by registering at the following website: [email or web address]. By joining [employer]’s FollowMyHealth portal, you will also be able to view your health information using other applications (apps) compatible with our system. You can access the FollowMyHealth Patient Portal offered by [facility] by registering at the following website: [email or web address]. By joining [employer]’s FollowMyHealth portal, you will also be able to view your health information using other applications (apps) compatible with our system.

## 2024-01-08 NOTE — PROGRESS NOTE ADULT - PROVIDER SPECIALTY LIST ADULT
Internal Medicine
Orthopedics
Hospitalist
Internal Medicine
Orthopedics
Hospitalist
Internal Medicine
Hospitalist
Internal Medicine

## 2024-01-08 NOTE — PROGRESS NOTE ADULT - SUBJECTIVE AND OBJECTIVE BOX
Patient is a 78y old  Female who presents with a chief complaint of R distal femur fx (02 Jan 2024 15:07)        SUBJECTIVE:  Patient was seen and examined at bedside.  feeling better today   no new complaints       Diet, Regular (01-01-24 @ 08:19) [Active]      MEDICATIONS:  MEDICATIONS  (STANDING):  acetaminophen     Tablet .. 1000 milliGRAM(s) Oral every 8 hours  aspirin  chewable 81 milliGRAM(s) Oral two times a day  atorvastatin 10 milliGRAM(s) Oral at bedtime  bacitracin   Ointment 1 Application(s) Topical two times a day  bisacodyl 5 milliGRAM(s) Oral every 12 hours  famotidine    Tablet 20 milliGRAM(s) Oral two times a day  mineral oil 30 milliLiter(s) Oral daily  multivitamin 1 Tablet(s) Oral daily  polyethylene glycol 3350 17 Gram(s) Oral at bedtime  povidone iodine 5% Nasal Swab 1 Application(s) Both Nostrils once  senna 2 Tablet(s) Oral at bedtime  spironolactone 25 milliGRAM(s) Oral two times a day    MEDICATIONS  (PRN):  calcium carbonate    500 mG (Tums) Chewable 1 Tablet(s) Chew every 6 hours PRN Indigestion  HYDROmorphone   Tablet 2 milliGRAM(s) Oral every 4 hours PRN Moderate Pain (4 - 6)  HYDROmorphone   Tablet 4 milliGRAM(s) Oral every 4 hours PRN Severe Pain (7 - 10)  HYDROmorphone  Injectable 0.5 milliGRAM(s) IV Push every 15 minutes PRN breakthrough  HYDROmorphone  Injectable 0.5 milliGRAM(s) IV Push every 6 hours PRN breakthrough  magnesium hydroxide Suspension 30 milliLiter(s) Oral daily PRN Constipation  melatonin 5 milliGRAM(s) Oral at bedtime PRN Sleep  ondansetron Injectable 4 milliGRAM(s) IV Push every 6 hours PRN Nausea and/or Vomiting        Allergies    No Known Allergies    Intolerances        OBJECTIVE:  Vital Signs Last 24 Hrs  T(C): 36.4 (08 Jan 2024 08:45), Max: 36.6 (07 Jan 2024 16:42)  T(F): 97.6 (08 Jan 2024 08:45), Max: 97.9 (07 Jan 2024 16:42)  HR: 86 (08 Jan 2024 08:45) (85 - 97)  BP: 100/63 (08 Jan 2024 08:45) (100/63 - 122/66)  BP(mean): 75 (08 Jan 2024 08:45) (75 - 85)  RR: 17 (08 Jan 2024 08:45) (16 - 17)  SpO2: 95% (08 Jan 2024 08:45) (95% - 99%)    Parameters below as of 08 Jan 2024 08:45  Patient On (Oxygen Delivery Method): room air                    PHYSICAL EXAM:  Gen: Resting in bed at time of exam, not in distress   HEENT: moist mucosa, no lesions   Neck: supple, trachea at midline  CV: RRR, +S1/S2  Pulm: no wheezing , no crackles  no increase in work of breathing  Abd: soft, NTND  Ext:  Right leg in soft cast , left forearm swollen - ? thrombophlebitis   Neuro: AOx3, no gross focal neurological deficits  Psych: affect and behavior appropriate, pleasant at time of interview    LABS:                                                                    8.3    8.85  )-----------( 371      ( 08 Jan 2024 07:55 )             25.7     01-08    133<L>  |  98  |  18  ----------------------------<  96  3.8   |  26  |  0.77    Ca    9.0      08 Jan 2024 07:55                    MICRODATA:    Urinalysis with Rflx Culture (collected 01 Jan 2024 01:41)        RADIOLOGY/OTHER STUDIES:

## 2024-01-08 NOTE — DISCHARGE NOTE NURSING/CASE MANAGEMENT/SOCIAL WORK - NSDCPEFALRISK_GEN_ALL_CORE
For information on Fall & Injury Prevention, visit: [email or web address] OR  [email or web address] OR  https://www.cdc.gov/steadi/patient.html For information on Fall & Injury Prevention, visit: [email or web address] OR  [email or web address] OR  https://www.cdc.gov/steadi/patient.html

## 2024-01-08 NOTE — PROGRESS NOTE ADULT - SUBJECTIVE AND OBJECTIVE BOX
Ortho Progress Note    Procedure:  R femur retrograde nail   Surgeon: Dr. LINDA Jeffrey    No complaints. did not feel that she was ready for jazzy yday   Denies CP, SOB, N/V, numbness/tingling     Vital Signs Last 24 Hrs  T(C): 37 (07 Jan 2024 05:02), Max: 37 (07 Jan 2024 05:02)  T(F): 98.6 (07 Jan 2024 05:02), Max: 98.6 (07 Jan 2024 05:02)  HR: 97 (07 Jan 2024 05:02) (84 - 97)  BP: 134/75 (07 Jan 2024 05:02) (108/67 - 134/75)  BP(mean): --  RR: 16 (07 Jan 2024 05:02) (16 - 17)  SpO2: 96% (07 Jan 2024 05:02) (94% - 97%)    Parameters below as of 07 Jan 2024 05:02  Patient On (Oxygen Delivery Method): room air      General: Pt Alert and oriented, NAD  blister on medial aspect of R groin, rest of skin inspected after taking down KI and dsg, no other blisters appreciated   R leg dsg c/d/i, in KI   Pulses: 2+ DP  Sensation: SILT grossly SPN/DPN/Saph/Lizeth/Tib  Motor: 5/5 TA/GS/EHL,    A/P: 78yFemale s/p R femur retrograde nail by Dr. LINDA Jeffrey on 01-01  - Stable  - holding home spironolactone periop   - bacitracin given for medial groin, continue observation of skin   - Monitor Hgb, transfuse as needed however pt refusing  - Pain Control  - DVT ppx: ASA   - WBS: WBAT  - PT eval - [facility]     Ortho Pager [phone] Ortho Progress Note    Procedure:  R femur retrograde nail   Surgeon: Dr. LINDA Jeffrey    No complaints. did not feel that she was ready for jazzy yday   Denies CP, SOB, N/V, numbness/tingling     Vital Signs Last 24 Hrs  T(C): 37 (07 Jan 2024 05:02), Max: 37 (07 Jan 2024 05:02)  T(F): 98.6 (07 Jan 2024 05:02), Max: 98.6 (07 Jan 2024 05:02)  HR: 97 (07 Jan 2024 05:02) (84 - 97)  BP: 134/75 (07 Jan 2024 05:02) (108/67 - 134/75)  BP(mean): --  RR: 16 (07 Jan 2024 05:02) (16 - 17)  SpO2: 96% (07 Jan 2024 05:02) (94% - 97%)    Parameters below as of 07 Jan 2024 05:02  Patient On (Oxygen Delivery Method): room air      General: Pt Alert and oriented, NAD  blister on medial aspect of R groin, rest of skin inspected after taking down KI and dsg, no other blisters appreciated   R leg dsg c/d/i, in KI   Pulses: 2+ DP  Sensation: SILT grossly SPN/DPN/Saph/Lizeth/Tib  Motor: 5/5 TA/GS/EHL,    A/P: 78yFemale s/p R femur retrograde nail by Dr. LINDA Jeffrey on 01-01  - Stable  - holding home spironolactone periop   - bacitracin given for medial groin, continue observation of skin   - Monitor Hgb, transfuse as needed however pt refusing  - Pain Control  - DVT ppx: ASA   - WBS: WBAT  - PT eval - [facility]     Ortho Pager [phone]

## 2024-01-08 NOTE — PROGRESS NOTE ADULT - SUBJECTIVE AND OBJECTIVE BOX
Ortho Note    Surgery: s/p Right femur retrograde nail POD #7  Patient seen and examined at bedside this AM  Pt endorsing right thigh pain and swelling   Denies CP, SOB, N/V, numbness/tingling     Vital Signs Last 24 Hrs  T(C): 36.4 (01-08-24 @ 08:45), Max: 36.4 (01-08-24 @ 08:45)  T(F): 97.6 (01-08-24 @ 08:45), Max: 97.6 (01-08-24 @ 08:45)  HR: 86 (01-08-24 @ 08:45) (86 - 86)  BP: 100/63 (01-08-24 @ 08:45) (100/63 - 100/63)  BP(mean): 75 (01-08-24 @ 08:45) (75 - 75)  RR: 17 (01-08-24 @ 08:45) (17 - 17)  SpO2: 95% (01-08-24 @ 08:45) (95% - 95%)  AVSS    General: Pt Alert and oriented, NAD  Dressing C/D/I: aquacel dressing changed, gauze/tegaderm applied   extremity warm and well perfused   Sensation: intact to light touch bilateral LE   Motor: EHL/FHL/TA/GS 5/5 bilateral LE         A/P: 78yFemale POD# 7 s/p Right femur retrograde IMN     - Labs and vitals stable  - Pain Control: c/w PO PRN   - DVT ppx: c/w Aspirin 81mg BID   - PT, WBS: WBAT in KI x2 weeks post op   - discharge to rehab today     Ortho Pager [phone] Ortho Note    Surgery: s/p Right femur retrograde nail POD #7  Patient seen and examined at bedside this AM  Pt endorsing right thigh pain and swelling   Denies CP, SOB, N/V, numbness/tingling     Vital Signs Last 24 Hrs  T(C): 36.4 (01-08-24 @ 08:45), Max: 36.4 (01-08-24 @ 08:45)  T(F): 97.6 (01-08-24 @ 08:45), Max: 97.6 (01-08-24 @ 08:45)  HR: 86 (01-08-24 @ 08:45) (86 - 86)  BP: 100/63 (01-08-24 @ 08:45) (100/63 - 100/63)  BP(mean): 75 (01-08-24 @ 08:45) (75 - 75)  RR: 17 (01-08-24 @ 08:45) (17 - 17)  SpO2: 95% (01-08-24 @ 08:45) (95% - 95%)  AVSS    General: Pt Alert and oriented, NAD  Dressing C/D/I: aquacel dressing changed, gauze/tegaderm applied   extremity warm and well perfused   Sensation: intact to light touch bilateral LE   Motor: EHL/FHL/TA/GS 5/5 bilateral LE         A/P: 78yFemale POD# 7 s/p Right femur retrograde IMN     - Labs and vitals stable  - Pain Control: c/w PO PRN   - DVT ppx: c/w Aspirin 81mg BID   - PT, WBS: WBAT in KI x2 weeks post op   - discharge to rehab today     Ortho Pager [phone]

## 2024-01-08 NOTE — PROGRESS NOTE ADULT - REASON FOR ADMISSION
R distal femur fx
Right distal femur fx
R distal femur fx

## 2024-01-08 NOTE — PROGRESS NOTE ADULT - ASSESSMENT
78y Female w/ PMHx HLD, and LE edema (on spironolactone), PSH L TKA 2016 w/ Dr. Posadas, L fore foot reconstruction Dr. Boswell 2021, presents after fall, found to have right distal femur fracture, admitted to orthopedic surgery s/p ORIF R distal femur.    # Right Distal Femur Fracture   - S/p ORIF on 1/1/24   - pain control , DVT prophylaxis , Weightbearing status , Dressing changes and drain care per ortho team      # Acute Blood loss Anemia Complicating Iron Deficiency Anemia   - Hgb 8.3 today, patient appears asymptomatic  - Venofer 300mg x 3 days, completed 1/6/24  - would minimize blood draws to avoid iatrogenic contribution to anemia     # HLD   - Atorvastatin     # LE Edema   - Spironolactone     # Hyponatremia , asymptomatic , recommend recheck in 2 days     # DVT prophylaxis per Ortho     # Left Fore Arm Thrombophlebitis , warm compress and Bacitracin ointment       DISPO : pending NAOMI

## 2024-01-17 DIAGNOSIS — I80.8 PHLEBITIS AND THROMBOPHLEBITIS OF OTHER SITES: ICD-10-CM

## 2024-01-17 DIAGNOSIS — S72.301A UNSPECIFIED FRACTURE OF SHAFT OF RIGHT FEMUR, INITIAL ENCOUNTER FOR CLOSED FRACTURE: ICD-10-CM

## 2024-01-17 DIAGNOSIS — Z79.82 LONG TERM (CURRENT) USE OF ASPIRIN: ICD-10-CM

## 2024-01-17 DIAGNOSIS — D62 ACUTE POSTHEMORRHAGIC ANEMIA: ICD-10-CM

## 2024-01-17 DIAGNOSIS — R60.0 LOCALIZED EDEMA: ICD-10-CM

## 2024-01-17 DIAGNOSIS — S72.351A DISPLACED COMMINUTED FRACTURE OF SHAFT OF RIGHT FEMUR, INITIAL ENCOUNTER FOR CLOSED FRACTURE: ICD-10-CM

## 2024-01-17 DIAGNOSIS — Z96.652 PRESENCE OF LEFT ARTIFICIAL KNEE JOINT: ICD-10-CM

## 2024-01-17 DIAGNOSIS — I10 ESSENTIAL (PRIMARY) HYPERTENSION: ICD-10-CM
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
## 2024-01-17 DIAGNOSIS — I73.9 PERIPHERAL VASCULAR DISEASE, UNSPECIFIED: ICD-10-CM

## 2024-01-17 DIAGNOSIS — E78.5 HYPERLIPIDEMIA, UNSPECIFIED: ICD-10-CM
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
## 2024-01-17 DIAGNOSIS — E87.1 HYPO-OSMOLALITY AND HYPONATREMIA: ICD-10-CM

## 2024-01-17 DIAGNOSIS — Z53.20 PROCEDURE AND TREATMENT NOT CARRIED OUT BECAUSE OF PATIENT'S DECISION FOR UNSPECIFIED REASONS: ICD-10-CM

## 2024-02-22 ENCOUNTER — APPOINTMENT (OUTPATIENT)
Dept: RADIOLOGY | Facility: CLINIC | Age: 79
End: 2024-02-22

## 2024-02-22 ENCOUNTER — APPOINTMENT (OUTPATIENT)
Dept: MAMMOGRAPHY | Facility: CLINIC | Age: 79
End: 2024-02-22

## 2024-07-09 ENCOUNTER — APPOINTMENT (OUTPATIENT)
Dept: MAMMOGRAPHY | Facility: CLINIC | Age: 79
End: 2024-07-09
Payer: MEDICARE

## 2024-07-09 ENCOUNTER — APPOINTMENT (OUTPATIENT)
Dept: RADIOLOGY | Facility: CLINIC | Age: 79
End: 2024-07-09
Payer: MEDICARE

## 2024-07-09 PROCEDURE — 77063 BREAST TOMOSYNTHESIS BI: CPT

## 2024-07-09 PROCEDURE — 77080 DXA BONE DENSITY AXIAL: CPT

## 2024-07-09 PROCEDURE — 77067 SCR MAMMO BI INCL CAD: CPT

## 2024-07-15 ENCOUNTER — OUTPATIENT (OUTPATIENT)
Dept: OUTPATIENT SERVICES | Facility: HOSPITAL | Age: 79
LOS: 1 days | End: 2024-07-15
Payer: MEDICARE

## 2024-07-15 DIAGNOSIS — Z98.890 OTHER SPECIFIED POSTPROCEDURAL STATES: Chronic | ICD-10-CM

## 2024-07-15 DIAGNOSIS — Z41.1 ENCOUNTER FOR COSMETIC SURGERY: Chronic | ICD-10-CM

## 2024-07-15 DIAGNOSIS — Z96.659 PRESENCE OF UNSPECIFIED ARTIFICIAL KNEE JOINT: Chronic | ICD-10-CM

## 2024-07-15 DIAGNOSIS — Z98.89 OTHER SPECIFIED POSTPROCEDURAL STATES: Chronic | ICD-10-CM

## 2024-07-15 PROCEDURE — 73630 X-RAY EXAM OF FOOT: CPT

## 2024-07-15 PROCEDURE — 73630 X-RAY EXAM OF FOOT: CPT | Mod: 26,LT

## 2024-07-15 PROCEDURE — 73600 X-RAY EXAM OF ANKLE: CPT | Mod: 26,LT

## 2024-07-15 PROCEDURE — 73600 X-RAY EXAM OF ANKLE: CPT

## 2024-08-22 NOTE — ASU PATIENT PROFILE, ADULT - NS PREOP UNDERSTANDS INFO
As per MD nothing to eat or drink after midnight tonight; patient reminded to come with photo ID/insurance/credit card; dress in comfortable clothes; no jewelries/contact lens/valuable; no smoking/alcohol drinking/recreational drug use today; escort to have photo ID; address and callback number was given/yes

## 2024-08-23 ENCOUNTER — OUTPATIENT (OUTPATIENT)
Dept: OUTPATIENT SERVICES | Facility: HOSPITAL | Age: 79
LOS: 1 days | End: 2024-08-23

## 2024-08-23 DIAGNOSIS — Z41.1 ENCOUNTER FOR COSMETIC SURGERY: Chronic | ICD-10-CM

## 2024-08-23 DIAGNOSIS — Z98.89 OTHER SPECIFIED POSTPROCEDURAL STATES: Chronic | ICD-10-CM

## 2024-08-23 DIAGNOSIS — Z98.890 OTHER SPECIFIED POSTPROCEDURAL STATES: Chronic | ICD-10-CM

## 2024-08-23 DIAGNOSIS — Z96.659 PRESENCE OF UNSPECIFIED ARTIFICIAL KNEE JOINT: Chronic | ICD-10-CM

## 2024-09-05 NOTE — PACU DISCHARGE NOTE - NS MD DISCHARGE NOTE DISCHARGE
Writer contacted patient and assisted with scheduling follow up on Monday 04/25 at 1000 for elevated blood pressure readings while being seen at another providers office.     Patient has no further questions at this time.    Home

## 2024-10-20 NOTE — ED PROVIDER NOTE - WR ORDER DATE AND TIME 1
HOSPITALIST DAILY PROGRESS NOTE     Aurea Pelaez  89 year old female  [id number]    Date: 10/20/2024   Current Hospital Stay : Hospital Day: 3     Reason for admission: Disorientation, stroke workup, hallucination, UTI    Subjective:   No acute events overnight.   The patient was seen and examined at bedside.    Today, the patient was more awake today and was communicating.  She was mumbling alone when I entered the room but then she started responding to me.  She is AOx3.  She is appropriate at this time.  Vitals are stable except elevated blood pressure which is currently improving.  She has no major concerns today except mild abdominal discomfort and constipation.    Patient has no other complaints at this time     Summary of Hospitalization(admission H&P by myself):   Patient is a 89 year old female with PMHx of essential hypertension, pulmonary dysfunction chronically using walker, type 2 diabetes mellitus, dyslipidemia, GERD, depression, stage IIIa CKD, hypothyroidism, history of recurrent UTI who presented today with dizziness.  Patient lives in assisted living facility and she is presented here with dizziness since this morning.  She also has double vision, unsteadiness, balance problem and saw flashes of light. She was hallucinating in the ER, was closing the right eye, and she was seeing a baby in the ER room which was not there. Per notes daughter reported patient acts strange and says odd things whenever she has UTI.  I advised the patient at bedside after she was admitted and history was very limited, she closed her eyes but opens it with verbal command but was not telling me much.  H&P added mostly from chart revision and ER signout.     Patient has previous history of recurrent UTI Proteus UTI in August 2022, Pseudomonas UTI in December 2022, and Klebsiella UTI in September 2023.     On presentation to ER, vitals signs were stable except elevated blood pressure with systolic as high as 200s,  currently improving.  Labs on presentation significant for normal CBC except hemoglobin 11.6 minimal drop from baseline.  Normal CMP except creatinine 1.45 which is around baseline.  INR normal.  Urinalysis concerning for UTI with  leukocytes, moderate bacteria and moderate leukocyte esterase but negative nitrites.  She was found to have concern for cranial nerve deficit as she was not able to move her right eye to the left. CT head without contrast with no acute intracranial abnormality with small old infarction and chronic small vessel ischemic changes.     ER attending discussed with neurology who agrees with MRI brain.  Patient admitted for workup.      Review of Systems:  As noted above  All other systems negative    Vitals :   Vitals with min/max:      Vital Last Value 24 Hour Range   Temperature 98.1 °F (36.7 °C) (10/20/24 0612) Temp  Min: 97.7 °F (36.5 °C)  Max: 98.6 °F (37 °C)   Pulse 67 (10/20/24 0908) Pulse  Min: 60  Max: 72   Respiratory 20 (10/20/24 0612) Resp  Min: 16  Max: 20   Non-Invasive  Blood Pressure (!) 162/90 (10/20/24 0908) BP  Min: 153/76  Max: 222/89   Pulse Oximetry 90 % (10/20/24 0654) SpO2  Min: 90 %  Max: 99 %   Arterial   Blood Pressure   No data recorded       Vital Most Recent Value First Value   Weight 78.6 kg (173 lb 4.5 oz) Weight: 80.8 kg (178 lb 2.1 oz)   Height 5' 2\" (157.5 cm) Height: 5' 2\" (157.5 cm)     I/O last 3 completed shifts:  In: 2266 [P.O.:390; I.V.:1776; IV Piggyback:100]  Out: 950 [Urine:950]     Physical Examination :  General: Patient is in no acute distress. Patient is comfortable and conversant  HEENT: Pupils equally round and reactive to light and accommodation. No scleral icterus. No conjunctival pallor.   CV: Regular rate and rhythm, normal S1 and S2. No murmur. No JVD. No edema  Pulm: Lungs are clear to auscultation bilaterally. No wheezes, rales or rhonchi. Air entry normal and equal bilaterally.   GI: Soft, nontender. Normal bowel sounds.    MSK/Integumentary: Bilateral upper and lower extremities with normal ROM  Neuro: Cranial nerves II-XII are intact, she was not able to move her right eye medially on admission but but currently no gross extraocular movement deficit, looks improved, though she has mild blurred vision on right side. No gross motor deficits. No sensory loss.  She was able to squeeze my fingers by both hands, strength symmetric.   Psych: Patient is alert and oriented X3.  She is appropriate.  She answered all of my questions.  She was interacting well.    Medications: Reviewed.    Laboratory data :    Recent Labs   Lab 10/20/24  0514 10/19/24  0451 10/18/24  1418   WBC 8.3 7.7 7.1   HCT 30.6* 31.5* 35.7*   HGB 10.0* 10.5* 11.6*    149 182   INR  --   --  1.0   PTT  --   --  28   SODIUM 142 140 141   POTASSIUM 3.7 4.0 4.1   CHLORIDE 106 106 105   CO2 28 21 27   CALCIUM 8.8 8.8 9.1   GLUCOSE 141* 135* 172*   BUN 26* 33* 43*   CREATININE 1.39* 1.40* 1.45*   AST  --   --  19   GPT  --   --  23   ALKPT  --   --  87   BILIRUBIN  --   --  0.4   ALBUMIN  --   --  4.1     IMAGING    CTA HEAD AND NECK   Final Result   IMPRESSION:      1.  Nearly occluded right ICA at the carotid bulb with minimal threadlike   flow throughout the cervical and proximal intracranial segments. There is   more substantial opacification in the cavernous segment via [place] of   [place] and ophthalmic collaterals. This is likely chronic.   2.  Severe, 90% narrowing of the left carotid bulb. The remainder of the   artery is patent.   3.  The developmentally hypoplastic left vertebral artery is occluded at   its origin with reconstitution of flow at the distal V1 segment via a   muscular collaterals. This is also favored to be chronic.   4.  Short segment ectasia of the proximal left subclavian artery with a 6   mm outpouching which may relate to plaque ulceration or chronic   dissection/pseudoaneurysm.   5.  No acute intracranial process on the CT head portion of  the study.   Right frontal meningioma with adjacent edema is similar to the previous   study.   6.  Rounded asymmetric soft tissue in the right posterior nasopharynx may   represent a retention cyst, but is incompletely evaluated. Consider   nonemergent ENT consultation.   7.  Periapical lucency at tooth #9.      Electronically Signed by: Chelsea Del Rosario MD   Signed on: 10/19/2024 1:00 PM   Created on Workstation ID: [id number]   Signed on Workstation ID: [id number]      MRI BRAIN WO CONTRAST   Final Result   IMPRESSION:   No evidence of acute infarct.   Otherwise this exam is incomplete and degraded by motion.      Electronically Signed by: Naeem Moise MD   Signed on: 10/19/2024 12:36 PM   Created on Workstation ID: [id number]   Signed on Workstation ID: [id number]      CT HEAD WO CONTRAST   Final Result   IMPRESSION:   1.  No acute intracranial findings.   2.  Atrophy and chronic small vessel ischemic changes.   3.  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   4.  Calcified meningioma inferior to the right frontal lobe, similar to   previous. Unchanged mild adjacent gliosis versus leukoaraiosis versus   reactive vasogenic edema in the right frontal white matter.            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  She is also having double vision, closing her right eye, difficulty of right  eye to lateral movement but it is improving today.  CT head with no acute intracranial abnormality  -CTA head and neck showed nearly occluded right ICA at carotid bulb with minimal flow, severe 90% narrowing of left carotid bulb, developmentally hypoplastic left vertebral artery occluded with collaterals, proximal left subclavian artery outpouching plaque ulceration versus chronic dissection/pseudoaneurysm  -MRI brain with no acute intracranial finding but incomplete exam  -Neurology consulted, appreciate recommendations        -Discussed with neurologist, Plavix added on top of aspirin and statin, no additional intervention needed at this time for the CTA finding due to her age and chronic finding  -TTE with bubble study EF 59%, grade 1 diastolic dysfunction, no evidence of shunt, no significant change from prior study  -PT/OT and speech therapy consulted  -UDS negative, blood acetaminophen and salicylate level normal  -Telemetry monitoring  -Monitor closely     ## E. coli UTI  ## Hallucination  - Urinalysis concerning for UTI with  leukocytes, moderate bacteria and moderate leukocyte esterase but negative nitrites.  -Per daughter patient acts strange whenever she has UTI  -She was hallucinating in the ER  -History of Pseudomonas UTI in December 2022, and Klebsiella UTI in September 2023  -Urine culture growing E. coli, final culture result and sensitivities pending  -Continue cefepime for now     ## Essential hypertension, BP above goal  -Blood pressure systolic 200s on presentation, currently improving  -Restarted amlodipine, losartan, metoprolol  -Will add additional scheduled hypertensives if BP is not improving  -As needed labetalol     ## Hypothyroidism  -Recent free T4 normal on 9/19 but TSH 24   -Recheck TSH improved to 16 but still elevated, normal T4 and T3  -Restarted PTA Synthroid, patient's PTA Synthroid dose with increased 1 month ago     ## CKD 3A  -Creatinine on presentation 1.45, creatinine  slightly below baseline  -Avoid nephrotoxic medications and renally dose all medications  -Discontinue IV fluid once she has adequate p.o. intake    ## Cognitive impairment  ## Memory deficit  -Patient scored 14/30 on MoCA in April 2024.  History of significant cognitive impairment in the past per notes.  -Discussed with patient's daughter who is concerned patient has significant memory issue and sometimes she confuses her daughter and son, confuses the time of the day  -Concern patient's cognitive impairment may be worsening  -Currently AO x 3  -She is currently living in assisted living facility  -Patient's mental status improving, recheck MoCA ordered     Stable chronic medical problems  · Other chronic medical problems are stable at the present. Will continue chronic management except as otherwise highlighted above. Further management will depend on the hospital course.    DVT prophylaxis: Lovenox SQ  POA/ Next of Kin:  Code Status: DNR    DISPOSITION: Discharge pending adequate p.o. intake, MoCA test, placement assessment    Above assessment and plan discussed with the patient and/or patient's family at bedside. All of their questions were answered.    Gideon Davis MD  Hospitalist  Secure chat preferred    From 7PM to 7AM, please call the Hospitalist-On-Call      01-Jan-2024 00:14

## 2024-11-07 NOTE — ASU PATIENT PROFILE, ADULT - NSICDXPASTSURGICALHX_GEN_ALL_CORE_FT
PAST SURGICAL HISTORY:  H/O arthroscopy of left knee     H/O foot surgery left X3    H/O rhinoplasty     H/O total knee replacement left    S/P bunionectomy      PAST SURGICAL HISTORY:  H/O arthroscopy of left knee     H/O foot surgery left X3    H/O fracture of femur right    H/O rhinoplasty     H/O total knee replacement left    S/P bunionectomy

## 2024-11-07 NOTE — ASU PATIENT PROFILE, ADULT - FALL HARM RISK - FALL HARM RISK
falls HX  year ago at home , Fractured left femur , uses a walker for assistance/Age/Other Bone Condition/Other

## 2024-11-07 NOTE — ASU PATIENT PROFILE, ADULT - FALL HARM RISK - HARM RISK INTERVENTIONS
Communicate Risk of Fall with Harm to all staff/Reinforce activity limits and safety measures with patient and family/Tailored Fall Risk Interventions/Visual Cue: Yellow wristband and red socks/Bed in lowest position, wheels locked, appropriate side rails in place/Call bell, personal items and telephone in reach/Instruct patient to call for assistance before getting out of bed or chair/Non-slip footwear when patient is out of bed/[place] to call system/Physically safe environment - no spills, clutter or unnecessary equipment/Purposeful Proactive Rounding/Room/bathroom lighting operational, light cord in reach Assistance with ambulation/Assistance OOB with selected safe patient handling equipment/Communicate Risk of Fall with Harm to all staff/Discuss with provider need for PT consult/Monitor gait and stability/Provide patient with walking aids - walker, cane, crutches/Reinforce activity limits and safety measures with patient and family/Tailored Fall Risk Interventions/Visual Cue: Yellow wristband and red socks/Bed in lowest position, wheels locked, appropriate side rails in place/Call bell, personal items and telephone in reach/Instruct patient to call for assistance before getting out of bed or chair/Non-slip footwear when patient is out of bed/[place] to call system/Physically safe environment - no spills, clutter or unnecessary equipment/Purposeful Proactive Rounding/Room/bathroom lighting operational, light cord in reach

## 2024-11-07 NOTE — ASU PATIENT PROFILE, ADULT - BLOOD AVOIDANCE/RESTRICTIONS, PROFILE
Preop instructions:      NPO instructions: NO solids foods,non-clear liquids including milk, milk products, creamers, gum, mints, or hard candy after midnight the night prior to your surgery or 8 hours prior to your SX start time.   We encourage a limited consumption of CLEAR LIQUIDS after midnight the night before your surgery up to 2 hrs prior to your SX start time.      Acceptable Clear Liquids are listed below:     Water, Gatorade/Powerade sports drinks, Apple juice (no pulp) Black coffee with without sugar/NO milk, cream or creamer or Jello.      If you have received specific instructions from your Surgeon/Surgeon's staff, please follow their instructions.      Showering instructions, directions, leave all valuables at home, medication instructions for PM prior & am of procedure explained. Patient stated an understanding.      Patient denies any side effects or issues with anesthesia or sedation.     0615 ARRIVAL TIME TO [facility]    none

## 2024-11-07 NOTE — ASU PATIENT PROFILE, ADULT - NSICDXPASTMEDICALHX_GEN_ALL_CORE_FT
PAST MEDICAL HISTORY:  Dyslipidemia     Enlarged aorta     Heart murmur      PAST MEDICAL HISTORY:  CAD (coronary artery disease)     Dyslipidemia     Enlarged aorta     Heart murmur

## 2024-11-07 NOTE — ASU PATIENT PROFILE, ADULT - NS PREOP UNDERSTANDS INFO
Spoke to patient to be NPO/NO solid foods after  12MN, allow to drink water  or  apple juice till 2 am,  dress comfortable, no lotion , no jewelry,  Bring ID photo and  insurance cards,   escort arranged , address and telephone given/yes

## 2024-11-08 ENCOUNTER — OUTPATIENT (OUTPATIENT)
Dept: OUTPATIENT SERVICES | Facility: HOSPITAL | Age: 79
LOS: 1 days | Discharge: HOME | End: 2024-11-08

## 2024-11-08 VITALS
TEMPERATURE: 97 F | WEIGHT: 150.13 LBS | RESPIRATION RATE: 17 BRPM | SYSTOLIC BLOOD PRESSURE: 147 MMHG | HEART RATE: 82 BPM | DIASTOLIC BLOOD PRESSURE: 90 MMHG | HEIGHT: 62 IN | OXYGEN SATURATION: 97 %

## 2024-11-08 VITALS
HEART RATE: 74 BPM | TEMPERATURE: 98 F | SYSTOLIC BLOOD PRESSURE: 145 MMHG | OXYGEN SATURATION: 98 % | RESPIRATION RATE: 16 BRPM | DIASTOLIC BLOOD PRESSURE: 72 MMHG

## 2024-11-08 DIAGNOSIS — Z96.659 PRESENCE OF UNSPECIFIED ARTIFICIAL KNEE JOINT: Chronic | ICD-10-CM

## 2024-11-08 DIAGNOSIS — Z98.890 OTHER SPECIFIED POSTPROCEDURAL STATES: Chronic | ICD-10-CM

## 2024-11-08 DIAGNOSIS — Z41.1 ENCOUNTER FOR COSMETIC SURGERY: Chronic | ICD-10-CM

## 2024-11-08 DIAGNOSIS — Z98.89 OTHER SPECIFIED POSTPROCEDURAL STATES: Chronic | ICD-10-CM

## 2024-11-08 DIAGNOSIS — Z87.81 PERSONAL HISTORY OF (HEALED) TRAUMATIC FRACTURE: Chronic | ICD-10-CM

## 2024-11-08 DEVICE — K-WIRE BRASSELER (EXTRA SHARP) DOUBLE DIAMOND 1.6MM X 4"
Type: IMPLANTABLE DEVICE | Site: LEFT | Status: NON-FUNCTIONAL
Removed: 2024-11-08

## 2024-11-08 RX ORDER — FENTANYL CITRAT/DEXTROSE 5%/PF 1250MCG/50
25 PATIENT CONTROLLED ANALGESIA SYRINGE INTRAVENOUS ONCE
Refills: 0 | Status: DISCONTINUED | OUTPATIENT
Start: 2024-11-08 | End: 2024-11-08

## 2024-11-08 RX ORDER — ONDANSETRON HYDROCHLORIDE 2 MG/ML
4 INJECTION, SOLUTION INTRAMUSCULAR; INTRAVENOUS ONCE
Refills: 0 | Status: DISCONTINUED | OUTPATIENT
Start: 2024-11-08 | End: 2024-11-08

## 2024-11-08 RX ORDER — OXYCODONE HYDROCHLORIDE 30 MG/1
5 TABLET ORAL ONCE
Refills: 0 | Status: DISCONTINUED | OUTPATIENT
Start: 2024-11-08 | End: 2024-11-08

## 2024-11-08 RX ORDER — ACETAMINOPHEN 500 MG
650 TABLET ORAL ONCE
Refills: 0 | Status: DISCONTINUED | OUTPATIENT
Start: 2024-11-08 | End: 2024-11-08

## 2024-11-08 RX ORDER — CHLORHEXIDINE GLUCONATE 40 MG/ML
1 SOLUTION TOPICAL ONCE
Refills: 0 | Status: COMPLETED | OUTPATIENT
Start: 2024-11-08 | End: 2024-11-08

## 2024-11-08 RX ADMIN — CHLORHEXIDINE GLUCONATE 1 APPLICATION(S): 40 SOLUTION TOPICAL at 10:15

## 2024-11-08 NOTE — BRIEF OPERATIVE NOTE - NSICDXBRIEFPROCEDURE_GEN_ALL_CORE_FT
PROCEDURES:  Removal of hardware from metatarsal bone of left foot 08-Nov-2024 13:40:43  Renny Gonzalez

## 2024-11-08 NOTE — PRE-ANESTHESIA EVALUATION ADULT - BSA (M2)
Detail Level: Generalized
Quality 226: Preventive Care And Screening: Tobacco Use: Screening And Cessation Intervention: Patient screened for tobacco use and is an ex/non-smoker
1.69

## 2024-11-08 NOTE — ASU DISCHARGE PLAN (ADULT/PEDIATRIC) - FINANCIAL ASSISTANCE
[facility] provides services at a reduced cost to those who are determined to be eligible through [facility]’s financial assistance program. Information regarding [facility]’s financial assistance program can be found by going to [email or web address] or by calling [phone].

## 2024-11-08 NOTE — PRE-ANESTHESIA EVALUATION ADULT - NSANTHTIREDRD_ENT_A_CORE
[Cancer Type / Location / Histology Subtype: ________] : Cancer Type / Location / Histology Subtype: [unfilled] [Diagnosis Date (year): ____] : Diagnosis Date (year): [unfilled] [Surgery] : Surgery: Yes [Surgery Date(s) (year): ____] : Surgery Date(s) (year): [unfilled] [Surgical Procedure / Location / Findings: _________] : Surgical Procedure / Location / Findings: [unfilled] [Radiation] : Radiation: No [Follow up with Oncologist in _____] : Follow up with Oncologist in [unfilled] [Follow up with Surgeon in _____] : Follow up with Surgeon in [unfilled] [Primary care physician] : primary care physician [Colonoscopy] : colonoscopy [Mammogram] : Mammogram [Skin Checks] : skin checks [Bone Density Test] : bone density test [Anxiety and Depression] : anxiety and depression [Emotional and mental health] : Emotional and mental health [Fatigue] : Fatigue [Diet] : Diet [Sun screen use] : Sun screen use [Path to Wellness Survivorship Program] : Path to Wellness Survivorship Program [Bridge to Survivorship Breast Cancer] : Bridge to Survivorship Breast Cancer [FreeTextEntry2] : Herceptin 1st dose 7/8/2021\par Irmale [FreeTextEntry8] : Mindy Huang RN No

## 2024-11-08 NOTE — PRE-ANESTHESIA EVALUATION ADULT - NSANTHADDINFOFT_GEN_ALL_CORE
Pt aware and agrees to possible prolonged weakness.\, numbness left foot; local anesthetic toxicity; nerve , vascular damage

## 2024-11-08 NOTE — ASU DISCHARGE PLAN (ADULT/PEDIATRIC) - CARE PROVIDER_API CALL
Barak Boswell Shun  Orthopaedic Surgery  [address]  [place] [address]  Phone: [phone]  Fax: [phone]  Follow Up Time: 1 week

## 2025-03-11 PROBLEM — I25.10 ATHEROSCLEROTIC HEART DISEASE OF NATIVE CORONARY ARTERY WITHOUT ANGINA PECTORIS: Chronic | Status: ACTIVE | Noted: 2024-11-08

## 2025-05-19 ENCOUNTER — OUTPATIENT (OUTPATIENT)
Dept: OUTPATIENT SERVICES | Facility: HOSPITAL | Age: 80
LOS: 1 days | End: 2025-05-19
Payer: MEDICARE

## 2025-05-19 DIAGNOSIS — Z87.81 PERSONAL HISTORY OF (HEALED) TRAUMATIC FRACTURE: Chronic | ICD-10-CM

## 2025-05-19 DIAGNOSIS — Z98.89 OTHER SPECIFIED POSTPROCEDURAL STATES: Chronic | ICD-10-CM

## 2025-05-19 DIAGNOSIS — Z41.1 ENCOUNTER FOR COSMETIC SURGERY: Chronic | ICD-10-CM

## 2025-05-19 DIAGNOSIS — Z98.890 OTHER SPECIFIED POSTPROCEDURAL STATES: Chronic | ICD-10-CM

## 2025-05-19 DIAGNOSIS — Z96.659 PRESENCE OF UNSPECIFIED ARTIFICIAL KNEE JOINT: Chronic | ICD-10-CM

## 2025-05-19 PROCEDURE — 73502 X-RAY EXAM HIP UNI 2-3 VIEWS: CPT | Mod: 26,LT

## 2025-05-19 PROCEDURE — 72100 X-RAY EXAM L-S SPINE 2/3 VWS: CPT

## 2025-05-19 PROCEDURE — 72100 X-RAY EXAM L-S SPINE 2/3 VWS: CPT | Mod: 26

## 2025-05-19 PROCEDURE — 73502 X-RAY EXAM HIP UNI 2-3 VIEWS: CPT

## 2025-05-29 ENCOUNTER — APPOINTMENT (OUTPATIENT)
Dept: ENDOCRINOLOGY | Facility: CLINIC | Age: 80
End: 2025-05-29

## 2025-07-23 ENCOUNTER — APPOINTMENT (OUTPATIENT)
Dept: MAMMOGRAPHY | Facility: CLINIC | Age: 80
End: 2025-07-23
Payer: MEDICARE

## 2025-07-23 ENCOUNTER — APPOINTMENT (OUTPATIENT)
Dept: RADIOLOGY | Facility: CLINIC | Age: 80
End: 2025-07-23

## 2025-07-23 PROCEDURE — 77080 DXA BONE DENSITY AXIAL: CPT

## 2025-07-23 PROCEDURE — 77063 BREAST TOMOSYNTHESIS BI: CPT

## 2025-07-23 PROCEDURE — 77067 SCR MAMMO BI INCL CAD: CPT

## (undated) DEVICE — DRAPE C ARM MINI PACK FOR 6800

## (undated) DEVICE — TOURNIQUET CUFF 34" DUAL PORT W PLC

## (undated) DEVICE — SAW BLADE LINVATEC SAGITTAL MIC 9.5X25.5X0.4MM

## (undated) DEVICE — PREP CHLORAPREP HI-LITE ORANGE 26ML

## (undated) DEVICE — DRILL BIT STRYKER ORTHO 4.2X80MM

## (undated) DEVICE — WARMING BLANKET UPPER ADULT

## (undated) DEVICE — MARKING PEN W RULER

## (undated) DEVICE — SUT VICRYL 2-0 27" CT-2 UNDYED

## (undated) DEVICE — VENODYNE/SCD SLEEVE CALF MEDIUM

## (undated) DEVICE — GLV 7 PROTEXIS (WHITE)

## (undated) DEVICE — PACK ORTHO FOOT ANKLE

## (undated) DEVICE — DRILL BIT STRYKER ORTHO LOKG 4.2X360MM

## (undated) DEVICE — GLV 8 PROTEXIS (WHITE)

## (undated) DEVICE — PACK BASIC GOWN MAYO COVER

## (undated) DEVICE — SUT MONOCRYL PLUS 4-0 18" PS-2 UNDYED

## (undated) DEVICE — MEE-ESU VALLEYLAB T2J57286DX: Type: DURABLE MEDICAL EQUIPMENT

## (undated) DEVICE — TOURNIQUET ESMARK 6"

## (undated) DEVICE — REAMER STRYKER ORTHO SHAFT MOD TRINKLE

## (undated) DEVICE — BUR LINVATEC OVAL MEDIUM 4MM CARBIDE

## (undated) DEVICE — DRSG WEBRIL 6"

## (undated) DEVICE — DRSG ACE BANDAGE 6"

## (undated) DEVICE — DRSG WEBRIL 4"

## (undated) DEVICE — SUT MONOCRYL 4-0 18" PS-2